# Patient Record
Sex: FEMALE | Race: WHITE | NOT HISPANIC OR LATINO | Employment: OTHER | URBAN - METROPOLITAN AREA
[De-identification: names, ages, dates, MRNs, and addresses within clinical notes are randomized per-mention and may not be internally consistent; named-entity substitution may affect disease eponyms.]

---

## 2017-01-09 ENCOUNTER — OFFICE VISIT (OUTPATIENT)
Dept: FAMILY MEDICINE | Facility: CLINIC | Age: 62
End: 2017-01-09
Payer: COMMERCIAL

## 2017-01-09 VITALS
OXYGEN SATURATION: 98 % | SYSTOLIC BLOOD PRESSURE: 112 MMHG | BODY MASS INDEX: 27.85 KG/M2 | HEIGHT: 64 IN | DIASTOLIC BLOOD PRESSURE: 74 MMHG | HEART RATE: 94 BPM | WEIGHT: 163.13 LBS

## 2017-01-09 DIAGNOSIS — I65.23 CAROTID ARTERY PLAQUE, BILATERAL: ICD-10-CM

## 2017-01-09 DIAGNOSIS — Z23 NEED FOR INFLUENZA VACCINATION: ICD-10-CM

## 2017-01-09 DIAGNOSIS — Z23 NEED FOR SHINGLES VACCINE: ICD-10-CM

## 2017-01-09 DIAGNOSIS — N28.9 RENAL LESION: ICD-10-CM

## 2017-01-09 DIAGNOSIS — Z00.00 ANNUAL PHYSICAL EXAM: Primary | ICD-10-CM

## 2017-01-09 DIAGNOSIS — R07.89 ATYPICAL CHEST PAIN: ICD-10-CM

## 2017-01-09 DIAGNOSIS — E78.5 DYSLIPIDEMIA: ICD-10-CM

## 2017-01-09 DIAGNOSIS — D64.9 ANEMIA, UNSPECIFIED TYPE: ICD-10-CM

## 2017-01-09 DIAGNOSIS — I51.7 LEFT ATRIAL ENLARGEMENT: ICD-10-CM

## 2017-01-09 DIAGNOSIS — F33.0 MILD EPISODE OF RECURRENT MAJOR DEPRESSIVE DISORDER: ICD-10-CM

## 2017-01-09 PROCEDURE — 99214 OFFICE O/P EST MOD 30 MIN: CPT | Mod: 25,S$GLB,, | Performed by: FAMILY MEDICINE

## 2017-01-09 PROCEDURE — 1159F MED LIST DOCD IN RCRD: CPT | Mod: S$GLB,,, | Performed by: FAMILY MEDICINE

## 2017-01-09 PROCEDURE — 99999 PR PBB SHADOW E&M-EST. PATIENT-LVL III: CPT | Mod: PBBFAC,,, | Performed by: FAMILY MEDICINE

## 2017-01-09 PROCEDURE — 90688 IIV4 VACCINE SPLT 0.5 ML IM: CPT | Mod: S$GLB,,, | Performed by: FAMILY MEDICINE

## 2017-01-09 PROCEDURE — 90471 IMMUNIZATION ADMIN: CPT | Mod: S$GLB,,, | Performed by: FAMILY MEDICINE

## 2017-01-09 RX ORDER — CYCLOBENZAPRINE HCL 10 MG
TABLET ORAL
Refills: 0 | COMMUNITY
Start: 2016-12-08 | End: 2018-01-23

## 2017-01-09 RX ORDER — HYDROCODONE BITARTRATE AND ACETAMINOPHEN 10; 325 MG/1; MG/1
TABLET ORAL
Refills: 0 | COMMUNITY
Start: 2016-12-10 | End: 2018-01-23

## 2017-01-09 RX ORDER — PRAVASTATIN SODIUM 10 MG/1
10 TABLET ORAL DAILY
Qty: 90 TABLET | Refills: 3 | Status: SHIPPED | OUTPATIENT
Start: 2017-01-09 | End: 2017-03-15 | Stop reason: SDUPTHER

## 2017-01-09 RX ORDER — FERROUS SULFATE 325(65) MG
325 TABLET ORAL
Qty: 30 TABLET | Refills: 2 | COMMUNITY
Start: 2017-01-09 | End: 2018-01-23

## 2017-01-09 NOTE — MR AVS SNAPSHOT
[facility]   [facility] [place] [address]  Phone: [phone]  Fax: [phone]                  Leticia Brown   2017 2:00 PM   Office Visit    Description:  Female : 1955   Provider:  Timo Pike MD   Department:  [facility]           Reason for Visit     Annual Exam     Sinus Problem           Diagnoses this Visit        Comments    Annual physical exam    -  Primary     Mild episode of recurrent major depressive disorder         Atypical chest pain         Left atrial enlargement         Need for shingles vaccine         Need for influenza vaccination         Anemia, unspecified type         Dyslipidemia         Carotid artery plaque, bilateral         Renal lesion                To Do List           Future Appointments        Provider Department Dept Phone    2017 12:45 PM CARDIOLOGY, STRESS/TILT TABLE/[facility] [phone]    2017 2:45 PM [facility] US1 [facility] [phone]      Goals (5 Years of Data)     None      Follow-Up and Disposition     Return in about 4 months (around 2017), or if symptoms worsen or fail to improve.       These Medications        Disp Refills Start End    zoster vaccine live, PF, (ZOSTAVAX, PF,) 19,400 unit/0.65 mL injection 1 vial 0 2017    Inject 19,400 Units into the skin once. - Subcutaneous    [facility] [address] [place] [address] [address] AT [facility] Ph #: [phone]       pravastatin (PRAVACHOL) 10 MG tablet 90 tablet 3 2017    Take 1 tablet (10 mg total) by mouth once daily. - Oral    Pharmacy: [facility] [address] [place] [address] [address] AT [facility] Ph #: [phone]         PURCHASE these Medications (No prescription required)        Start End    ferrous sulfate 325 mg (65 mg iron) Tab tablet 2017     Sig - Route: Take 1  tablet (325 mg total) by mouth daily with breakfast. - Oral    Class: OTC      [facility] On Call     [facility] On Call Nurse Care Line - 24/7 Assistance  Registered nurses in the [facility] provide clinical advisement, health education, appointment booking, and other advisory services.  Call for this free service at [phone].             Medications           Message regarding Medications     Verify the changes and/or additions to your medication regime listed below are the same as discussed with your clinician today.  If any of these changes or additions are incorrect, please notify your healthcare provider.        START taking these NEW medications        Refills    zoster vaccine live, PF, (ZOSTAVAX, PF,) 19,400 unit/0.65 mL injection 0    Sig: Inject 19,400 Units into the skin once.    Class: Print    Route: Subcutaneous    ferrous sulfate 325 mg (65 mg iron) Tab tablet 2    Sig: Take 1 tablet (325 mg total) by mouth daily with breakfast.    Class: OTC    Route: Oral    pravastatin (PRAVACHOL) 10 MG tablet 3    Sig: Take 1 tablet (10 mg total) by mouth once daily.    Class: Normal    Route: Oral           Verify that the below list of medications is an accurate representation of the medications you are currently taking.  If none reported, the list may be blank. If incorrect, please contact your healthcare provider. Carry this list with you in case of emergency.           Current Medications     acyclovir (ZOVIRAX) 200 MG capsule Take 1 capsule (200 mg total) by mouth once daily.    atorvastatin (LIPITOR) 20 MG tablet Take 1 tablet (20 mg total) by mouth every evening.    carisoprodol (SOMA) 350 MG tablet Take 350 mg by mouth 4 (four) times daily as needed for Muscle spasms.    cyclobenzaprine (FLEXERIL) 10 MG tablet TK 1 T PO TID PRN    estrogen, conjugated,-medroxyprogesterone 0.3-1.5 mg (PREMPRO) 0.3-1.5 mg per tablet Take 1 tablet by mouth once daily.    hydrocodone-acetaminophen 10-325mg  "(NORCO)  mg Tab TK 1 T PO  Q 4-6 H PRN P    venlafaxine (EFFEXOR-XR) 150 MG Cp24 Take 1 capsule (150 mg total) by mouth once daily.    ferrous sulfate 325 mg (65 mg iron) Tab tablet Take 1 tablet (325 mg total) by mouth daily with breakfast.    pravastatin (PRAVACHOL) 10 MG tablet Take 1 tablet (10 mg total) by mouth once daily.    zoster vaccine live, PF, (ZOSTAVAX, PF,) 19,400 unit/0.65 mL injection Inject 19,400 Units into the skin once.           Clinical Reference Information           Vital Signs - Last Recorded  Most recent update: 1/9/2017  2:04 PM by Neema Meléndez MA    BP Pulse Ht Wt SpO2 BMI    112/74 (BP Location: Left arm, Patient Position: Sitting, BP Method: Manual) 94 5' 4" (1.626 m) 74 kg (163 lb 2.3 oz) 98% 28 kg/m2      Blood Pressure          Most Recent Value    BP  112/74      Allergies as of 1/9/2017     Sulfa (Sulfonamide Antibiotics)      Immunizations Administered on Date of Encounter - 1/9/2017     Name Date Dose VIS Date Route    influenza - Quadrivalent  Incomplete 0.5 mL 8/7/2015 Intramuscular      Orders Placed During Today's Visit      Normal Orders This Visit    Influenza - Quadrivalent (3 years & older)     Future Labs/Procedures Expected by Expires    CBC auto differential  1/9/2017 1/9/2018    Comprehensive metabolic panel  1/9/2017 4/9/2017    Lipid panel  1/9/2017 4/9/2017    US Kidney Only  1/9/2017 4/9/2017    Exercise stress echo with color flow  As directed 1/9/2018      "

## 2017-01-09 NOTE — PROGRESS NOTES
Subjective:       Patient ID: Leticia Brown is a 61 y.o. female.    Chief Complaint: Annual Exam and Sinus Problem    HPI Comments: 61 years old female who came to the clinic for her physical examination.  Patient recently evaluated at the emergency room secondary to chest pain.  Cardiovascular workup was normal.  EKG with evidence of left atrial enlargement.  No palpitations orthopnea or PND.  Patient with mild anemia.  Patient also with bilateral carotid plaque associated with elevated cholesterol.  Patient stable with current medicine for depression.    Sinus Problem       Review of Systems   Constitutional: Negative.    HENT: Negative.    Eyes: Negative.    Respiratory: Negative.    Cardiovascular: Positive for chest pain. Negative for palpitations and leg swelling.   Gastrointestinal: Negative.    Genitourinary: Negative.    Musculoskeletal: Negative.    Skin: Negative.    Neurological: Negative.    Psychiatric/Behavioral: Negative.        Objective:      Physical Exam   Constitutional: She is oriented to person, place, and time. She appears well-developed and well-nourished. No distress.   HENT:   Head: Normocephalic and atraumatic.   Right Ear: External ear normal.   Left Ear: External ear normal.   Nose: Nose normal.   Mouth/Throat: Oropharynx is clear and moist. No oropharyngeal exudate.   Eyes: Conjunctivae and EOM are normal. Pupils are equal, round, and reactive to light. Right eye exhibits no discharge. Left eye exhibits no discharge. No scleral icterus.   Neck: Normal range of motion. Neck supple. No JVD present. No tracheal deviation present. No thyromegaly present.   Cardiovascular: Normal rate, regular rhythm, normal heart sounds and intact distal pulses.  Exam reveals no gallop and no friction rub.    No murmur heard.  Pulmonary/Chest: Effort normal and breath sounds normal. No stridor. No respiratory distress. She has no wheezes. She has no rales. She exhibits no tenderness.   Abdominal: Soft.  Bowel sounds are normal. She exhibits no distension and no mass. There is no tenderness. There is no rebound and no guarding.   Musculoskeletal: Normal range of motion. She exhibits no edema or tenderness.   Lymphadenopathy:     She has no cervical adenopathy.   Neurological: She is alert and oriented to person, place, and time. She has normal reflexes. No cranial nerve deficit. She exhibits normal muscle tone. Coordination normal.   Skin: Skin is warm and dry. No rash noted. She is not diaphoretic. No erythema. No pallor.   Psychiatric: She has a normal mood and affect. Her behavior is normal. Judgment and thought content normal.       Assessment:       1. Annual physical exam    2. Mild episode of recurrent major depressive disorder    3. Atypical chest pain    4. Left atrial enlargement    5. Need for shingles vaccine    6. Need for influenza vaccination    7. Anemia, unspecified type    8. Dyslipidemia    9. Carotid artery plaque, bilateral    10. Renal lesion        Plan:         Leticia was seen today for annual exam and sinus problem.    Diagnoses and all orders for this visit:    Annual physical exam  -     Comprehensive metabolic panel; Future  -     Lipid panel; Future  -     CBC auto differential; Future    Mild episode of recurrent major depressive disorder    Atypical chest pain  -     Exercise stress echo with color flow; Future    Left atrial enlargement  -     Exercise stress echo with color flow; Future    Need for shingles vaccine  -     zoster vaccine live, PF, (ZOSTAVAX, PF,) 19,400 unit/0.65 mL injection; Inject 19,400 Units into the skin once.    Need for influenza vaccination  -     Influenza - Quadrivalent (3 years & older)    Anemia, unspecified type  -     ferrous sulfate 325 mg (65 mg iron) Tab tablet; Take 1 tablet (325 mg total) by mouth daily with breakfast.    Dyslipidemia  -     pravastatin (PRAVACHOL) 10 MG tablet; Take 1 tablet (10 mg total) by mouth once daily.    Carotid artery  plaque, bilateral  -     pravastatin (PRAVACHOL) 10 MG tablet; Take 1 tablet (10 mg total) by mouth once daily.    Renal lesion  -     US Kidney Only; Future

## 2017-01-12 ENCOUNTER — HOSPITAL ENCOUNTER (OUTPATIENT)
Dept: CARDIOLOGY | Facility: HOSPITAL | Age: 62
Discharge: HOME | End: 2017-01-12
Attending: FAMILY MEDICINE
Payer: COMMERCIAL

## 2017-01-12 DIAGNOSIS — R07.89 ATYPICAL CHEST PAIN: ICD-10-CM

## 2017-01-12 DIAGNOSIS — I51.7 LEFT ATRIAL ENLARGEMENT: ICD-10-CM

## 2017-01-12 LAB
DIASTOLIC DYSFUNCTION: NO
ESTIMATED PA SYSTOLIC PRESSURE: 13.76
MITRAL VALVE MOBILITY: NORMAL
RETIRED EF AND QEF - SEE NOTES: 60 (ref 55–65)
TRICUSPID VALVE REGURGITATION: NORMAL

## 2017-01-12 PROCEDURE — 93325 DOPPLER ECHO COLOR FLOW MAPG: CPT

## 2017-01-12 PROCEDURE — 93320 DOPPLER ECHO COMPLETE: CPT | Mod: 26,,, | Performed by: INTERNAL MEDICINE

## 2017-01-12 PROCEDURE — 93351 STRESS TTE COMPLETE: CPT | Mod: 26,,, | Performed by: INTERNAL MEDICINE

## 2017-01-12 PROCEDURE — 93325 DOPPLER ECHO COLOR FLOW MAPG: CPT | Mod: 26,,, | Performed by: INTERNAL MEDICINE

## 2017-01-31 ENCOUNTER — HOSPITAL ENCOUNTER (OUTPATIENT)
Dept: RADIOLOGY | Facility: HOSPITAL | Age: 62
Discharge: HOME | End: 2017-01-31
Attending: FAMILY MEDICINE
Payer: COMMERCIAL

## 2017-01-31 DIAGNOSIS — N28.9 RENAL LESION: ICD-10-CM

## 2017-01-31 PROCEDURE — 76770 US EXAM ABDO BACK WALL COMP: CPT | Mod: 26,,, | Performed by: RADIOLOGY

## 2017-01-31 PROCEDURE — 76770 US EXAM ABDO BACK WALL COMP: CPT | Mod: TC

## 2017-03-15 DIAGNOSIS — I65.23 CAROTID ARTERY PLAQUE, BILATERAL: ICD-10-CM

## 2017-03-15 DIAGNOSIS — F33.1 MODERATE EPISODE OF RECURRENT MAJOR DEPRESSIVE DISORDER: ICD-10-CM

## 2017-03-15 DIAGNOSIS — F41.9 ANXIETY: ICD-10-CM

## 2017-03-15 DIAGNOSIS — E78.5 DYSLIPIDEMIA: ICD-10-CM

## 2017-03-16 RX ORDER — VENLAFAXINE HYDROCHLORIDE 150 MG/1
150 CAPSULE, EXTENDED RELEASE ORAL DAILY
Qty: 90 CAPSULE | Refills: 3 | Status: SHIPPED | OUTPATIENT
Start: 2017-03-16 | End: 2018-02-28 | Stop reason: SDUPTHER

## 2017-03-16 RX ORDER — PRAVASTATIN SODIUM 10 MG/1
10 TABLET ORAL DAILY
Qty: 90 TABLET | Refills: 3 | Status: SHIPPED | OUTPATIENT
Start: 2017-03-16 | End: 2019-05-29

## 2017-12-06 ENCOUNTER — PATIENT MESSAGE (OUTPATIENT)
Dept: FAMILY MEDICINE | Facility: CLINIC | Age: 62
End: 2017-12-06

## 2018-01-23 ENCOUNTER — OFFICE VISIT (OUTPATIENT)
Dept: FAMILY MEDICINE | Facility: CLINIC | Age: 63
End: 2018-01-23
Payer: COMMERCIAL

## 2018-01-23 VITALS
BODY MASS INDEX: 27.48 KG/M2 | HEIGHT: 64 IN | SYSTOLIC BLOOD PRESSURE: 110 MMHG | DIASTOLIC BLOOD PRESSURE: 60 MMHG | HEART RATE: 86 BPM | WEIGHT: 160.94 LBS

## 2018-01-23 DIAGNOSIS — E78.5 DYSLIPIDEMIA: ICD-10-CM

## 2018-01-23 DIAGNOSIS — F33.0 MILD EPISODE OF RECURRENT MAJOR DEPRESSIVE DISORDER: ICD-10-CM

## 2018-01-23 DIAGNOSIS — G95.0 SYRINGOMYELIA: ICD-10-CM

## 2018-01-23 DIAGNOSIS — Z12.4 PAP SMEAR FOR CERVICAL CANCER SCREENING: ICD-10-CM

## 2018-01-23 DIAGNOSIS — Z98.890 HISTORY OF BACK SURGERY: ICD-10-CM

## 2018-01-23 DIAGNOSIS — Z00.00 ANNUAL PHYSICAL EXAM: Primary | ICD-10-CM

## 2018-01-23 DIAGNOSIS — Z12.31 ENCOUNTER FOR SCREENING MAMMOGRAM FOR MALIGNANT NEOPLASM OF BREAST: ICD-10-CM

## 2018-01-23 DIAGNOSIS — Z98.890 HISTORY OF CERVICAL SPINAL SURGERY: ICD-10-CM

## 2018-01-23 PROCEDURE — 99999 PR PBB SHADOW E&M-EST. PATIENT-LVL V: CPT | Mod: PBBFAC,,, | Performed by: FAMILY MEDICINE

## 2018-01-23 PROCEDURE — 88175 CYTOPATH C/V AUTO FLUID REDO: CPT

## 2018-01-23 PROCEDURE — 99396 PREV VISIT EST AGE 40-64: CPT | Mod: S$GLB,,, | Performed by: FAMILY MEDICINE

## 2018-01-23 NOTE — PATIENT INSTRUCTIONS
Aerobic Exercise for a Healthy Heart  Exercise is a lot more than an energy booster and a stress reliever. It also strengthens your heart muscle, lowers your blood pressure and cholesterol, and burns calories. It can also improve your resting muscle tone, and your mood.     Remember, some activity is better than none.    Choose an aerobic activity  Choose an activity that makes your heart and lungs work harder than they do when you rest or walk normally. This aerobic exercise can improve the way your heart and other muscles use oxygen. Make it fun by exercising with a friend and choosing an activity you enjoy. Here are some ideas:  · Walking  · Swimming  · Bicycling  · Stair climbing  · Dancing  · Jogging  · Gardening  Exercise regularly  If you havent been exercising regularly,  get your doctors OK first. Then start slowly.  Here are some tips:  · Begin exercising 3 times a week for 5 to 10 minutes at a time.  · When you feel comfortable, add a few minutes each session.  · Slowly build up to exercising 3 to 4 times each week. Each session should last for 40 minutes, on average, and involve moderate- to vigorous-intensity physical activity.  · If you have been given nitroglycerin, be sure to carry it when you exercise.  · If you get chest pain (angina) when youre exercising, stop what youre doing, take your nitroglycerin, and call your doctor.  Date Last Reviewed: 6/2/2016  © 8881-0007 [employer]. [address] [place] [address], [place] [address]. All rights reserved. This information is not intended as a substitute for professional medical care. Always follow your healthcare professional's instructions.

## 2018-01-23 NOTE — PROGRESS NOTES
Subjective:       Patient ID: Leticia Brown is a 62 y.o. female.    Chief Complaint: Annual Exam    62 years old female came to the clinic for her physical examination.  Patient depression currently stable on Effexor.  No recent flareups.  Patient with history of back and neck surgery associated with syringomyelia  requesting follow-up with neurosurgery.  Patient due for her Pap smear.      Review of Systems   Constitutional: Negative.  Negative for activity change and unexpected weight change.   HENT: Negative.  Negative for hearing loss, rhinorrhea and trouble swallowing.    Eyes: Negative.  Negative for discharge and visual disturbance.   Respiratory: Negative.  Negative for chest tightness and wheezing.    Cardiovascular: Negative.  Negative for chest pain and palpitations.   Gastrointestinal: Negative.  Negative for blood in stool, constipation, diarrhea and vomiting.   Endocrine: Negative for polydipsia and polyuria.   Genitourinary: Negative.  Negative for difficulty urinating, dysuria, hematuria and menstrual problem.   Musculoskeletal: Positive for back pain. Negative for arthralgias, joint swelling and neck pain.   Skin: Negative.    Neurological: Negative.  Negative for weakness and headaches.   Psychiatric/Behavioral: Negative.  Negative for confusion and dysphoric mood.       Objective:      Physical Exam   Constitutional: She is oriented to person, place, and time. She appears well-developed and well-nourished. No distress.   HENT:   Head: Normocephalic and atraumatic.   Right Ear: External ear normal.   Left Ear: External ear normal.   Nose: Nose normal.   Mouth/Throat: Oropharynx is clear and moist. No oropharyngeal exudate.   Eyes: Conjunctivae and EOM are normal. Pupils are equal, round, and reactive to light. Right eye exhibits no discharge. Left eye exhibits no discharge. No scleral icterus.   Neck: Normal range of motion. Neck supple. No JVD present. No tracheal deviation present. No  thyromegaly present.   Cardiovascular: Normal rate, regular rhythm, normal heart sounds and intact distal pulses.  Exam reveals no gallop and no friction rub.    No murmur heard.  Pulmonary/Chest: Effort normal and breath sounds normal. No stridor. No respiratory distress. She has no wheezes. She has no rales. She exhibits no tenderness.   Abdominal: Soft. Bowel sounds are normal. She exhibits no distension and no mass. There is no tenderness. There is no rebound and no guarding.   Genitourinary: Cervix exhibits no motion tenderness, no discharge and no friability. No erythema, tenderness or bleeding in the vagina. No foreign body in the vagina. No signs of injury around the vagina. No vaginal discharge found.   Musculoskeletal: Normal range of motion. She exhibits no edema or tenderness.   Lymphadenopathy:     She has no cervical adenopathy.   Neurological: She is alert and oriented to person, place, and time. She has normal reflexes. No cranial nerve deficit. She exhibits normal muscle tone. Coordination normal.   Skin: Skin is warm and dry. No rash noted. She is not diaphoretic. No erythema. No pallor.   Psychiatric: She has a normal mood and affect. Her behavior is normal. Judgment and thought content normal.       Assessment:       1. Annual physical exam    2. Dyslipidemia    3. Mild episode of recurrent major depressive disorder    4. Pap smear for cervical cancer screening    5. Encounter for screening mammogram for malignant neoplasm of breast    6. Syringomyelia    7. History of back surgery    8. History of cervical spinal surgery        Plan:         Leticia was seen today for annual exam.    Diagnoses and all orders for this visit:    Annual physical exam  -     Comprehensive metabolic panel; Future  -     Lipid panel; Future  -     Urinalysis; Future  -     CBC auto differential; Future    Dyslipidemia  -     Comprehensive metabolic panel; Future  -     Lipid panel; Future  -     TSH; Future    Mild  episode of recurrent major depressive disorder  -     Comprehensive metabolic panel; Future  -     TSH; Future  -     CBC auto differential; Future    Pap smear for cervical cancer screening  -     Liquid-based pap smear, screening    Encounter for screening mammogram for malignant neoplasm of breast  -     Mammo Digital Screening Bilat with CAD; Future    Syringomyelia  -     MRI Thoracic Spine Without Contrast; Future  -     Ambulatory referral to Neurosurgery    History of back surgery  -     MRI Lumbar Spine Without Contrast; Future  -     Ambulatory referral to Neurosurgery    History of cervical spinal surgery  -     MRI Cervical Spine Without Contrast; Future  -     Ambulatory referral to Neurosurgery

## 2018-01-24 ENCOUNTER — HOSPITAL ENCOUNTER (OUTPATIENT)
Dept: RADIOLOGY | Facility: HOSPITAL | Age: 63
Discharge: HOME | End: 2018-01-24
Attending: FAMILY MEDICINE
Payer: COMMERCIAL

## 2018-01-24 ENCOUNTER — LAB VISIT (OUTPATIENT)
Dept: LAB | Facility: HOSPITAL | Age: 63
End: 2018-01-24
Attending: FAMILY MEDICINE
Payer: COMMERCIAL

## 2018-01-24 DIAGNOSIS — Z12.31 ENCOUNTER FOR SCREENING MAMMOGRAM FOR MALIGNANT NEOPLASM OF BREAST: ICD-10-CM

## 2018-01-24 DIAGNOSIS — E78.5 DYSLIPIDEMIA: ICD-10-CM

## 2018-01-24 DIAGNOSIS — F33.0 MILD EPISODE OF RECURRENT MAJOR DEPRESSIVE DISORDER: ICD-10-CM

## 2018-01-24 DIAGNOSIS — Z00.00 ANNUAL PHYSICAL EXAM: ICD-10-CM

## 2018-01-24 LAB
ALBUMIN SERPL BCP-MCNC: 3.6 G/DL
ALP SERPL-CCNC: 107 U/L
ALT SERPL W/O P-5'-P-CCNC: 21 U/L
ANION GAP SERPL CALC-SCNC: 6 MMOL/L
AST SERPL-CCNC: 23 U/L
BASOPHILS # BLD AUTO: 0.04 K/UL
BASOPHILS NFR BLD: 0.9 %
BILIRUB SERPL-MCNC: 0.5 MG/DL
BUN SERPL-MCNC: 15 MG/DL
CALCIUM SERPL-MCNC: 9.4 MG/DL
CHLORIDE SERPL-SCNC: 103 MMOL/L
CHOLEST SERPL-MCNC: 240 MG/DL
CHOLEST/HDLC SERPL: 2.6 {RATIO}
CO2 SERPL-SCNC: 33 MMOL/L
CREAT SERPL-MCNC: 0.8 MG/DL
DIFFERENTIAL METHOD: ABNORMAL
EOSINOPHIL # BLD AUTO: 0.1 K/UL
EOSINOPHIL NFR BLD: 1.9 %
ERYTHROCYTE [DISTWIDTH] IN BLOOD BY AUTOMATED COUNT: 13.4 %
EST. GFR  (AFRICAN AMERICAN): >60 ML/MIN/1.73 M^2
EST. GFR  (NON AFRICAN AMERICAN): >60 ML/MIN/1.73 M^2
GLUCOSE SERPL-MCNC: 91 MG/DL
HCT VFR BLD AUTO: 39.2 %
HDLC SERPL-MCNC: 92 MG/DL
HDLC SERPL: 38.3 %
HGB BLD-MCNC: 12.5 G/DL
IMM GRANULOCYTES # BLD AUTO: 0 K/UL
IMM GRANULOCYTES NFR BLD AUTO: 0 %
LDLC SERPL CALC-MCNC: 135.8 MG/DL
LYMPHOCYTES # BLD AUTO: 1.6 K/UL
LYMPHOCYTES NFR BLD: 34.6 %
MCH RBC QN AUTO: 28.5 PG
MCHC RBC AUTO-ENTMCNC: 31.9 G/DL
MCV RBC AUTO: 89 FL
MONOCYTES # BLD AUTO: 0.4 K/UL
MONOCYTES NFR BLD: 9.4 %
NEUTROPHILS # BLD AUTO: 2.5 K/UL
NEUTROPHILS NFR BLD: 53.2 %
NONHDLC SERPL-MCNC: 148 MG/DL
NRBC BLD-RTO: 0 /100 WBC
PLATELET # BLD AUTO: 297 K/UL
PMV BLD AUTO: 11.4 FL
POTASSIUM SERPL-SCNC: 3.8 MMOL/L
PROT SERPL-MCNC: 7.1 G/DL
RBC # BLD AUTO: 4.39 M/UL
SODIUM SERPL-SCNC: 142 MMOL/L
TRIGL SERPL-MCNC: 61 MG/DL
TSH SERPL DL<=0.005 MIU/L-ACNC: 2.81 UIU/ML
WBC # BLD AUTO: 4.68 K/UL

## 2018-01-24 PROCEDURE — 80061 LIPID PANEL: CPT

## 2018-01-24 PROCEDURE — 84443 ASSAY THYROID STIM HORMONE: CPT

## 2018-01-24 PROCEDURE — 85025 COMPLETE CBC W/AUTO DIFF WBC: CPT

## 2018-01-24 PROCEDURE — 80053 COMPREHEN METABOLIC PANEL: CPT

## 2018-01-24 PROCEDURE — 77067 SCR MAMMO BI INCL CAD: CPT | Mod: 26,,, | Performed by: RADIOLOGY

## 2018-01-24 PROCEDURE — 36415 COLL VENOUS BLD VENIPUNCTURE: CPT | Mod: PO

## 2018-01-24 PROCEDURE — 77067 SCR MAMMO BI INCL CAD: CPT | Mod: TC

## 2018-01-24 PROCEDURE — 77063 BREAST TOMOSYNTHESIS BI: CPT | Mod: 26,,, | Performed by: RADIOLOGY

## 2018-02-15 ENCOUNTER — TELEPHONE (OUTPATIENT)
Dept: FAMILY MEDICINE | Facility: CLINIC | Age: 63
End: 2018-02-15

## 2018-02-15 DIAGNOSIS — G95.0 SYRINGOMYELIA: Primary | ICD-10-CM

## 2018-02-28 ENCOUNTER — HOSPITAL ENCOUNTER (OUTPATIENT)
Dept: RADIOLOGY | Facility: HOSPITAL | Age: 63
Discharge: HOME | End: 2018-02-28
Attending: FAMILY MEDICINE
Payer: COMMERCIAL

## 2018-02-28 ENCOUNTER — PATIENT MESSAGE (OUTPATIENT)
Dept: FAMILY MEDICINE | Facility: CLINIC | Age: 63
End: 2018-02-28

## 2018-02-28 DIAGNOSIS — B00.9 HERPES: ICD-10-CM

## 2018-02-28 DIAGNOSIS — F33.1 MODERATE EPISODE OF RECURRENT MAJOR DEPRESSIVE DISORDER: ICD-10-CM

## 2018-02-28 DIAGNOSIS — F41.9 ANXIETY: ICD-10-CM

## 2018-02-28 DIAGNOSIS — G95.0 SYRINGOMYELIA: ICD-10-CM

## 2018-02-28 PROCEDURE — 72131 CT LUMBAR SPINE W/O DYE: CPT | Mod: 26,,, | Performed by: RADIOLOGY

## 2018-02-28 PROCEDURE — 72131 CT LUMBAR SPINE W/O DYE: CPT | Mod: TC

## 2018-02-28 RX ORDER — ACYCLOVIR 200 MG/1
200 CAPSULE ORAL DAILY
Qty: 90 CAPSULE | Refills: 1 | Status: SHIPPED | OUTPATIENT
Start: 2018-02-28 | End: 2019-04-01 | Stop reason: SDUPTHER

## 2018-02-28 RX ORDER — VENLAFAXINE HYDROCHLORIDE 150 MG/1
150 CAPSULE, EXTENDED RELEASE ORAL DAILY
Qty: 90 CAPSULE | Refills: 3 | Status: SHIPPED | OUTPATIENT
Start: 2018-02-28 | End: 2019-04-01 | Stop reason: SDUPTHER

## 2018-03-05 ENCOUNTER — TELEPHONE (OUTPATIENT)
Dept: FAMILY MEDICINE | Facility: CLINIC | Age: 63
End: 2018-03-05

## 2018-03-05 DIAGNOSIS — G95.0 SYRINGOMYELIA: Primary | ICD-10-CM

## 2018-03-06 ENCOUNTER — TELEPHONE (OUTPATIENT)
Dept: FAMILY MEDICINE | Facility: CLINIC | Age: 63
End: 2018-03-06

## 2018-03-06 NOTE — TELEPHONE ENCOUNTER
Dr burgess spoke to physician at [facility], ct of t-spine not covered, they want pt to see neurosurgeon first

## 2018-03-06 NOTE — TELEPHONE ENCOUNTER
The Pt case is pending with [employer].  The insurance ([facility]) states that additional clinical information is needed in order to complete the pt case.The physician or someone in the physicians staff are needed to call [phone] to discuss this case with a clinical nurse reviewer. The case is due to close soon if no one from the physicians staff calls to give the additional clinical information. I sent an in basket to the physician and to the physicians staff.     -Luzma Ramírez

## 2018-03-09 ENCOUNTER — TELEPHONE (OUTPATIENT)
Dept: NEUROSURGERY | Facility: CLINIC | Age: 63
End: 2018-03-09

## 2019-04-01 DIAGNOSIS — F33.1 MODERATE EPISODE OF RECURRENT MAJOR DEPRESSIVE DISORDER: ICD-10-CM

## 2019-04-01 DIAGNOSIS — F41.9 ANXIETY: ICD-10-CM

## 2019-04-01 DIAGNOSIS — B00.9 HERPES: ICD-10-CM

## 2019-04-01 RX ORDER — ACYCLOVIR 200 MG/1
200 CAPSULE ORAL DAILY
Qty: 90 CAPSULE | Refills: 3 | Status: SHIPPED | OUTPATIENT
Start: 2019-04-01 | End: 2021-09-08 | Stop reason: SDUPTHER

## 2019-04-01 RX ORDER — VENLAFAXINE HYDROCHLORIDE 150 MG/1
150 CAPSULE, EXTENDED RELEASE ORAL DAILY
Qty: 90 CAPSULE | Refills: 3 | Status: SHIPPED | OUTPATIENT
Start: 2019-04-01 | End: 2020-03-17 | Stop reason: SDUPTHER

## 2019-04-01 NOTE — TELEPHONE ENCOUNTER
----- Message from Gricelda Mcguire sent at 4/1/2019  9:31 AM CDT -----  Contact: [phone]/self  Type:  RX Refill Request    Who Called: Leticia Brown  Refill or New Rx:refill  RX Name and Strength: venlafaxine (EFFEXOR-XR) 150 MG Cp24  How is the patient currently taking it? (ex. 1XDay):Take 1 capsule (150 mg total) by mouth once daily  Is this a 30 day or 90 day RX:90 day  Preferred Pharmacy with phone number: [facility] [id number] - [place], [place] [address] AT [facility] [address]  Local or Mail Order:Local  Ordering Provider:Dr. Melgar   Would the patient rather a call back or a response via [facility]? callback  Best Call Back Number:[phone]  Additional Information: [place] out [place] pt would like medication by tomorrow.

## 2019-04-01 NOTE — TELEPHONE ENCOUNTER
----- Message from Danny Cortés sent at 4/1/2019 10:52 AM CDT -----  Contact: [phone] or annia [phone]   Patient is requesting orders for lab work. Please call.    Type:  RX Refill Request    Who Called: Pt  Refill or New Rx:Refill  RX Name and Strength:acyclovir (ZOVIRAX) 200 MG capsule  How is the patient currently taking it? (ex. 1XDay):Take 1 capsule (200 mg total) by mouth once daily  Is this a 30 day or 90 day RX:N/A  Preferred Pharmacy with phone number:[facility] [id number] [place], [place] - [address] AT [facility] [address]  Local or Mail Order:Local  Ordering Provider:Dr. Melgar  Would the patient rather a call back or a response via [facility]? Call back  Best Call Back Number: [phone] or annia [phone]   Additional Information: N/A

## 2019-04-01 NOTE — TELEPHONE ENCOUNTER
----- Message from Gricelda Mcguire sent at 4/1/2019  9:31 AM CDT -----  Contact: [phone]/self  Type:  RX Refill Request    Who Called: Leticia Brown  Refill or New Rx:refill  RX Name and Strength: venlafaxine (EFFEXOR-XR) 150 MG Cp24  How is the patient currently taking it? (ex. 1XDay):Take 1 capsule (150 mg total) by mouth once daily  Is this a 30 day or 90 day RX:90 day  Preferred Pharmacy with phone number: [facility] [id number] - [place], [place] [address] [facility] [address]  Local or Mail Order:Local  Ordering Provider:Dr. Melgar   Would the patient rather a call back or a response via [facility]? callback  Best Call Back Number:[phone]  Additional Information: Patient is going out of the country pt would like medication by tomorrow.

## 2019-05-23 ENCOUNTER — TELEPHONE (OUTPATIENT)
Dept: FAMILY MEDICINE | Facility: CLINIC | Age: 64
End: 2019-05-23

## 2019-05-23 DIAGNOSIS — E78.5 DYSLIPIDEMIA: Primary | ICD-10-CM

## 2019-05-23 NOTE — TELEPHONE ENCOUNTER
----- Message from Ella Carrillo sent at 5/23/2019  2:04 PM CDT -----  Contact: [phone]/self   Patient requesting lab orders be put in the system.  Please advise

## 2019-05-28 ENCOUNTER — LAB VISIT (OUTPATIENT)
Dept: LAB | Facility: HOSPITAL | Age: 64
End: 2019-05-28
Attending: FAMILY MEDICINE
Payer: COMMERCIAL

## 2019-05-28 DIAGNOSIS — E78.5 DYSLIPIDEMIA: ICD-10-CM

## 2019-05-28 LAB
ALBUMIN SERPL BCP-MCNC: 3.7 G/DL (ref 3.5–5.2)
ALP SERPL-CCNC: 102 U/L (ref 55–135)
ALT SERPL W/O P-5'-P-CCNC: 18 U/L (ref 10–44)
ANION GAP SERPL CALC-SCNC: 7 MMOL/L (ref 8–16)
AST SERPL-CCNC: 21 U/L (ref 10–40)
BASOPHILS # BLD AUTO: 0.03 K/UL (ref 0–0.2)
BASOPHILS NFR BLD: 0.6 % (ref 0–1.9)
BILIRUB SERPL-MCNC: 0.4 MG/DL (ref 0.1–1)
BUN SERPL-MCNC: 18 MG/DL (ref 8–23)
CALCIUM SERPL-MCNC: 9 MG/DL (ref 8.7–10.5)
CHLORIDE SERPL-SCNC: 106 MMOL/L (ref 95–110)
CHOLEST SERPL-MCNC: 216 MG/DL (ref 120–199)
CHOLEST/HDLC SERPL: 3 {RATIO} (ref 2–5)
CO2 SERPL-SCNC: 30 MMOL/L (ref 23–29)
CREAT SERPL-MCNC: 0.8 MG/DL (ref 0.5–1.4)
DIFFERENTIAL METHOD: NORMAL
EOSINOPHIL # BLD AUTO: 0.1 K/UL (ref 0–0.5)
EOSINOPHIL NFR BLD: 1.9 % (ref 0–8)
ERYTHROCYTE [DISTWIDTH] IN BLOOD BY AUTOMATED COUNT: 13.5 % (ref 11.5–14.5)
EST. GFR  (AFRICAN AMERICAN): >60 ML/MIN/1.73 M^2
EST. GFR  (NON AFRICAN AMERICAN): >60 ML/MIN/1.73 M^2
GLUCOSE SERPL-MCNC: 93 MG/DL (ref 70–110)
HCT VFR BLD AUTO: 40.1 % (ref 37–48.5)
HDLC SERPL-MCNC: 73 MG/DL (ref 40–75)
HDLC SERPL: 33.8 % (ref 20–50)
HGB BLD-MCNC: 12.9 G/DL (ref 12–16)
IMM GRANULOCYTES # BLD AUTO: 0.01 K/UL (ref 0–0.04)
IMM GRANULOCYTES NFR BLD AUTO: 0.2 % (ref 0–0.5)
LDLC SERPL CALC-MCNC: 124.8 MG/DL (ref 63–159)
LYMPHOCYTES # BLD AUTO: 1.4 K/UL (ref 1–4.8)
LYMPHOCYTES NFR BLD: 26 % (ref 18–48)
MCH RBC QN AUTO: 29 PG (ref 27–31)
MCHC RBC AUTO-ENTMCNC: 32.2 G/DL (ref 32–36)
MCV RBC AUTO: 90 FL (ref 82–98)
MONOCYTES # BLD AUTO: 0.6 K/UL (ref 0.3–1)
MONOCYTES NFR BLD: 11.3 % (ref 4–15)
NEUTROPHILS # BLD AUTO: 3.2 K/UL (ref 1.8–7.7)
NEUTROPHILS NFR BLD: 60 % (ref 38–73)
NONHDLC SERPL-MCNC: 143 MG/DL
NRBC BLD-RTO: 0 /100 WBC
PLATELET # BLD AUTO: 290 K/UL (ref 150–350)
PMV BLD AUTO: 11.6 FL (ref 9.2–12.9)
POTASSIUM SERPL-SCNC: 3.7 MMOL/L (ref 3.5–5.1)
PROT SERPL-MCNC: 7.1 G/DL (ref 6–8.4)
RBC # BLD AUTO: 4.45 M/UL (ref 4–5.4)
SODIUM SERPL-SCNC: 143 MMOL/L (ref 136–145)
TRIGL SERPL-MCNC: 91 MG/DL (ref 30–150)
TSH SERPL DL<=0.005 MIU/L-ACNC: 2.12 UIU/ML (ref 0.4–4)
WBC # BLD AUTO: 5.31 K/UL (ref 3.9–12.7)

## 2019-05-28 PROCEDURE — 80053 COMPREHEN METABOLIC PANEL: CPT

## 2019-05-28 PROCEDURE — 85025 COMPLETE CBC W/AUTO DIFF WBC: CPT

## 2019-05-28 PROCEDURE — 36415 COLL VENOUS BLD VENIPUNCTURE: CPT | Mod: PO

## 2019-05-28 PROCEDURE — 84443 ASSAY THYROID STIM HORMONE: CPT

## 2019-05-28 PROCEDURE — 80061 LIPID PANEL: CPT

## 2019-05-29 ENCOUNTER — OFFICE VISIT (OUTPATIENT)
Dept: FAMILY MEDICINE | Facility: CLINIC | Age: 64
End: 2019-05-29
Payer: COMMERCIAL

## 2019-05-29 ENCOUNTER — HOSPITAL ENCOUNTER (OUTPATIENT)
Dept: RADIOLOGY | Facility: HOSPITAL | Age: 64
Discharge: HOME | End: 2019-05-29
Attending: FAMILY MEDICINE
Payer: COMMERCIAL

## 2019-05-29 VITALS
HEIGHT: 64 IN | HEART RATE: 82 BPM | OXYGEN SATURATION: 98 % | DIASTOLIC BLOOD PRESSURE: 60 MMHG | SYSTOLIC BLOOD PRESSURE: 120 MMHG | WEIGHT: 165.38 LBS | BODY MASS INDEX: 28.24 KG/M2

## 2019-05-29 DIAGNOSIS — M25.472 ANKLE SWELLING, LEFT: ICD-10-CM

## 2019-05-29 DIAGNOSIS — M95.0 NASAL DEFORMITY: ICD-10-CM

## 2019-05-29 DIAGNOSIS — Z00.00 ANNUAL PHYSICAL EXAM: Primary | ICD-10-CM

## 2019-05-29 DIAGNOSIS — L98.9 SKIN LESION: ICD-10-CM

## 2019-05-29 DIAGNOSIS — E78.5 DYSLIPIDEMIA: ICD-10-CM

## 2019-05-29 DIAGNOSIS — R13.12 OROPHARYNGEAL DYSPHAGIA: ICD-10-CM

## 2019-05-29 DIAGNOSIS — R06.83 SNORING: ICD-10-CM

## 2019-05-29 DIAGNOSIS — Z12.11 SCREEN FOR COLON CANCER: ICD-10-CM

## 2019-05-29 DIAGNOSIS — N95.1 MENOPAUSE SYNDROME: ICD-10-CM

## 2019-05-29 PROCEDURE — 73610 X-RAY EXAM OF ANKLE: CPT | Mod: 26,LT,, | Performed by: INTERNAL MEDICINE

## 2019-05-29 PROCEDURE — 99214 OFFICE O/P EST MOD 30 MIN: CPT | Mod: S$GLB,,, | Performed by: FAMILY MEDICINE

## 2019-05-29 PROCEDURE — 99999 PR PBB SHADOW E&M-EST. PATIENT-LVL V: CPT | Mod: PBBFAC,,, | Performed by: FAMILY MEDICINE

## 2019-05-29 PROCEDURE — 99999 PR PBB SHADOW E&M-EST. PATIENT-LVL V: ICD-10-PCS | Mod: PBBFAC,,, | Performed by: FAMILY MEDICINE

## 2019-05-29 PROCEDURE — 73610 XR ANKLE COMPLETE 3 VIEW LEFT: ICD-10-PCS | Mod: 26,LT,, | Performed by: INTERNAL MEDICINE

## 2019-05-29 PROCEDURE — 99214 PR OFFICE/OUTPT VISIT, EST, LEVL IV, 30-39 MIN: ICD-10-PCS | Mod: S$GLB,,, | Performed by: FAMILY MEDICINE

## 2019-05-29 PROCEDURE — 73610 X-RAY EXAM OF ANKLE: CPT | Mod: TC,FY,PO,LT

## 2019-05-29 RX ORDER — ETODOLAC 400 MG/1
400 TABLET, EXTENDED RELEASE ORAL DAILY
Qty: 30 TABLET | Refills: 0 | Status: SHIPPED | OUTPATIENT
Start: 2019-05-29 | End: 2019-06-28

## 2019-05-29 NOTE — PROGRESS NOTES
Subjective:       Patient ID: Leticia Brown is a 63 y.o. female.    Chief Complaint: Annual Exam    63 years old female came to the clinic for her physical examination.  Patient with slightly elevated cholesterol but better than before.  Patient did not want statin therapy.  Patient with right skin lesion requesting dermatology evaluation.  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  Patient request ENT follow-up.  Patient also reports episodic problems with swallowing.  Patient reports episodic hot flashes.  She is currently taking SSRI.  Patient did not want hormone therapy right now.  Patient with left ankle swelling for the last 5 months.  Patient's with pain sometimes.  The pain is 3/10 in intensity on and off aggravated with activity and better with rest.  It patient was not able to do too much physical activity because of the ankle swelling and pain.    Review of Systems   Constitutional: Negative.  Negative for activity change and unexpected weight change.   HENT: Positive for trouble swallowing. Negative for hearing loss and rhinorrhea.    Eyes: Negative.  Negative for discharge and visual disturbance.   Respiratory: Negative.  Negative for chest tightness and wheezing.    Cardiovascular: Negative.  Negative for chest pain and palpitations.   Gastrointestinal: Negative.  Negative for blood in stool, constipation, diarrhea and vomiting.   Endocrine: Negative for polydipsia and polyuria.   Genitourinary: Negative.  Negative for difficulty urinating, dysuria, hematuria and menstrual problem.   Musculoskeletal: Positive for arthralgias and joint swelling. Negative for neck pain.   Skin: Negative.    Neurological: Negative.  Negative for weakness and headaches.   Psychiatric/Behavioral: Negative.  Negative for confusion and dysphoric mood.       Objective:      Physical Exam   Constitutional: She is oriented to person, place, and time. She appears well-developed and  well-nourished. No distress.   HENT:   Head: Normocephalic and atraumatic.   Right Ear: External ear normal.   Left Ear: External ear normal.   Nose: Nose normal.   Mouth/Throat: Oropharynx is clear and moist. No oropharyngeal exudate.   Eyes: Pupils are equal, round, and reactive to light. Conjunctivae and EOM are normal. Right eye exhibits no discharge. Left eye exhibits no discharge. No scleral icterus.   Neck: Normal range of motion. Neck supple. No JVD present. No tracheal deviation present. No thyromegaly present.   Cardiovascular: Normal rate, regular rhythm, normal heart sounds and intact distal pulses. Exam reveals no gallop and no friction rub.   No murmur heard.  Pulmonary/Chest: Effort normal and breath sounds normal. No stridor. No respiratory distress. She has no wheezes. She has no rales. She exhibits no tenderness.   Abdominal: Soft. Bowel sounds are normal. She exhibits no distension and no mass. There is no tenderness. There is no rebound and no guarding.   Musculoskeletal: Normal range of motion. She exhibits no edema.        Left ankle: She exhibits swelling. Tenderness. Lateral malleolus tenderness found. No medial malleolus tenderness found.        Feet:    Lymphadenopathy:     She has no cervical adenopathy.   Neurological: She is alert and oriented to person, place, and time. She has normal reflexes. No cranial nerve deficit. She exhibits normal muscle tone. Coordination normal.   Skin: Skin is warm and dry. No rash noted. She is not diaphoretic. No erythema. No pallor.   Psychiatric: She has a normal mood and affect. Her behavior is normal. Judgment and thought content normal.       Assessment:       1. Annual physical exam    2. Ankle swelling, left    3. Snoring    4. Nasal deformity    5. Menopause syndrome    6. Skin lesion    7. Dyslipidemia    8. Oropharyngeal dysphagia    9. Screen for colon cancer        Plan:         Leticia was seen today for annual exam.    Diagnoses and all orders  for this visit:    Annual physical exam    Ankle swelling, left  -     X-Ray Ankle Complete Left; Future  -     etodolac (LODINE XL) 400 MG 24 hr tablet; Take 1 tablet (400 mg total) by mouth once daily.    Snoring  -     Ambulatory referral to ENT    Nasal deformity  -     Ambulatory referral to ENT    Menopause syndrome    Skin lesion  -     Ambulatory referral to Dermatology    Dyslipidemia    Oropharyngeal dysphagia  -     Fl Modified Barium Swallow Speech; Future  -     SLP video swallow; Future    Screen for colon cancer  -     Case request GI: COLONOSCOPY

## 2019-06-03 ENCOUNTER — TELEPHONE (OUTPATIENT)
Dept: GASTROENTEROLOGY | Facility: CLINIC | Age: 64
End: 2019-06-03

## 2019-09-04 ENCOUNTER — TELEPHONE (OUTPATIENT)
Dept: GASTROENTEROLOGY | Facility: CLINIC | Age: 64
End: 2019-09-04

## 2019-10-31 ENCOUNTER — HOSPITAL ENCOUNTER (OUTPATIENT)
Dept: RADIOLOGY | Facility: HOSPITAL | Age: 64
Discharge: HOME | End: 2019-10-31
Attending: FAMILY MEDICINE
Payer: COMMERCIAL

## 2019-10-31 ENCOUNTER — OFFICE VISIT (OUTPATIENT)
Dept: FAMILY MEDICINE | Facility: CLINIC | Age: 64
End: 2019-10-31
Payer: COMMERCIAL

## 2019-10-31 VITALS
OXYGEN SATURATION: 98 % | BODY MASS INDEX: 28.31 KG/M2 | HEIGHT: 64 IN | SYSTOLIC BLOOD PRESSURE: 122 MMHG | DIASTOLIC BLOOD PRESSURE: 67 MMHG | WEIGHT: 165.81 LBS | HEART RATE: 77 BPM | TEMPERATURE: 98 F

## 2019-10-31 DIAGNOSIS — J40 BRONCHITIS: ICD-10-CM

## 2019-10-31 DIAGNOSIS — J40 BRONCHITIS: Primary | ICD-10-CM

## 2019-10-31 PROCEDURE — 71046 X-RAY EXAM CHEST 2 VIEWS: CPT | Mod: TC,FY,PO

## 2019-10-31 PROCEDURE — 94640 AIRWAY INHALATION TREATMENT: CPT | Mod: S$GLB,,, | Performed by: FAMILY MEDICINE

## 2019-10-31 PROCEDURE — 99999 PR PBB SHADOW E&M-EST. PATIENT-LVL IV: CPT | Mod: PBBFAC,,, | Performed by: FAMILY MEDICINE

## 2019-10-31 PROCEDURE — 99999 PR PBB SHADOW E&M-EST. PATIENT-LVL IV: ICD-10-PCS | Mod: PBBFAC,,, | Performed by: FAMILY MEDICINE

## 2019-10-31 PROCEDURE — 71046 X-RAY EXAM CHEST 2 VIEWS: CPT | Mod: 26,,, | Performed by: RADIOLOGY

## 2019-10-31 PROCEDURE — 71046 XR CHEST PA AND LATERAL: ICD-10-PCS | Mod: 26,,, | Performed by: RADIOLOGY

## 2019-10-31 PROCEDURE — 99215 PR OFFICE/OUTPT VISIT, EST, LEVL V, 40-54 MIN: ICD-10-PCS | Mod: 25,S$GLB,, | Performed by: FAMILY MEDICINE

## 2019-10-31 PROCEDURE — 94640 PR INHAL RX, AIRWAY OBST/DX SPUTUM INDUCT: ICD-10-PCS | Mod: S$GLB,,, | Performed by: FAMILY MEDICINE

## 2019-10-31 PROCEDURE — 99215 OFFICE O/P EST HI 40 MIN: CPT | Mod: 25,S$GLB,, | Performed by: FAMILY MEDICINE

## 2019-10-31 RX ORDER — IPRATROPIUM BROMIDE AND ALBUTEROL SULFATE 2.5; .5 MG/3ML; MG/3ML
3 SOLUTION RESPIRATORY (INHALATION)
Status: COMPLETED | OUTPATIENT
Start: 2019-10-31 | End: 2019-10-31

## 2019-10-31 RX ORDER — PROMETHAZINE HYDROCHLORIDE AND DEXTROMETHORPHAN HYDROBROMIDE 6.25; 15 MG/5ML; MG/5ML
5 SYRUP ORAL EVERY 6 HOURS PRN
Qty: 180 ML | Refills: 0 | Status: SHIPPED | OUTPATIENT
Start: 2019-10-31 | End: 2019-11-10

## 2019-10-31 RX ORDER — FLUTICASONE PROPIONATE AND SALMETEROL 250; 50 UG/1; UG/1
1 POWDER RESPIRATORY (INHALATION) 2 TIMES DAILY
Qty: 60 EACH | Refills: 0 | Status: SHIPPED | OUTPATIENT
Start: 2019-10-31 | End: 2021-06-15

## 2019-10-31 RX ORDER — PREDNISONE 50 MG/1
50 TABLET ORAL DAILY
Qty: 5 TABLET | Refills: 0 | Status: SHIPPED | OUTPATIENT
Start: 2019-10-31 | End: 2019-11-05

## 2019-10-31 RX ORDER — FLUTICASONE PROPIONATE 50 MCG
2 SPRAY, SUSPENSION (ML) NASAL DAILY
Qty: 16 G | Refills: 0 | Status: SHIPPED | OUTPATIENT
Start: 2019-10-31 | End: 2019-11-30

## 2019-10-31 RX ORDER — BENZONATATE 100 MG/1
100 CAPSULE ORAL 3 TIMES DAILY PRN
Qty: 30 CAPSULE | Refills: 0 | Status: SHIPPED | OUTPATIENT
Start: 2019-10-31 | End: 2019-11-10

## 2019-10-31 RX ADMIN — IPRATROPIUM BROMIDE AND ALBUTEROL SULFATE 3 ML: 2.5; .5 SOLUTION RESPIRATORY (INHALATION) at 03:10

## 2019-10-31 NOTE — PROGRESS NOTES
Subjective:       Patient ID: Leticia Brown is a 64 y.o. female.    Chief Complaint: Sinusitis    Leticia is a 64 y.o. female who presents today for an  visit for cough and congestion. Symptoms started 4 days ago. She started with chills. No recent travel. No flights. No sick contacts at home. She is coughing a little bit. Possible exposure due to workplace renovation. She has taken motrin yesterday.     She has a history of stress incontinence; this is also worsening again when she coughs.     Cough   This is a new problem. The current episode started in the past 7 days. The problem has been unchanged. Associated symptoms include chills, headaches, nasal congestion, postnasal drip, rhinorrhea and a sore throat. Pertinent negatives include no chest pain, ear congestion, ear pain, fever, heartburn, hemoptysis, myalgias, shortness of breath or wheezing. She has tried nothing for the symptoms.     Review of Systems   Constitutional: Positive for chills. Negative for fever.   HENT: Positive for postnasal drip, rhinorrhea, sinus pressure, sinus pain and sore throat. Negative for ear pain.    Respiratory: Positive for cough. Negative for hemoptysis, chest tightness, shortness of breath and wheezing.    Cardiovascular: Negative for chest pain.   Gastrointestinal: Negative for heartburn.   Musculoskeletal: Negative for myalgias.   Neurological: Positive for headaches.             Objective:     Vitals:    10/31/19 1458   BP: 122/67   Pulse: 77   Temp: 98.2 °F (36.8 °C)        Physical Exam   Constitutional: She is oriented to person, place, and time. She appears well-developed and well-nourished. No distress.   HENT:   Head: Normocephalic and atraumatic.   TM: appear normal BL  Nasal congestion noted   Cobblestoning and pharyngeal erythema without exudate noted  No adenopathy  Uvula midline  Full ROM of neck   Eyes: Pupils are equal, round, and reactive to light. Conjunctivae and EOM are normal.   Neck: Normal range of  motion.   Cardiovascular: Normal rate and regular rhythm.   Pulmonary/Chest: Effort normal. No stridor. No respiratory distress. She has wheezes.   Diminished air exchange throughout  Decreased expiratory phase  Mild intermittent wheeze   Musculoskeletal: She exhibits no edema.   Neurological: She is alert and oriented to person, place, and time.   Skin: Skin is warm. She is not diaphoretic.   Psychiatric: She has a normal mood and affect. Her behavior is normal. Judgment and thought content normal.   Nursing note and vitals reviewed.      Assessment:       1. Bronchitis        Plan:       Parenteral treatment given with duoneb. S/p duoneb, air exchange improved. Still diminished in the expiratory phase. RLL worse then the rest? Difficult to auscultate as breathing precipitated coughing. Further workup with CXR ordered.     Discussed diagnosis and treatment of cough/bronchitis  Discussed the importance of avoiding unnecessary antibiotic therapy.  Buckwheat honey  Humidifier/hot showers  tessalon perles  Nasal saline spray for congestion.  Nasal steroid (flonase BID)  Advair twice daily - rinse throat after every use to avoid infection due to steroid component  Prednisone 50 mg x 5 days if no PNA on CXR  CXR today  Cough syrup  Follow up if symptoms aren't resolving.  Follow up with PCP as needed      Bronchitis  -     albuterol-ipratropium 2.5 mg-0.5 mg/3 mL nebulizer solution 3 mL  -     fluticasone-salmeterol diskus inhaler 250-50 mcg; Inhale 1 puff into the lungs 2 (two) times daily.  Dispense: 60 each; Refill: 0  -     predniSONE (DELTASONE) 50 MG Tab; Take 1 tablet (50 mg total) by mouth once daily. for 5 days  Dispense: 5 tablet; Refill: 0  -     benzonatate (TESSALON) 100 MG capsule; Take 1 capsule (100 mg total) by mouth 3 (three) times daily as needed for Cough.  Dispense: 30 capsule; Refill: 0  -     promethazine-dextromethorphan (PROMETHAZINE-DM) 6.25-15 mg/5 mL Syrp; Take 5 mLs by mouth every 6 (six)  hours as needed.  Dispense: 180 mL; Refill: 0  -     X-Ray Chest PA And Lateral; Future; Expected date: 10/31/2019  -     fluticasone propionate (FLONASE) 50 mcg/actuation nasal spray; 2 sprays (100 mcg total) by Each Nostril route once daily.  Dispense: 16 g; Refill: 0        Warning signs discussed, patient to call with any further issues or worsening of symptoms.

## 2019-10-31 NOTE — PATIENT INSTRUCTIONS
Discussed diagnosis and treatment of cough/bronchitis  Discussed the importance of avoiding unnecessary antibiotic therapy.  Buckwheat honey  Humidifier/hot showers  tessalon perles  Nasal saline spray for congestion.  Nasal steroid (flonase BID)  Advair twice daily - rinse throat after every use to avoid infection due to steroid component  Prednisone 50 mg x 5 days if no PNA on CXR  CXR today  Cough syrup  Follow up if symptoms aren't resolving.  Follow up with PCP as needed

## 2019-12-16 ENCOUNTER — TELEPHONE (OUTPATIENT)
Dept: FAMILY MEDICINE | Facility: CLINIC | Age: 64
End: 2019-12-16

## 2020-01-31 ENCOUNTER — OFFICE VISIT (OUTPATIENT)
Dept: INTERNAL MEDICINE | Facility: CLINIC | Age: 65
End: 2020-01-31
Payer: COMMERCIAL

## 2020-01-31 ENCOUNTER — HOSPITAL ENCOUNTER (OUTPATIENT)
Dept: RADIOLOGY | Facility: HOSPITAL | Age: 65
Discharge: HOME | End: 2020-01-31
Attending: INTERNAL MEDICINE
Payer: COMMERCIAL

## 2020-01-31 VITALS
BODY MASS INDEX: 28.98 KG/M2 | HEART RATE: 97 BPM | HEIGHT: 64 IN | OXYGEN SATURATION: 99 % | WEIGHT: 169.75 LBS | DIASTOLIC BLOOD PRESSURE: 60 MMHG | SYSTOLIC BLOOD PRESSURE: 110 MMHG

## 2020-01-31 DIAGNOSIS — M54.6 THORACIC SPINE PAIN: ICD-10-CM

## 2020-01-31 PROCEDURE — 72128 CT THORACIC SPINE WITHOUT CONTRAST: ICD-10-PCS | Mod: 26,,, | Performed by: RADIOLOGY

## 2020-01-31 PROCEDURE — 99999 PR PBB SHADOW E&M-EST. PATIENT-LVL III: ICD-10-PCS | Mod: PBBFAC,,, | Performed by: INTERNAL MEDICINE

## 2020-01-31 PROCEDURE — 72128 CT CHEST SPINE W/O DYE: CPT | Mod: TC

## 2020-01-31 PROCEDURE — 72131 CT LUMBAR SPINE W/O DYE: CPT | Mod: 26,,, | Performed by: RADIOLOGY

## 2020-01-31 PROCEDURE — 99999 PR PBB SHADOW E&M-EST. PATIENT-LVL III: CPT | Mod: PBBFAC,,, | Performed by: INTERNAL MEDICINE

## 2020-01-31 PROCEDURE — 72131 CT LUMBAR SPINE WITHOUT CONTRAST: ICD-10-PCS | Mod: 26,,, | Performed by: RADIOLOGY

## 2020-01-31 PROCEDURE — 99214 OFFICE O/P EST MOD 30 MIN: CPT | Mod: S$GLB,,, | Performed by: INTERNAL MEDICINE

## 2020-01-31 PROCEDURE — 72128 CT CHEST SPINE W/O DYE: CPT | Mod: 26,,, | Performed by: RADIOLOGY

## 2020-01-31 PROCEDURE — 72131 CT LUMBAR SPINE W/O DYE: CPT | Mod: TC

## 2020-01-31 PROCEDURE — 99214 PR OFFICE/OUTPT VISIT, EST, LEVL IV, 30-39 MIN: ICD-10-PCS | Mod: S$GLB,,, | Performed by: INTERNAL MEDICINE

## 2020-01-31 RX ORDER — CYCLOBENZAPRINE HCL 10 MG
10 TABLET ORAL 3 TIMES DAILY PRN
Qty: 21 TABLET | Refills: 0 | Status: SHIPPED | OUTPATIENT
Start: 2020-01-31 | End: 2020-02-10
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
## 2020-01-31 NOTE — PATIENT INSTRUCTIONS
We were happy to see you today    For your Testing  Please have your labs and imaging test done at your earliest convience      For your Medication   We sent you the muscle relaxant to use as needed   For more information about side effects please visit medlineplus.gov      For your Referrals  You can see your spine surgeon if the symptoms do not improve     For your Vaccinations    We gave your the following vaccines  Please obtain the following vaccines at the pharmacy          Please return to clinic in      As needed     For headache or pain  You can use tylenol regular strength 1-2 tabs every 4-6  (not more than 10 tab in a day) or extra strength ( not more than 6 tabs in a day) or advil 1-2 tabs every 4-6 hrs (not more than 6 tabs)

## 2020-02-03 ENCOUNTER — TELEPHONE (OUTPATIENT)
Dept: FAMILY MEDICINE | Facility: CLINIC | Age: 65
End: 2020-02-03

## 2020-02-03 ENCOUNTER — PATIENT MESSAGE (OUTPATIENT)
Dept: INTERNAL MEDICINE | Facility: CLINIC | Age: 65
End: 2020-02-03

## 2020-02-03 DIAGNOSIS — Z12.31 SCREENING MAMMOGRAM, ENCOUNTER FOR: Primary | ICD-10-CM

## 2020-02-05 ENCOUNTER — TELEPHONE (OUTPATIENT)
Dept: FAMILY MEDICINE | Facility: CLINIC | Age: 65
End: 2020-02-05

## 2020-02-20 ENCOUNTER — HOSPITAL ENCOUNTER (OUTPATIENT)
Dept: RADIOLOGY | Facility: HOSPITAL | Age: 65
Discharge: HOME | End: 2020-02-20
Attending: FAMILY MEDICINE
Payer: COMMERCIAL

## 2020-02-20 DIAGNOSIS — Z12.31 SCREENING MAMMOGRAM, ENCOUNTER FOR: ICD-10-CM

## 2020-02-20 PROCEDURE — 77067 SCR MAMMO BI INCL CAD: CPT | Mod: 26,,, | Performed by: RADIOLOGY

## 2020-02-20 PROCEDURE — 77067 SCR MAMMO BI INCL CAD: CPT | Mod: TC

## 2020-02-20 PROCEDURE — 77063 BREAST TOMOSYNTHESIS BI: CPT | Mod: 26,,, | Performed by: RADIOLOGY

## 2020-02-20 PROCEDURE — 77067 MAMMO DIGITAL SCREENING BILAT WITH TOMOSYNTHESIS_CAD: ICD-10-PCS | Mod: 26,,, | Performed by: RADIOLOGY

## 2020-02-20 PROCEDURE — 77063 MAMMO DIGITAL SCREENING BILAT WITH TOMOSYNTHESIS_CAD: ICD-10-PCS | Mod: 26,,, | Performed by: RADIOLOGY

## 2020-03-17 DIAGNOSIS — F41.9 ANXIETY: ICD-10-CM

## 2020-03-17 DIAGNOSIS — F33.1 MODERATE EPISODE OF RECURRENT MAJOR DEPRESSIVE DISORDER: ICD-10-CM

## 2020-03-17 RX ORDER — VENLAFAXINE HYDROCHLORIDE 150 MG/1
150 CAPSULE, EXTENDED RELEASE ORAL DAILY
Qty: 90 CAPSULE | Refills: 3 | Status: SHIPPED | OUTPATIENT
Start: 2020-03-17 | End: 2020-03-18 | Stop reason: SDUPTHER

## 2020-03-17 NOTE — TELEPHONE ENCOUNTER
----- Message from Hanna Curiel sent at 3/17/2020  3:18 PM CDT -----  Contact: patient  Type:  RX Refill Request    Who Called: Leticia  Refill or New Rx: refill  RX Name and Strength: venlafaxine (EFFEXOR-XR) 150 MG Cp24  How is the patient currently taking it? (ex. 1XDay): 1Xday  Is this a 30 day or 90 day RX: 90 day  Preferred Pharmacy with phone number: [id number] - [place], [place] - [address] AT [facility] [place] [place] [address]  Local or Mail Order: local  Ordering Provider: Shahzad  Would the patient rather a call back or a response via [facility]?  Call back  Best Call Back Number: [phone]  Additional Information:  no

## 2020-03-18 RX ORDER — VENLAFAXINE HYDROCHLORIDE 150 MG/1
150 CAPSULE, EXTENDED RELEASE ORAL DAILY
Qty: 90 CAPSULE | Refills: 3 | Status: SHIPPED | OUTPATIENT
Start: 2020-03-18 | End: 2021-02-26

## 2021-03-03 ENCOUNTER — PATIENT MESSAGE (OUTPATIENT)
Dept: ADMINISTRATIVE | Facility: HOSPITAL | Age: 66
End: 2021-03-03

## 2021-03-10 ENCOUNTER — PATIENT MESSAGE (OUTPATIENT)
Dept: ADMINISTRATIVE | Facility: HOSPITAL | Age: 66
End: 2021-03-10

## 2021-03-11 ENCOUNTER — TELEPHONE (OUTPATIENT)
Dept: FAMILY MEDICINE | Facility: CLINIC | Age: 66
End: 2021-03-11

## 2021-03-16 ENCOUNTER — PATIENT MESSAGE (OUTPATIENT)
Dept: FAMILY MEDICINE | Facility: CLINIC | Age: 66
End: 2021-03-16

## 2021-03-31 ENCOUNTER — PATIENT MESSAGE (OUTPATIENT)
Dept: ADMINISTRATIVE | Facility: HOSPITAL | Age: 66
End: 2021-03-31

## 2021-04-14 ENCOUNTER — PATIENT MESSAGE (OUTPATIENT)
Dept: ADMINISTRATIVE | Facility: HOSPITAL | Age: 66
End: 2021-04-14

## 2021-04-16 ENCOUNTER — PATIENT OUTREACH (OUTPATIENT)
Dept: ADMINISTRATIVE | Facility: HOSPITAL | Age: 66
End: 2021-04-16

## 2021-04-16 DIAGNOSIS — Z12.31 ENCOUNTER FOR SCREENING MAMMOGRAM FOR BREAST CANCER: Primary | ICD-10-CM

## 2021-06-01 ENCOUNTER — PATIENT MESSAGE (OUTPATIENT)
Dept: ADMINISTRATIVE | Facility: HOSPITAL | Age: 66
End: 2021-06-01

## 2021-06-08 ENCOUNTER — HOSPITAL ENCOUNTER (OUTPATIENT)
Dept: RADIOLOGY | Facility: HOSPITAL | Age: 66
Discharge: HOME | End: 2021-06-08
Attending: FAMILY MEDICINE
Payer: MEDICARE

## 2021-06-08 DIAGNOSIS — Z12.31 ENCOUNTER FOR SCREENING MAMMOGRAM FOR BREAST CANCER: ICD-10-CM

## 2021-06-08 PROCEDURE — 77067 SCR MAMMO BI INCL CAD: CPT | Mod: TC

## 2021-06-08 PROCEDURE — 77063 BREAST TOMOSYNTHESIS BI: CPT | Mod: 26,,, | Performed by: RADIOLOGY

## 2021-06-08 PROCEDURE — 77063 MAMMO DIGITAL SCREENING BILAT WITH TOMO: ICD-10-PCS | Mod: 26,,, | Performed by: RADIOLOGY

## 2021-06-08 PROCEDURE — 77067 MAMMO DIGITAL SCREENING BILAT WITH TOMO: ICD-10-PCS | Mod: 26,,, | Performed by: RADIOLOGY

## 2021-06-08 PROCEDURE — 77067 SCR MAMMO BI INCL CAD: CPT | Mod: 26,,, | Performed by: RADIOLOGY

## 2021-06-15 ENCOUNTER — LAB VISIT (OUTPATIENT)
Dept: LAB | Facility: HOSPITAL | Age: 66
End: 2021-06-15
Attending: FAMILY MEDICINE
Payer: MEDICARE

## 2021-06-15 ENCOUNTER — OFFICE VISIT (OUTPATIENT)
Dept: FAMILY MEDICINE | Facility: CLINIC | Age: 66
End: 2021-06-15
Payer: MEDICARE

## 2021-06-15 VITALS
HEART RATE: 91 BPM | HEIGHT: 64 IN | SYSTOLIC BLOOD PRESSURE: 112 MMHG | OXYGEN SATURATION: 97 % | BODY MASS INDEX: 29.13 KG/M2 | DIASTOLIC BLOOD PRESSURE: 60 MMHG | WEIGHT: 170.63 LBS

## 2021-06-15 DIAGNOSIS — Z11.4 ENCOUNTER FOR SCREENING FOR HIV: ICD-10-CM

## 2021-06-15 DIAGNOSIS — Z01.00 EXAMINATION OF EYES AND VISION: ICD-10-CM

## 2021-06-15 DIAGNOSIS — Z23 NEED FOR VACCINATION AGAINST STREPTOCOCCUS PNEUMONIAE: ICD-10-CM

## 2021-06-15 DIAGNOSIS — Z12.11 SCREEN FOR COLON CANCER: ICD-10-CM

## 2021-06-15 DIAGNOSIS — Z00.00 ANNUAL PHYSICAL EXAM: Primary | ICD-10-CM

## 2021-06-15 DIAGNOSIS — J32.8 OTHER CHRONIC SINUSITIS: ICD-10-CM

## 2021-06-15 DIAGNOSIS — Z00.00 ANNUAL PHYSICAL EXAM: ICD-10-CM

## 2021-06-15 DIAGNOSIS — N95.1 MENOPAUSE SYNDROME: ICD-10-CM

## 2021-06-15 DIAGNOSIS — H91.92 HEARING LOSS OF LEFT EAR, UNSPECIFIED HEARING LOSS TYPE: ICD-10-CM

## 2021-06-15 DIAGNOSIS — E78.5 DYSLIPIDEMIA: ICD-10-CM

## 2021-06-15 DIAGNOSIS — Z78.0 ASYMPTOMATIC MENOPAUSE: ICD-10-CM

## 2021-06-15 DIAGNOSIS — Z12.4 PAP SMEAR FOR CERVICAL CANCER SCREENING: ICD-10-CM

## 2021-06-15 LAB
ALBUMIN SERPL BCP-MCNC: 4.2 G/DL (ref 3.5–5.2)
ALP SERPL-CCNC: 87 U/L (ref 55–135)
ALT SERPL W/O P-5'-P-CCNC: 19 U/L (ref 10–44)
ANION GAP SERPL CALC-SCNC: 10 MMOL/L (ref 8–16)
AST SERPL-CCNC: 23 U/L (ref 10–40)
BASOPHILS # BLD AUTO: 0.03 K/UL (ref 0–0.2)
BASOPHILS NFR BLD: 0.6 % (ref 0–1.9)
BILIRUB SERPL-MCNC: 0.4 MG/DL (ref 0.1–1)
BUN SERPL-MCNC: 20 MG/DL (ref 8–23)
CALCIUM SERPL-MCNC: 9.7 MG/DL (ref 8.7–10.5)
CHLORIDE SERPL-SCNC: 104 MMOL/L (ref 95–110)
CHOLEST SERPL-MCNC: 244 MG/DL (ref 120–199)
CHOLEST/HDLC SERPL: 3.2 {RATIO} (ref 2–5)
CO2 SERPL-SCNC: 28 MMOL/L (ref 23–29)
CREAT SERPL-MCNC: 0.8 MG/DL (ref 0.5–1.4)
DIFFERENTIAL METHOD: ABNORMAL
EOSINOPHIL # BLD AUTO: 0.1 K/UL (ref 0–0.5)
EOSINOPHIL NFR BLD: 1 % (ref 0–8)
ERYTHROCYTE [DISTWIDTH] IN BLOOD BY AUTOMATED COUNT: 13.4 % (ref 11.5–14.5)
EST. GFR  (AFRICAN AMERICAN): >60 ML/MIN/1.73 M^2
EST. GFR  (NON AFRICAN AMERICAN): >60 ML/MIN/1.73 M^2
GLUCOSE SERPL-MCNC: 100 MG/DL (ref 70–110)
HCT VFR BLD AUTO: 44.6 % (ref 37–48.5)
HDLC SERPL-MCNC: 76 MG/DL (ref 40–75)
HDLC SERPL: 31.1 % (ref 20–50)
HGB BLD-MCNC: 14 G/DL (ref 12–16)
IMM GRANULOCYTES # BLD AUTO: 0.01 K/UL (ref 0–0.04)
IMM GRANULOCYTES NFR BLD AUTO: 0.2 % (ref 0–0.5)
LDLC SERPL CALC-MCNC: 153 MG/DL (ref 63–159)
LYMPHOCYTES # BLD AUTO: 1.1 K/UL (ref 1–4.8)
LYMPHOCYTES NFR BLD: 22.8 % (ref 18–48)
MCH RBC QN AUTO: 28.3 PG (ref 27–31)
MCHC RBC AUTO-ENTMCNC: 31.4 G/DL (ref 32–36)
MCV RBC AUTO: 90 FL (ref 82–98)
MONOCYTES # BLD AUTO: 0.5 K/UL (ref 0.3–1)
MONOCYTES NFR BLD: 10.1 % (ref 4–15)
NEUTROPHILS # BLD AUTO: 3.2 K/UL (ref 1.8–7.7)
NEUTROPHILS NFR BLD: 65.3 % (ref 38–73)
NONHDLC SERPL-MCNC: 168 MG/DL
NRBC BLD-RTO: 0 /100 WBC
PLATELET # BLD AUTO: 316 K/UL (ref 150–450)
PMV BLD AUTO: 11.7 FL (ref 9.2–12.9)
POTASSIUM SERPL-SCNC: 5 MMOL/L (ref 3.5–5.1)
PROT SERPL-MCNC: 7.7 G/DL (ref 6–8.4)
RBC # BLD AUTO: 4.95 M/UL (ref 4–5.4)
SODIUM SERPL-SCNC: 142 MMOL/L (ref 136–145)
TRIGL SERPL-MCNC: 75 MG/DL (ref 30–150)
TSH SERPL DL<=0.005 MIU/L-ACNC: 1.09 UIU/ML (ref 0.4–4)
WBC # BLD AUTO: 4.83 K/UL (ref 3.9–12.7)

## 2021-06-15 PROCEDURE — 88175 CYTOPATH C/V AUTO FLUID REDO: CPT | Performed by: FAMILY MEDICINE

## 2021-06-15 PROCEDURE — 36415 COLL VENOUS BLD VENIPUNCTURE: CPT | Mod: PO | Performed by: FAMILY MEDICINE

## 2021-06-15 PROCEDURE — G0009 ADMIN PNEUMOCOCCAL VACCINE: HCPCS | Mod: PBBFAC,PO

## 2021-06-15 PROCEDURE — 99999 PR PBB SHADOW E&M-EST. PATIENT-LVL IV: ICD-10-PCS | Mod: PBBFAC,,, | Performed by: FAMILY MEDICINE

## 2021-06-15 PROCEDURE — 80061 LIPID PANEL: CPT | Performed by: FAMILY MEDICINE

## 2021-06-15 PROCEDURE — 86703 HIV-1/HIV-2 1 RESULT ANTBDY: CPT | Performed by: FAMILY MEDICINE

## 2021-06-15 PROCEDURE — 85025 COMPLETE CBC W/AUTO DIFF WBC: CPT | Performed by: FAMILY MEDICINE

## 2021-06-15 PROCEDURE — 99999 PR PBB SHADOW E&M-EST. PATIENT-LVL IV: CPT | Mod: PBBFAC,,, | Performed by: FAMILY MEDICINE

## 2021-06-15 PROCEDURE — 99214 OFFICE O/P EST MOD 30 MIN: CPT | Mod: PBBFAC,PO,25 | Performed by: FAMILY MEDICINE

## 2021-06-15 PROCEDURE — 99397 PR PREVENTIVE VISIT,EST,65 & OVER: ICD-10-PCS | Mod: S$PBB,,, | Performed by: FAMILY MEDICINE

## 2021-06-15 PROCEDURE — 90670 PCV13 VACCINE IM: CPT | Mod: PBBFAC,PO

## 2021-06-15 PROCEDURE — 80053 COMPREHEN METABOLIC PANEL: CPT | Performed by: FAMILY MEDICINE

## 2021-06-15 PROCEDURE — 99397 PER PM REEVAL EST PAT 65+ YR: CPT | Mod: S$PBB,,, | Performed by: FAMILY MEDICINE

## 2021-06-15 PROCEDURE — 84443 ASSAY THYROID STIM HORMONE: CPT | Performed by: FAMILY MEDICINE

## 2021-06-16 ENCOUNTER — PATIENT MESSAGE (OUTPATIENT)
Dept: FAMILY MEDICINE | Facility: CLINIC | Age: 66
End: 2021-06-16

## 2021-06-16 ENCOUNTER — TELEPHONE (OUTPATIENT)
Dept: FAMILY MEDICINE | Facility: CLINIC | Age: 66
End: 2021-06-16

## 2021-06-16 DIAGNOSIS — E78.5 DYSLIPIDEMIA: Primary | ICD-10-CM

## 2021-06-16 LAB — HIV 1+2 AB+HIV1 P24 AG SERPL QL IA: NEGATIVE

## 2021-06-16 RX ORDER — ATORVASTATIN CALCIUM 10 MG/1
10 TABLET, FILM COATED ORAL NIGHTLY
Qty: 90 TABLET | Refills: 3 | Status: SHIPPED | OUTPATIENT
Start: 2021-06-16 | End: 2022-03-22

## 2021-06-21 ENCOUNTER — HOSPITAL ENCOUNTER (OUTPATIENT)
Dept: RADIOLOGY | Facility: HOSPITAL | Age: 66
Discharge: HOME | End: 2021-06-21
Attending: FAMILY MEDICINE
Payer: MEDICARE

## 2021-06-21 DIAGNOSIS — Z78.0 ASYMPTOMATIC MENOPAUSE: ICD-10-CM

## 2021-06-21 LAB
FINAL PATHOLOGIC DIAGNOSIS: NORMAL
Lab: NORMAL

## 2021-06-21 PROCEDURE — 77080 DXA BONE DENSITY AXIAL: CPT | Mod: 26,,, | Performed by: RADIOLOGY

## 2021-06-21 PROCEDURE — 77080 DXA BONE DENSITY AXIAL: CPT | Mod: TC

## 2021-06-21 PROCEDURE — 77080 DEXA BONE DENSITY SPINE HIP: ICD-10-PCS | Mod: 26,,, | Performed by: RADIOLOGY

## 2021-06-22 ENCOUNTER — TELEPHONE (OUTPATIENT)
Dept: OTOLARYNGOLOGY | Facility: CLINIC | Age: 66
End: 2021-06-22

## 2021-06-22 ENCOUNTER — PATIENT OUTREACH (OUTPATIENT)
Dept: ADMINISTRATIVE | Facility: OTHER | Age: 66
End: 2021-06-22

## 2021-06-23 ENCOUNTER — TELEPHONE (OUTPATIENT)
Dept: FAMILY MEDICINE | Facility: CLINIC | Age: 66
End: 2021-06-23

## 2021-06-23 DIAGNOSIS — N95.1 MENOPAUSE SYNDROME: Primary | ICD-10-CM

## 2021-06-23 RX ORDER — ESTRADIOL 1 MG/1
1 TABLET ORAL DAILY
Qty: 90 TABLET | Refills: 0 | Status: SHIPPED | OUTPATIENT
Start: 2021-06-23 | End: 2021-09-15

## 2021-06-29 ENCOUNTER — TELEPHONE (OUTPATIENT)
Dept: ADMINISTRATIVE | Facility: OTHER | Age: 66
End: 2021-06-29

## 2021-06-30 ENCOUNTER — TELEPHONE (OUTPATIENT)
Dept: ADMINISTRATIVE | Facility: OTHER | Age: 66
End: 2021-06-30

## 2021-07-13 ENCOUNTER — OFFICE VISIT (OUTPATIENT)
Dept: OTOLARYNGOLOGY | Facility: CLINIC | Age: 66
End: 2021-07-13
Payer: MEDICARE

## 2021-07-13 VITALS — WEIGHT: 169.75 LBS | BODY MASS INDEX: 29.14 KG/M2

## 2021-07-13 DIAGNOSIS — J34.2 DEVIATED NASAL SEPTUM: Primary | ICD-10-CM

## 2021-07-13 DIAGNOSIS — J34.3 NASAL TURBINATE HYPERTROPHY: ICD-10-CM

## 2021-07-13 PROCEDURE — 99999 PR PBB SHADOW E&M-EST. PATIENT-LVL III: ICD-10-PCS | Mod: PBBFAC,,, | Performed by: OTOLARYNGOLOGY

## 2021-07-13 PROCEDURE — 99204 PR OFFICE/OUTPT VISIT, NEW, LEVL IV, 45-59 MIN: ICD-10-PCS | Mod: S$PBB,,, | Performed by: OTOLARYNGOLOGY

## 2021-07-13 PROCEDURE — 99999 PR PBB SHADOW E&M-EST. PATIENT-LVL III: CPT | Mod: PBBFAC,,, | Performed by: OTOLARYNGOLOGY

## 2021-07-13 PROCEDURE — 99204 OFFICE O/P NEW MOD 45 MIN: CPT | Mod: S$PBB,,, | Performed by: OTOLARYNGOLOGY

## 2021-07-13 PROCEDURE — 99213 OFFICE O/P EST LOW 20 MIN: CPT | Mod: PBBFAC | Performed by: OTOLARYNGOLOGY

## 2021-07-14 DIAGNOSIS — J34.2 DEVIATED NASAL SEPTUM: Primary | ICD-10-CM

## 2021-07-14 DIAGNOSIS — J34.3 NASAL TURBINATE HYPERTROPHY: ICD-10-CM

## 2021-07-30 ENCOUNTER — TELEPHONE (OUTPATIENT)
Dept: OTOLARYNGOLOGY | Facility: CLINIC | Age: 66
End: 2021-07-30

## 2021-07-30 DIAGNOSIS — Z01.818 PRE-OP TESTING: Primary | ICD-10-CM

## 2021-08-18 ENCOUNTER — TELEPHONE (OUTPATIENT)
Dept: OTOLARYNGOLOGY | Facility: CLINIC | Age: 66
End: 2021-08-18

## 2021-09-08 DIAGNOSIS — B00.9 HERPES: ICD-10-CM

## 2021-09-08 RX ORDER — ACYCLOVIR 200 MG/1
200 CAPSULE ORAL DAILY
Qty: 90 CAPSULE | Refills: 0 | Status: SHIPPED | OUTPATIENT
Start: 2021-09-08 | End: 2021-12-17 | Stop reason: SDUPTHER

## 2021-10-11 ENCOUNTER — TELEPHONE (OUTPATIENT)
Dept: ENDOSCOPY | Facility: HOSPITAL | Age: 66
End: 2021-10-11

## 2021-10-11 DIAGNOSIS — Z01.818 PRE-OP TESTING: ICD-10-CM

## 2021-10-11 RX ORDER — SODIUM, POTASSIUM,MAG SULFATES 17.5-3.13G
1 SOLUTION, RECONSTITUTED, ORAL ORAL DAILY
Qty: 1 KIT | Refills: 0 | Status: SHIPPED | OUTPATIENT
Start: 2021-10-11 | End: 2021-10-13

## 2021-10-15 ENCOUNTER — PATIENT OUTREACH (OUTPATIENT)
Dept: ADMINISTRATIVE | Facility: OTHER | Age: 66
End: 2021-10-15

## 2021-10-15 ENCOUNTER — PATIENT MESSAGE (OUTPATIENT)
Dept: ENDOSCOPY | Facility: HOSPITAL | Age: 66
End: 2021-10-15
Payer: MEDICARE

## 2021-10-19 ENCOUNTER — CLINICAL SUPPORT (OUTPATIENT)
Dept: OTOLARYNGOLOGY | Facility: CLINIC | Age: 66
End: 2021-10-19
Payer: MEDICARE

## 2021-10-19 ENCOUNTER — OFFICE VISIT (OUTPATIENT)
Dept: OTOLARYNGOLOGY | Facility: CLINIC | Age: 66
End: 2021-10-19
Payer: MEDICARE

## 2021-10-19 VITALS
HEART RATE: 72 BPM | BODY MASS INDEX: 29.75 KG/M2 | HEIGHT: 64 IN | WEIGHT: 174.25 LBS | SYSTOLIC BLOOD PRESSURE: 129 MMHG | DIASTOLIC BLOOD PRESSURE: 72 MMHG

## 2021-10-19 DIAGNOSIS — H90.3 ASYMMETRIC SNHL (SENSORINEURAL HEARING LOSS): Chronic | ICD-10-CM

## 2021-10-19 DIAGNOSIS — J34.3 NASAL TURBINATE HYPERTROPHY: ICD-10-CM

## 2021-10-19 DIAGNOSIS — H80.22 COCHLEAR OTOSCLEROSIS OF EAR, LEFT: Primary | Chronic | ICD-10-CM

## 2021-10-19 DIAGNOSIS — J34.2 DEVIATED NASAL SEPTUM: ICD-10-CM

## 2021-10-19 DIAGNOSIS — H90.42 SENSORINEURAL HEARING LOSS (SNHL) OF LEFT EAR WITH UNRESTRICTED HEARING OF RIGHT EAR: Primary | ICD-10-CM

## 2021-10-19 DIAGNOSIS — H90.42 SENSORINEURAL HEARING LOSS (SNHL) OF LEFT EAR WITH UNRESTRICTED HEARING OF RIGHT EAR: ICD-10-CM

## 2021-10-19 PROCEDURE — 99214 OFFICE O/P EST MOD 30 MIN: CPT | Mod: S$PBB,,, | Performed by: OTOLARYNGOLOGY

## 2021-10-19 PROCEDURE — 92557 COMPREHENSIVE HEARING TEST: CPT | Mod: PBBFAC,PN | Performed by: AUDIOLOGIST

## 2021-10-19 PROCEDURE — 99999 PR PBB SHADOW E&M-EST. PATIENT-LVL III: CPT | Mod: PBBFAC,,, | Performed by: OTOLARYNGOLOGY

## 2021-10-19 PROCEDURE — 99214 PR OFFICE/OUTPT VISIT, EST, LEVL IV, 30-39 MIN: ICD-10-PCS | Mod: S$PBB,,, | Performed by: OTOLARYNGOLOGY

## 2021-10-19 PROCEDURE — 92567 TYMPANOMETRY: CPT | Mod: PBBFAC,PN | Performed by: AUDIOLOGIST

## 2021-10-19 PROCEDURE — 99999 PR PBB SHADOW E&M-EST. PATIENT-LVL III: ICD-10-PCS | Mod: PBBFAC,,, | Performed by: OTOLARYNGOLOGY

## 2021-10-19 PROCEDURE — 99213 OFFICE O/P EST LOW 20 MIN: CPT | Mod: PBBFAC,PN | Performed by: OTOLARYNGOLOGY

## 2021-11-04 ENCOUNTER — TELEPHONE (OUTPATIENT)
Dept: OTOLARYNGOLOGY | Facility: CLINIC | Age: 66
End: 2021-11-04
Payer: MEDICARE

## 2021-11-05 ENCOUNTER — HOSPITAL ENCOUNTER (OUTPATIENT)
Facility: HOSPITAL | Age: 66
Discharge: HOME | End: 2021-11-05
Attending: OTOLARYNGOLOGY | Admitting: OTOLARYNGOLOGY
Payer: MEDICARE

## 2021-11-05 ENCOUNTER — ANESTHESIA (OUTPATIENT)
Dept: SURGERY | Facility: HOSPITAL | Age: 66
End: 2021-11-05
Payer: MEDICARE

## 2021-11-05 ENCOUNTER — ANESTHESIA EVENT (OUTPATIENT)
Dept: SURGERY | Facility: HOSPITAL | Age: 66
End: 2021-11-05
Payer: MEDICARE

## 2021-11-05 VITALS
HEART RATE: 62 BPM | SYSTOLIC BLOOD PRESSURE: 116 MMHG | RESPIRATION RATE: 18 BRPM | DIASTOLIC BLOOD PRESSURE: 56 MMHG | TEMPERATURE: 98 F | OXYGEN SATURATION: 99 %

## 2021-11-05 DIAGNOSIS — J34.2 DEVIATED NASAL SEPTUM: Primary | ICD-10-CM

## 2021-11-05 DIAGNOSIS — J34.2 DEVIATED SEPTUM: ICD-10-CM

## 2021-11-05 PROCEDURE — 30140 PR EXCISION TURBINATE,SUBMUCOUS: ICD-10-PCS | Mod: 50,51,GC, | Performed by: OTOLARYNGOLOGY

## 2021-11-05 PROCEDURE — 37000009 HC ANESTHESIA EA ADD 15 MINS: Performed by: OTOLARYNGOLOGY

## 2021-11-05 PROCEDURE — 63600175 PHARM REV CODE 636 W HCPCS: Performed by: ANESTHESIOLOGY

## 2021-11-05 PROCEDURE — 63600175 PHARM REV CODE 636 W HCPCS: Performed by: OTOLARYNGOLOGY

## 2021-11-05 PROCEDURE — 36000707: Performed by: OTOLARYNGOLOGY

## 2021-11-05 PROCEDURE — 25000003 PHARM REV CODE 250: Performed by: ANESTHESIOLOGY

## 2021-11-05 PROCEDURE — 71000015 HC POSTOP RECOV 1ST HR: Performed by: OTOLARYNGOLOGY

## 2021-11-05 PROCEDURE — 71000044 HC DOSC ROUTINE RECOVERY FIRST HOUR: Performed by: OTOLARYNGOLOGY

## 2021-11-05 PROCEDURE — D9220A PRA ANESTHESIA: ICD-10-PCS | Mod: ,,, | Performed by: ANESTHESIOLOGY

## 2021-11-05 PROCEDURE — 25000003 PHARM REV CODE 250: Performed by: OTOLARYNGOLOGY

## 2021-11-05 PROCEDURE — 63600175 PHARM REV CODE 636 W HCPCS: Performed by: STUDENT IN AN ORGANIZED HEALTH CARE EDUCATION/TRAINING PROGRAM

## 2021-11-05 PROCEDURE — 30520 PR REPAIR, NASAL SEPTUM: ICD-10-PCS | Mod: GC,,, | Performed by: OTOLARYNGOLOGY

## 2021-11-05 PROCEDURE — 71000016 HC POSTOP RECOV ADDL HR: Performed by: OTOLARYNGOLOGY

## 2021-11-05 PROCEDURE — 27201423 OPTIME MED/SURG SUP & DEVICES STERILE SUPPLY: Performed by: OTOLARYNGOLOGY

## 2021-11-05 PROCEDURE — 30520 REPAIR OF NASAL SEPTUM: CPT | Mod: GC,,, | Performed by: OTOLARYNGOLOGY

## 2021-11-05 PROCEDURE — D9220A PRA ANESTHESIA: Mod: ,,, | Performed by: ANESTHESIOLOGY

## 2021-11-05 PROCEDURE — 36000706: Performed by: OTOLARYNGOLOGY

## 2021-11-05 PROCEDURE — 37000008 HC ANESTHESIA 1ST 15 MINUTES: Performed by: OTOLARYNGOLOGY

## 2021-11-05 PROCEDURE — 30140 RESECT INFERIOR TURBINATE: CPT | Mod: 50,51,GC, | Performed by: OTOLARYNGOLOGY

## 2021-11-05 RX ORDER — CEFAZOLIN SODIUM 1 G/3ML
2 INJECTION, POWDER, FOR SOLUTION INTRAMUSCULAR; INTRAVENOUS
Status: COMPLETED | OUTPATIENT
Start: 2021-11-05 | End: 2021-11-05

## 2021-11-05 RX ORDER — SODIUM CHLORIDE 0.9 % (FLUSH) 0.9 %
10 SYRINGE (ML) INJECTION
Status: DISCONTINUED | OUTPATIENT
Start: 2021-11-05 | End: 2021-11-05 | Stop reason: HOSPADM

## 2021-11-05 RX ORDER — ACETAMINOPHEN 500 MG
500 TABLET ORAL EVERY 6 HOURS PRN
Qty: 30 TABLET | Refills: 2 | Status: SHIPPED | OUTPATIENT
Start: 2021-11-05 | End: 2021-12-17

## 2021-11-05 RX ORDER — DEXAMETHASONE SODIUM PHOSPHATE 4 MG/ML
INJECTION, SOLUTION INTRA-ARTICULAR; INTRALESIONAL; INTRAMUSCULAR; INTRAVENOUS; SOFT TISSUE
Status: DISCONTINUED | OUTPATIENT
Start: 2021-11-05 | End: 2021-11-05

## 2021-11-05 RX ORDER — HYDROMORPHONE HYDROCHLORIDE 1 MG/ML
0.2 INJECTION, SOLUTION INTRAMUSCULAR; INTRAVENOUS; SUBCUTANEOUS EVERY 5 MIN PRN
Status: DISCONTINUED | OUTPATIENT
Start: 2021-11-05 | End: 2021-11-05 | Stop reason: HOSPADM

## 2021-11-05 RX ORDER — PHENYLEPHRINE HCL IN 0.9% NACL 1 MG/10 ML
SYRINGE (ML) INTRAVENOUS
Status: DISCONTINUED | OUTPATIENT
Start: 2021-11-05 | End: 2021-11-05

## 2021-11-05 RX ORDER — ONDANSETRON 4 MG/1
4 TABLET, ORALLY DISINTEGRATING ORAL EVERY 6 HOURS PRN
Qty: 10 TABLET | Refills: 0 | Status: SHIPPED | OUTPATIENT
Start: 2021-11-05 | End: 2021-12-17

## 2021-11-05 RX ORDER — EPINEPHRINE CONVENIENCE KIT 1 MG/ML(1)
KIT INTRAMUSCULAR; SUBCUTANEOUS
Status: DISCONTINUED | OUTPATIENT
Start: 2021-11-05 | End: 2021-11-05 | Stop reason: HOSPADM

## 2021-11-05 RX ORDER — ONDANSETRON 2 MG/ML
INJECTION INTRAMUSCULAR; INTRAVENOUS
Status: DISCONTINUED | OUTPATIENT
Start: 2021-11-05 | End: 2021-11-05

## 2021-11-05 RX ORDER — PROPOFOL 10 MG/ML
VIAL (ML) INTRAVENOUS
Status: DISCONTINUED | OUTPATIENT
Start: 2021-11-05 | End: 2021-11-05

## 2021-11-05 RX ORDER — ROCURONIUM BROMIDE 10 MG/ML
INJECTION, SOLUTION INTRAVENOUS
Status: DISCONTINUED | OUTPATIENT
Start: 2021-11-05 | End: 2021-11-05

## 2021-11-05 RX ORDER — SODIUM CHLORIDE 0.9 % (FLUSH) 0.9 %
3 SYRINGE (ML) INJECTION
Status: DISCONTINUED | OUTPATIENT
Start: 2021-11-05 | End: 2021-11-05 | Stop reason: HOSPADM

## 2021-11-05 RX ORDER — NEOSTIGMINE METHYLSULFATE 0.5 MG/ML
INJECTION, SOLUTION INTRAVENOUS
Status: DISCONTINUED | OUTPATIENT
Start: 2021-11-05 | End: 2021-11-05

## 2021-11-05 RX ORDER — FENTANYL CITRATE 50 UG/ML
INJECTION, SOLUTION INTRAMUSCULAR; INTRAVENOUS
Status: DISCONTINUED | OUTPATIENT
Start: 2021-11-05 | End: 2021-11-05

## 2021-11-05 RX ORDER — LIDOCAINE HYDROCHLORIDE AND EPINEPHRINE 10; 10 MG/ML; UG/ML
INJECTION, SOLUTION INFILTRATION; PERINEURAL
Status: DISCONTINUED | OUTPATIENT
Start: 2021-11-05 | End: 2021-11-05 | Stop reason: HOSPADM

## 2021-11-05 RX ORDER — IBUPROFEN 800 MG/1
800 TABLET ORAL EVERY 6 HOURS PRN
Qty: 30 TABLET | Refills: 2 | Status: SHIPPED | OUTPATIENT
Start: 2021-11-05 | End: 2021-12-17

## 2021-11-05 RX ORDER — MIDAZOLAM HYDROCHLORIDE 1 MG/ML
INJECTION, SOLUTION INTRAMUSCULAR; INTRAVENOUS
Status: DISCONTINUED | OUTPATIENT
Start: 2021-11-05 | End: 2021-11-05

## 2021-11-05 RX ORDER — ACETAMINOPHEN 325 MG/1
650 TABLET ORAL EVERY 4 HOURS PRN
Status: DISCONTINUED | OUTPATIENT
Start: 2021-11-05 | End: 2021-11-05 | Stop reason: HOSPADM

## 2021-11-05 RX ORDER — DEXAMETHASONE SODIUM PHOSPHATE 4 MG/ML
8 INJECTION, SOLUTION INTRA-ARTICULAR; INTRALESIONAL; INTRAMUSCULAR; INTRAVENOUS; SOFT TISSUE
Status: CANCELLED | OUTPATIENT
Start: 2021-11-05

## 2021-11-05 RX ORDER — LIDOCAINE HYDROCHLORIDE 10 MG/ML
1 INJECTION, SOLUTION EPIDURAL; INFILTRATION; INTRACAUDAL; PERINEURAL ONCE
Status: DISCONTINUED | OUTPATIENT
Start: 2021-11-05 | End: 2021-11-05 | Stop reason: HOSPADM

## 2021-11-05 RX ORDER — LIDOCAINE HYDROCHLORIDE 20 MG/ML
INJECTION, SOLUTION EPIDURAL; INFILTRATION; INTRACAUDAL; PERINEURAL
Status: DISCONTINUED | OUTPATIENT
Start: 2021-11-05 | End: 2021-11-05

## 2021-11-05 RX ADMIN — Medication 150 MCG: at 07:11

## 2021-11-05 RX ADMIN — Medication 200 MCG: at 08:11

## 2021-11-05 RX ADMIN — NEOSTIGMINE METHYLSULFATE 5 MG: 0.5 INJECTION INTRAVENOUS at 08:11

## 2021-11-05 RX ADMIN — PROPOFOL 150 MG: 10 INJECTION, EMULSION INTRAVENOUS at 07:11

## 2021-11-05 RX ADMIN — MIDAZOLAM 2 MG: 1 INJECTION INTRAMUSCULAR; INTRAVENOUS at 07:11

## 2021-11-05 RX ADMIN — DEXAMETHASONE SODIUM PHOSPHATE 8 MG: 4 INJECTION INTRA-ARTICULAR; INTRALESIONAL; INTRAMUSCULAR; INTRAVENOUS; SOFT TISSUE at 07:11

## 2021-11-05 RX ADMIN — CEFAZOLIN 2 G: 330 INJECTION, POWDER, FOR SOLUTION INTRAMUSCULAR; INTRAVENOUS at 07:11

## 2021-11-05 RX ADMIN — Medication 100 MCG: at 08:11

## 2021-11-05 RX ADMIN — LIDOCAINE HYDROCHLORIDE 100 MG: 20 INJECTION, SOLUTION EPIDURAL; INFILTRATION; INTRACAUDAL at 07:11

## 2021-11-05 RX ADMIN — ROCURONIUM BROMIDE 40 MG: 10 INJECTION, SOLUTION INTRAVENOUS at 07:11

## 2021-11-05 RX ADMIN — ONDANSETRON 4 MG: 2 INJECTION INTRAMUSCULAR; INTRAVENOUS at 08:11

## 2021-11-05 RX ADMIN — SODIUM CHLORIDE: 0.9 INJECTION, SOLUTION INTRAVENOUS at 07:11

## 2021-11-05 RX ADMIN — FENTANYL CITRATE 50 MCG: 50 INJECTION INTRAMUSCULAR; INTRAVENOUS at 07:11

## 2021-11-05 RX ADMIN — ROCURONIUM BROMIDE 5 MG: 10 INJECTION, SOLUTION INTRAVENOUS at 08:11

## 2021-11-05 RX ADMIN — GLYCOPYRROLATE 0.6 MG: 0.2 INJECTION, SOLUTION INTRAMUSCULAR; INTRAVITREAL at 08:11

## 2021-11-08 ENCOUNTER — TELEPHONE (OUTPATIENT)
Dept: GASTROENTEROLOGY | Facility: CLINIC | Age: 66
End: 2021-11-08
Payer: MEDICARE

## 2021-11-10 ENCOUNTER — OFFICE VISIT (OUTPATIENT)
Dept: OTOLARYNGOLOGY | Facility: CLINIC | Age: 66
End: 2021-11-10
Payer: MEDICARE

## 2021-11-10 VITALS — BODY MASS INDEX: 29.02 KG/M2 | HEIGHT: 64 IN | WEIGHT: 170 LBS

## 2021-11-10 DIAGNOSIS — J34.3 NASAL TURBINATE HYPERTROPHY: ICD-10-CM

## 2021-11-10 DIAGNOSIS — J34.2 DEVIATED NASAL SEPTUM: Primary | ICD-10-CM

## 2021-11-10 PROCEDURE — 99024 PR POST-OP FOLLOW-UP VISIT: ICD-10-PCS | Mod: POP,,, | Performed by: OTOLARYNGOLOGY

## 2021-11-10 PROCEDURE — 99999 PR PBB SHADOW E&M-EST. PATIENT-LVL III: ICD-10-PCS | Mod: PBBFAC,,, | Performed by: OTOLARYNGOLOGY

## 2021-11-10 PROCEDURE — 99213 OFFICE O/P EST LOW 20 MIN: CPT | Mod: PBBFAC | Performed by: OTOLARYNGOLOGY

## 2021-11-10 PROCEDURE — 99024 POSTOP FOLLOW-UP VISIT: CPT | Mod: POP,,, | Performed by: OTOLARYNGOLOGY

## 2021-11-10 PROCEDURE — 99999 PR PBB SHADOW E&M-EST. PATIENT-LVL III: CPT | Mod: PBBFAC,,, | Performed by: OTOLARYNGOLOGY

## 2021-12-08 ENCOUNTER — OFFICE VISIT (OUTPATIENT)
Dept: OTOLARYNGOLOGY | Facility: CLINIC | Age: 66
End: 2021-12-08
Payer: MEDICARE

## 2021-12-08 VITALS — HEIGHT: 64 IN | BODY MASS INDEX: 29.89 KG/M2 | WEIGHT: 175.06 LBS

## 2021-12-08 DIAGNOSIS — J01.90 ACUTE SINUSITIS, RECURRENCE NOT SPECIFIED, UNSPECIFIED LOCATION: Primary | ICD-10-CM

## 2021-12-08 DIAGNOSIS — J34.3 NASAL TURBINATE HYPERTROPHY: ICD-10-CM

## 2021-12-08 DIAGNOSIS — J34.2 DEVIATED NASAL SEPTUM: ICD-10-CM

## 2021-12-08 PROCEDURE — 99024 PR POST-OP FOLLOW-UP VISIT: ICD-10-PCS | Mod: POP,,, | Performed by: OTOLARYNGOLOGY

## 2021-12-08 PROCEDURE — 99999 PR PBB SHADOW E&M-EST. PATIENT-LVL III: ICD-10-PCS | Mod: PBBFAC,,, | Performed by: OTOLARYNGOLOGY

## 2021-12-08 PROCEDURE — 99213 OFFICE O/P EST LOW 20 MIN: CPT | Mod: PBBFAC | Performed by: OTOLARYNGOLOGY

## 2021-12-08 PROCEDURE — 99024 POSTOP FOLLOW-UP VISIT: CPT | Mod: POP,,, | Performed by: OTOLARYNGOLOGY

## 2021-12-08 PROCEDURE — 99999 PR PBB SHADOW E&M-EST. PATIENT-LVL III: CPT | Mod: PBBFAC,,, | Performed by: OTOLARYNGOLOGY

## 2021-12-17 ENCOUNTER — OFFICE VISIT (OUTPATIENT)
Dept: FAMILY MEDICINE | Facility: CLINIC | Age: 66
End: 2021-12-17
Payer: MEDICARE

## 2021-12-17 ENCOUNTER — LAB VISIT (OUTPATIENT)
Dept: LAB | Facility: HOSPITAL | Age: 66
End: 2021-12-17
Attending: FAMILY MEDICINE
Payer: MEDICARE

## 2021-12-17 VITALS
SYSTOLIC BLOOD PRESSURE: 120 MMHG | BODY MASS INDEX: 29.35 KG/M2 | OXYGEN SATURATION: 98 % | HEIGHT: 64 IN | HEART RATE: 83 BPM | DIASTOLIC BLOOD PRESSURE: 68 MMHG | WEIGHT: 171.94 LBS

## 2021-12-17 DIAGNOSIS — T70.20XS EFFECTS OF HIGH ALTITUDE, SEQUELA: ICD-10-CM

## 2021-12-17 DIAGNOSIS — B00.9 HERPES: ICD-10-CM

## 2021-12-17 DIAGNOSIS — E78.2 MIXED HYPERLIPIDEMIA: Primary | ICD-10-CM

## 2021-12-17 DIAGNOSIS — E78.2 MIXED HYPERLIPIDEMIA: ICD-10-CM

## 2021-12-17 DIAGNOSIS — N95.1 MENOPAUSE SYNDROME: ICD-10-CM

## 2021-12-17 DIAGNOSIS — Z12.11 SCREEN FOR COLON CANCER: ICD-10-CM

## 2021-12-17 DIAGNOSIS — J32.9 CHRONIC SINUSITIS, UNSPECIFIED LOCATION: ICD-10-CM

## 2021-12-17 LAB
ALBUMIN SERPL BCP-MCNC: 3.9 G/DL (ref 3.5–5.2)
ALP SERPL-CCNC: 70 U/L (ref 55–135)
ALT SERPL W/O P-5'-P-CCNC: 18 U/L (ref 10–44)
ANION GAP SERPL CALC-SCNC: 12 MMOL/L (ref 8–16)
AST SERPL-CCNC: 19 U/L (ref 10–40)
BASOPHILS # BLD AUTO: 0.03 K/UL (ref 0–0.2)
BASOPHILS NFR BLD: 0.6 % (ref 0–1.9)
BILIRUB SERPL-MCNC: 0.3 MG/DL (ref 0.1–1)
BUN SERPL-MCNC: 13 MG/DL (ref 8–23)
CALCIUM SERPL-MCNC: 9 MG/DL (ref 8.7–10.5)
CHLORIDE SERPL-SCNC: 105 MMOL/L (ref 95–110)
CHOLEST SERPL-MCNC: 168 MG/DL (ref 120–199)
CHOLEST/HDLC SERPL: 2.4 {RATIO} (ref 2–5)
CO2 SERPL-SCNC: 23 MMOL/L (ref 23–29)
CREAT SERPL-MCNC: 0.7 MG/DL (ref 0.5–1.4)
DIFFERENTIAL METHOD: ABNORMAL
EOSINOPHIL # BLD AUTO: 0.1 K/UL (ref 0–0.5)
EOSINOPHIL NFR BLD: 2.7 % (ref 0–8)
ERYTHROCYTE [DISTWIDTH] IN BLOOD BY AUTOMATED COUNT: 13.3 % (ref 11.5–14.5)
EST. GFR  (AFRICAN AMERICAN): >60 ML/MIN/1.73 M^2
EST. GFR  (NON AFRICAN AMERICAN): >60 ML/MIN/1.73 M^2
GLUCOSE SERPL-MCNC: 100 MG/DL (ref 70–110)
HCT VFR BLD AUTO: 43.5 % (ref 37–48.5)
HDLC SERPL-MCNC: 70 MG/DL (ref 40–75)
HDLC SERPL: 41.7 % (ref 20–50)
HGB BLD-MCNC: 13.5 G/DL (ref 12–16)
IMM GRANULOCYTES # BLD AUTO: 0.01 K/UL (ref 0–0.04)
IMM GRANULOCYTES NFR BLD AUTO: 0.2 % (ref 0–0.5)
LDLC SERPL CALC-MCNC: 78.4 MG/DL (ref 63–159)
LYMPHOCYTES # BLD AUTO: 1.2 K/UL (ref 1–4.8)
LYMPHOCYTES NFR BLD: 23.2 % (ref 18–48)
MCH RBC QN AUTO: 29.3 PG (ref 27–31)
MCHC RBC AUTO-ENTMCNC: 31 G/DL (ref 32–36)
MCV RBC AUTO: 95 FL (ref 82–98)
MONOCYTES # BLD AUTO: 0.5 K/UL (ref 0.3–1)
MONOCYTES NFR BLD: 9.3 % (ref 4–15)
NEUTROPHILS # BLD AUTO: 3.4 K/UL (ref 1.8–7.7)
NEUTROPHILS NFR BLD: 64 % (ref 38–73)
NONHDLC SERPL-MCNC: 98 MG/DL
NRBC BLD-RTO: 0 /100 WBC
PLATELET # BLD AUTO: 326 K/UL (ref 150–450)
PMV BLD AUTO: 11.2 FL (ref 9.2–12.9)
POTASSIUM SERPL-SCNC: 4.1 MMOL/L (ref 3.5–5.1)
PROT SERPL-MCNC: 7.1 G/DL (ref 6–8.4)
RBC # BLD AUTO: 4.6 M/UL (ref 4–5.4)
SODIUM SERPL-SCNC: 140 MMOL/L (ref 136–145)
TRIGL SERPL-MCNC: 98 MG/DL (ref 30–150)
WBC # BLD AUTO: 5.26 K/UL (ref 3.9–12.7)

## 2021-12-17 PROCEDURE — 85025 COMPLETE CBC W/AUTO DIFF WBC: CPT | Performed by: FAMILY MEDICINE

## 2021-12-17 PROCEDURE — 99214 PR OFFICE/OUTPT VISIT, EST, LEVL IV, 30-39 MIN: ICD-10-PCS | Mod: S$PBB,,, | Performed by: FAMILY MEDICINE

## 2021-12-17 PROCEDURE — 99213 OFFICE O/P EST LOW 20 MIN: CPT | Mod: PBBFAC,PO | Performed by: FAMILY MEDICINE

## 2021-12-17 PROCEDURE — 80061 LIPID PANEL: CPT | Performed by: FAMILY MEDICINE

## 2021-12-17 PROCEDURE — 36415 COLL VENOUS BLD VENIPUNCTURE: CPT | Mod: PO | Performed by: FAMILY MEDICINE

## 2021-12-17 PROCEDURE — 99999 PR PBB SHADOW E&M-EST. PATIENT-LVL III: CPT | Mod: PBBFAC,,, | Performed by: FAMILY MEDICINE

## 2021-12-17 PROCEDURE — 99999 PR PBB SHADOW E&M-EST. PATIENT-LVL III: ICD-10-PCS | Mod: PBBFAC,,, | Performed by: FAMILY MEDICINE

## 2021-12-17 PROCEDURE — 99214 OFFICE O/P EST MOD 30 MIN: CPT | Mod: S$PBB,,, | Performed by: FAMILY MEDICINE

## 2021-12-17 PROCEDURE — 80053 COMPREHEN METABOLIC PANEL: CPT | Performed by: FAMILY MEDICINE

## 2021-12-17 RX ORDER — ACETAZOLAMIDE 125 MG/1
125 TABLET ORAL 2 TIMES DAILY
Qty: 18 TABLET | Refills: 6 | Status: SHIPPED | OUTPATIENT
Start: 2021-12-17 | End: 2022-11-22

## 2021-12-17 RX ORDER — RANITIDINE HCL 150 MG
1 TABLET ORAL 2 TIMES DAILY
Qty: 710 ML | Refills: 3 | Status: SHIPPED | OUTPATIENT
Start: 2021-12-17

## 2021-12-17 RX ORDER — ACYCLOVIR 200 MG/1
200 CAPSULE ORAL 3 TIMES DAILY
Qty: 15 CAPSULE | Refills: 3 | Status: SHIPPED | OUTPATIENT
Start: 2021-12-17 | End: 2023-02-02

## 2021-12-27 ENCOUNTER — TELEPHONE (OUTPATIENT)
Dept: ENDOSCOPY | Facility: HOSPITAL | Age: 66
End: 2021-12-27
Payer: MEDICARE

## 2021-12-27 RX ORDER — SODIUM, POTASSIUM,MAG SULFATES 17.5-3.13G
1 SOLUTION, RECONSTITUTED, ORAL ORAL DAILY
Qty: 1 KIT | Refills: 0 | Status: SHIPPED | OUTPATIENT
Start: 2021-12-27 | End: 2021-12-29

## 2022-02-24 DIAGNOSIS — Z01.818 PRE-OP TESTING: ICD-10-CM

## 2022-03-13 DIAGNOSIS — F41.9 ANXIETY: ICD-10-CM

## 2022-03-13 DIAGNOSIS — F33.1 MODERATE EPISODE OF RECURRENT MAJOR DEPRESSIVE DISORDER: ICD-10-CM

## 2022-03-13 NOTE — TELEPHONE ENCOUNTER
No new care gaps identified.  Powered by [employer]. Reference number: [id number].   3/13/2022 1:55:14 PM CDT

## 2022-03-14 DIAGNOSIS — E78.5 DYSLIPIDEMIA: ICD-10-CM

## 2022-03-14 NOTE — TELEPHONE ENCOUNTER
No new care gaps identified.  Powered by [employer]. Reference number: [id number].   3/14/2022 8:07:40 AM CDT

## 2022-03-21 RX ORDER — VENLAFAXINE HYDROCHLORIDE 150 MG/1
CAPSULE, EXTENDED RELEASE ORAL
Qty: 90 CAPSULE | Refills: 2 | Status: SHIPPED | OUTPATIENT
Start: 2022-03-21 | End: 2022-09-30

## 2022-03-22 RX ORDER — ATORVASTATIN CALCIUM 10 MG/1
TABLET, FILM COATED ORAL
Qty: 90 TABLET | Refills: 2 | Status: SHIPPED | OUTPATIENT
Start: 2022-03-22 | End: 2023-04-04

## 2022-03-22 NOTE — TELEPHONE ENCOUNTER
Refill Authorization Note   Leticia Brown  is requesting a refill authorization.  Brief Assessment and Rationale for Refill:  Approve     Medication Therapy Plan:       Medication Reconciliation Completed: No   Comments:   --->Care Gap information included below if applicable.   Orders Placed This Encounter    atorvastatin (LIPITOR) 10 MG tablet      Requested Prescriptions   Signed Prescriptions Disp Refills    atorvastatin (LIPITOR) 10 MG tablet 90 tablet 2     Sig: TAKE 1 TABLET(10 MG) BY MOUTH EVERY EVENING       Cardiovascular:  Antilipid - Statins Passed - 3/14/2022  8:07 AM        Passed - Patient is at least 18 years old        Passed - Valid encounter within last 15 months     Recent Visits  Date Type Provider Dept   12/17/21 Office Visit Timo Pike MD [facility]   06/15/21 Office Visit Timo Pike MD [facility]   Showing recent visits within past 720 days and meeting all other requirements  Future Appointments  No visits were found meeting these conditions.  Showing future appointments within next 150 days and meeting all other requirements      Future Appointments              In 3 months Timo Pike MD [facility]                Passed - ALT is 131 or below and within 360 days     ALT   Date Value Ref Range Status   12/17/2021 18 10 - 44 U/L Final   06/15/2021 19 10 - 44 U/L Final   05/28/2019 18 10 - 44 U/L Final              Passed - AST is 119 or below and within 360 days     AST   Date Value Ref Range Status   12/17/2021 19 10 - 40 U/L Final   06/15/2021 23 10 - 40 U/L Final   05/28/2019 21 10 - 40 U/L Final              Passed - Total Cholesterol within 360 days     Lab Results   Component Value Date    CHOL 168 12/17/2021    CHOL 244 (H) 06/15/2021    CHOL 216 (H) 05/28/2019              Passed - LDL within 360 days     LDL Cholesterol   Date Value Ref Range Status   12/17/2021 78.4 63.0 - 159.0 mg/dL Final      Comment:     The National Cholesterol Education Program (NCEP) has set the  following guidelines (reference values) for LDL Cholesterol:  Optimal.......................<130 mg/dL  Borderline High...............130-159 mg/dL  High..........................160-189 mg/dL  Very High.....................>190 mg/dL              Passed - HDL within 360 days     HDL   Date Value Ref Range Status   12/17/2021 70 40 - 75 mg/dL Final     Comment:     The National Cholesterol Education Program (NCEP) has set the  following guidelines (reference values) for HDL Cholesterol:  Low...............<40 mg/dL  Optimal...........>60 mg/dL              Passed - Triglycerides within 360 days     Lab Results   Component Value Date    TRIG 98 12/17/2021    TRIG 75 06/15/2021    TRIG 91 05/28/2019                  Appointments  past 12m or future 3m with PCP    Date Provider   Last Visit   12/17/2021 Timo Pike MD   Next Visit   6/20/2022 Timo Pike MD   ED visits in past 90 days: 0     Note composed:7:20 AM 03/22/2022

## 2022-03-22 NOTE — TELEPHONE ENCOUNTER
Refill Authorization Note   Leticia Brown  is requesting a refill authorization.  Brief Assessment and Rationale for Refill:  Approve     Medication Therapy Plan:       Medication Reconciliation Completed: No   Comments:   --->Care Gap information included below if applicable.   Orders Placed This Encounter    venlafaxine (EFFEXOR-XR) 150 MG Cp24      Requested Prescriptions   Signed Prescriptions Disp Refills    venlafaxine (EFFEXOR-XR) 150 MG Cp24 90 capsule 2     Sig: TAKE 1 CAPSULE(150 MG) BY MOUTH EVERY DAY       Psychiatry: Antidepressants - SNRI - desvenlafaxine & venlafaxine Passed - 3/21/2022  9:55 PM        Passed - Patient is at least 18 years old        Passed - Last BP in normal range within 360 days     BP Readings from Last 3 Encounters:   12/17/21 120/68   11/05/21 (!) 116/56   10/19/21 129/72               Passed - Valid encounter within last 15 months     Recent Visits  Date Type Provider Dept   12/17/21 Office Visit Timo Pike MD [facility]   06/15/21 Office Visit Timo Pike MD [facility]   Showing recent visits within past 720 days and meeting all other requirements  Future Appointments  No visits were found meeting these conditions.  Showing future appointments within next 150 days and meeting all other requirements      Future Appointments              In 3 months Timo Pike MD [facility]                Passed - Cr is 1.39 or below and within 360 days     Lab Results   Component Value Date    CREATININE 0.7 12/17/2021    CREATININE 0.8 06/15/2021    CREATININE 0.8 05/28/2019              Passed - eGFR within 360 days     Lab Results   Component Value Date    EGFRNONAA >60.0 12/17/2021    EGFRNONAA >60.0 06/15/2021    EGFRNONAA >60.0 05/28/2019                    Appointments  past 12m or future 3m with PCP    Date Provider   Last Visit   12/17/2021 Timo Pike MD   Next Visit   3/14/2022 Timo GREEN  MD [facility]   ED visits in past 90 days: 0     Note composed:9:55 PM 03/21/2022

## 2022-03-23 ENCOUNTER — TELEPHONE (OUTPATIENT)
Dept: ENDOSCOPY | Facility: HOSPITAL | Age: 67
End: 2022-03-23
Payer: MEDICARE

## 2022-03-23 NOTE — TELEPHONE ENCOUNTER
Spoke with patient about arrival time @ 0800 .   Covid test = Vaccinated    Prep instructions reviewed: the day before the procedure, follow a clear liquid diet all day, then start the first 1/2 of prep at 5pm and take 2nd 1/2 of prep @ 0300  Pt must be completely NPO when prep completed @ 0500          Medications: Do not take Insulin or oral diabetic medications the day of the procedure.  Take as prescribed: heart, seizure and blood pressure medication in the morning with a sip of water (less than an ounce).  Take any breathing medications and bring inhalers to hospital with you Leave all valuables and jewelry at home.     Wear comfortable clothes to procedure to change into hospital gown You cannot drive for 24 hours after your procedure because you will receive sedation for your procedure to make you comfortable.  A ride must be provided at discharge.

## 2022-03-25 ENCOUNTER — ANESTHESIA EVENT (OUTPATIENT)
Dept: ENDOSCOPY | Facility: HOSPITAL | Age: 67
End: 2022-03-25
Payer: MEDICARE

## 2022-03-25 ENCOUNTER — HOSPITAL ENCOUNTER (OUTPATIENT)
Facility: HOSPITAL | Age: 67
Discharge: HOME | End: 2022-03-25
Attending: INTERNAL MEDICINE | Admitting: INTERNAL MEDICINE
Payer: MEDICARE

## 2022-03-25 ENCOUNTER — ANESTHESIA (OUTPATIENT)
Dept: ENDOSCOPY | Facility: HOSPITAL | Age: 67
End: 2022-03-25
Payer: MEDICARE

## 2022-03-25 VITALS
TEMPERATURE: 98 F | BODY MASS INDEX: 29.88 KG/M2 | HEIGHT: 64 IN | DIASTOLIC BLOOD PRESSURE: 48 MMHG | WEIGHT: 175 LBS | HEART RATE: 66 BPM | SYSTOLIC BLOOD PRESSURE: 102 MMHG | RESPIRATION RATE: 14 BRPM | OXYGEN SATURATION: 100 %

## 2022-03-25 DIAGNOSIS — Z12.11 COLON CANCER SCREENING: ICD-10-CM

## 2022-03-25 PROCEDURE — 37000009 HC ANESTHESIA EA ADD 15 MINS: Performed by: INTERNAL MEDICINE

## 2022-03-25 PROCEDURE — 37000008 HC ANESTHESIA 1ST 15 MINUTES: Performed by: INTERNAL MEDICINE

## 2022-03-25 PROCEDURE — G0121 COLON CA SCRN NOT HI RSK IND: HCPCS | Performed by: INTERNAL MEDICINE

## 2022-03-25 PROCEDURE — 63600175 PHARM REV CODE 636 W HCPCS: Performed by: NURSE ANESTHETIST, CERTIFIED REGISTERED

## 2022-03-25 PROCEDURE — G0121 COLON CA SCRN NOT HI RSK IND: HCPCS | Mod: NSCH,,, | Performed by: INTERNAL MEDICINE

## 2022-03-25 PROCEDURE — 25000003 PHARM REV CODE 250: Performed by: INTERNAL MEDICINE

## 2022-03-25 PROCEDURE — G0121 COLON CA SCRN NOT HI RSK IND: ICD-10-PCS | Mod: NSCH,,, | Performed by: INTERNAL MEDICINE

## 2022-03-25 PROCEDURE — 25000003 PHARM REV CODE 250: Performed by: NURSE ANESTHETIST, CERTIFIED REGISTERED

## 2022-03-25 RX ORDER — LIDOCAINE HYDROCHLORIDE 20 MG/ML
INJECTION, SOLUTION EPIDURAL; INFILTRATION; INTRACAUDAL; PERINEURAL
Status: DISCONTINUED | OUTPATIENT
Start: 2022-03-25 | End: 2022-03-25

## 2022-03-25 RX ORDER — SODIUM CHLORIDE 0.9 % (FLUSH) 0.9 %
3 SYRINGE (ML) INJECTION
Status: DISCONTINUED | OUTPATIENT
Start: 2022-03-25 | End: 2022-03-25 | Stop reason: HOSPADM

## 2022-03-25 RX ORDER — SODIUM CHLORIDE 9 MG/ML
INJECTION, SOLUTION INTRAVENOUS CONTINUOUS
Status: DISCONTINUED | OUTPATIENT
Start: 2022-03-25 | End: 2022-03-25 | Stop reason: HOSPADM

## 2022-03-25 RX ORDER — PROPOFOL 10 MG/ML
VIAL (ML) INTRAVENOUS CONTINUOUS PRN
Status: DISCONTINUED | OUTPATIENT
Start: 2022-03-25 | End: 2022-03-25

## 2022-03-25 RX ORDER — PROPOFOL 10 MG/ML
VIAL (ML) INTRAVENOUS
Status: DISCONTINUED | OUTPATIENT
Start: 2022-03-25 | End: 2022-03-25

## 2022-03-25 RX ADMIN — PROPOFOL 150 MCG/KG/MIN: 10 INJECTION, EMULSION INTRAVENOUS at 08:03

## 2022-03-25 RX ADMIN — PROPOFOL 60 MG: 10 INJECTION, EMULSION INTRAVENOUS at 08:03

## 2022-03-25 RX ADMIN — SODIUM CHLORIDE: 0.9 INJECTION, SOLUTION INTRAVENOUS at 08:03

## 2022-03-25 RX ADMIN — LIDOCAINE HYDROCHLORIDE 50 MG: 20 INJECTION, SOLUTION EPIDURAL; INFILTRATION; INTRACAUDAL; PERINEURAL at 08:03

## 2022-03-25 NOTE — TRANSFER OF CARE
"Anesthesia Transfer of Care Note    Patient: Leticia Brown    Procedure(s) Performed: Procedure(s) (LRB):  COLONOSCOPY  Covid Test Rapid (N/A)    Patient location: GI    Anesthesia Type: MAC    Transport from OR: Transported from OR on room air with adequate spontaneous ventilation    Post pain: adequate analgesia    Post assessment: no apparent anesthetic complications and tolerated procedure well    Post vital signs: stable    Level of consciousness: awake and alert    Nausea/Vomiting: no nausea/vomiting    Complications: none    Transfer of care protocol was followed      Last vitals:   Visit Vitals  BP (!) 122/58 (BP Location: Left arm, Patient Position: Lying)   Pulse 72   Temp 36.8 °C (98.2 °F) (Temporal)   Resp 18   Ht 5' 4" (1.626 m)   Wt 79.4 kg (175 lb)   LMP  (LMP Unknown)   SpO2 95%   Breastfeeding No   BMI 30.04 kg/m²     "

## 2022-03-25 NOTE — H&P
Short Stay Endoscopy History and Physical    PCP - Timo Pike MD    Procedure - Colonoscopy  ASA - III  Mallampati - per anesthesia  History of Anesthesia problems - no  Family history Anesthesia problems - no     HPI:  This is a 66 y.o. female here for evaluation of : Colon cancer screening    Family history of colon cancer - no  History of polyps - na  Change in bowel habits - no  Rectal bleeding - no      ROS:  Constitutional: No fevers, chills, No weight loss  ENT: No allergies  CV: No chest pain  Pulm: No shortness of breath  GI: see HPI  Derm: No rash    Medical History:  has a past medical history of Cancer.    Surgical History:  has a past surgical history that includes Tonsillectomy; Adenoidectomy; Esophagus surgery; Colonoscopy (2005); Tympanoplasty; Bunionectomy; back sx; Nasal septoplasty (N/A, 11/5/2021); and Nasal turbinate reduction (Bilateral, 11/5/2021).    Family History: family history includes Breast cancer in her maternal cousin; Stroke in her mother.. Otherwise no colon cancer, inflammatory bowel disease, or GI malignancies.    Social History:  reports that she has never smoked. She has never used smokeless tobacco. She reports that she does not drink alcohol and does not use drugs.    Review of patient's allergies indicates:   Allergen Reactions    Sulfa (sulfonamide antibiotics) Hives       Medications:   Medications Prior to Admission   Medication Sig Dispense Refill Last Dose    acetaZOLAMIDE (DIAMOX) 125 MG Tab Take 1 tablet (125 mg total) by mouth 2 (two) times daily. for 9 days 18 tablet 6     acyclovir (ZOVIRAX) 200 MG capsule Take 1 capsule (200 mg total) by mouth 3 (three) times daily. for 5 days 15 capsule 3     atorvastatin (LIPITOR) 10 MG tablet TAKE 1 TABLET(10 MG) BY MOUTH EVERY EVENING 90 tablet 2     estradioL (ESTRACE) 1 MG tablet TAKE 1 TABLET(1 MG) BY MOUTH EVERY DAY 90 tablet 3     soft lens rinse,store solution (SALINE SOLUTION) Soln 1 application by  Misc.(Non-Drug; Combo Route) route 2 (two) times a day. 710 mL 3     venlafaxine (EFFEXOR-XR) 150 MG Cp24 TAKE 1 CAPSULE(150 MG) BY MOUTH EVERY DAY 90 capsule 2          Objective Findings:    Vital Signs: see nursing notes  Physical Exam:  General Appearance: Well appearing in no acute distress  Eyes:    No scleral icterus  ENT: Neck supple  Lungs: CTA anteriorly  Heart:  S1, S2 normal, no murmurs heard  Abdomen: Soft, non tender, non distended with positive bowel sounds. No hepatosplenomegaly, ascites, or mass  Extremities: no edema  Skin: No rash      Labs:  Lab Results   Component Value Date    WBC 5.26 12/17/2021    HGB 13.5 12/17/2021    HCT 43.5 12/17/2021     12/17/2021    CHOL 168 12/17/2021    TRIG 98 12/17/2021    HDL 70 12/17/2021    ALT 18 12/17/2021    AST 19 12/17/2021     12/17/2021    K 4.1 12/17/2021     12/17/2021    CREATININE 0.7 12/17/2021    BUN 13 12/17/2021    CO2 23 12/17/2021    TSH 1.090 06/15/2021    INR 0.9 02/22/2012    HGBA1C 5.0 08/01/2014       I have explained the risks and benefits of endoscopy procedures to the patient including but not limited to bleeding, perforation, infection, and death.    Marshall King MD

## 2022-03-25 NOTE — ANESTHESIA POSTPROCEDURE EVALUATION
Anesthesia Post Evaluation    Patient: Leticia Brown    Procedure(s) Performed: Procedure(s) (LRB):  COLONOSCOPY  Covid Test Rapid (N/A)    Final Anesthesia Type: MAC      Patient location during evaluation: GI PACU  Patient participation: Yes- Able to Participate  Level of consciousness: awake and alert  Post-procedure vital signs: reviewed and stable  Pain management: adequate  Airway patency: patent    PONV status at discharge: No PONV  Anesthetic complications: no      Cardiovascular status: blood pressure returned to baseline and hemodynamically stable  Respiratory status: unassisted, spontaneous ventilation and room air  Hydration status: euvolemic  Follow-up not needed.          Vitals Value Taken Time   /73 03/25/22 0918   Temp 97 03/25/22 0918   Pulse 73 03/25/22 0918   Resp 14 03/25/22 0918   SpO2 100 03/25/22 0918         No case tracking events are documented in the log.      Pain/Jaycob Score: Jaycob Score: 9 (3/25/2022  9:17 AM)

## 2022-03-25 NOTE — PROVATION PATIENT INSTRUCTIONS
Discharge Summary/Instructions after an Endoscopic Procedure  Patient Name: Leticia Brown  Patient MRN: [id number]  Patient YOB: 1955 Friday, March 25, 2022  Marshall King MD  Dear patient,  As a result of recent federal legislation (The Federal Cures Act), you may   receive lab or pathology results from your procedure in your [facility]   account before your physician is able to contact you. Your physician or   their representative will relay the results to you with their   recommendations at their soonest availability.  Thank you,  Your health is very important to us during the Covid Crisis. Following your   procedure today, you will receive a daily text for 2 weeks asking about   signs or symptoms of Covid 19.  Please respond to this text when you   receive it so we can follow up and keep you as safe as possible.   RESTRICTIONS:  During your procedure today, you received medications for sedation.  These   medications may affect your judgment, balance and coordination.  Therefore,   for 24 hours, you have the following restrictions:   - DO NOT drive a car, operate machinery, make legal/financial decisions,   sign important papers or drink alcohol.    ACTIVITY:  Today: no heavy lifting, straining or running due to procedural   sedation/anesthesia.  The following day: return to full activity including work.  DIET:  Eat and drink normally unless instructed otherwise.     TREATMENT FOR COMMON SIDE EFFECTS:  - Mild abdominal pain, nausea, belching, bloating or excessive gas:  rest,   eat lightly and use a heating pad.  - Sore Throat: treat with throat lozenges and/or gargle with warm salt   water.  - Because air was used during the procedure, expelling large amounts of air   from your rectum or belching is normal.  - If a bowel prep was taken, you may not have a bowel movement for 1-3 days.    This is normal.  SYMPTOMS TO WATCH FOR AND REPORT TO YOUR PHYSICIAN:  1. Abdominal pain or bloating, other  than gas cramps.  2. Chest pain.  3. Back pain.  4. Signs of infection such as: chills or fever occurring within 24 hours   after the procedure.  5. Rectal bleeding, which would show as bright red, maroon, or black stools.   (A tablespoon of blood from the rectum is not serious, especially if   hemorrhoids are present.)  6. Vomiting.  7. Weakness or dizziness.  GO DIRECTLY TO THE NEAREST EMERGENCY ROOM IF YOU HAVE ANY OF THE FOLLOWING:      Difficulty breathing              Chills and/or fever over 101 F   Persistent vomiting and/or vomiting blood   Severe abdominal pain   Severe chest pain   Black, tarry stools   Bleeding- more than one tablespoon   Any other symptom or condition that you feel may need urgent attention  Your doctor recommends these additional instructions:  If any biopsies were taken, your doctors clinic will contact you in 1 to 2   weeks with any results.  - Discharge patient to home.   - Resume previous diet.   - Continue present medications.   - Repeat colonoscopy in 10 years for screening purposes.   - Patient has a contact number available for emergencies.  The signs and   symptoms of potential delayed complications were discussed with the   patient.  Return to normal activities tomorrow.  Written discharge   instructions were provided to the patient.  For questions, problems or results please call your physician - Marshall King MD.  EMERGENCY PHONE NUMBER: [phone],  LAB RESULTS: [phone]  IF A COMPLICATION OR EMERGENCY SITUATION ARISES AND YOU ARE UNABLE TO REACH   YOUR PHYSICIAN - GO DIRECTLY TO THE EMERGENCY ROOM.  Marshall King MD  3/25/2022 9:19:32 AM  This report has been verified and signed electronically.  Dear patient,  As a result of recent federal legislation (The Federal Cures Act), you may   receive lab or pathology results from your procedure in your [facility]   account before your physician is able to contact you. Your physician or   their  representative will relay the results to you with their   recommendations at their soonest availability.  Thank you,  [employer]ATION

## 2022-03-25 NOTE — ANESTHESIA PREPROCEDURE EVALUATION
03/25/2022  Leticia Brown is a 66 y.o., female for colonoscopy under MAC    Past Medical History:   Diagnosis Date    Cancer      Past Surgical History:   Procedure Laterality Date    ADENOIDECTOMY      back sx      BUNIONECTOMY      COLONOSCOPY  2005    diverticulosis    ESOPHAGUS SURGERY      NASAL SEPTOPLASTY N/A 11/5/2021    Procedure: SEPTOPLASTY, NOSE;  Surgeon: Irving Singh MD;  Location: [facility] OR [address] [place] [address];  Service: ENT;  Laterality: N/A;    NASAL TURBINATE REDUCTION Bilateral 11/5/2021    Procedure: REDUCTION, NASAL TURBINATE;  Surgeon: Irving Singh MD;  Location: [facility] OR [address] [place] [address];  Service: ENT;  Laterality: Bilateral;    TONSILLECTOMY      TYMPANOPLASTY             Pre-op Assessment    I have reviewed the Patient Summary Reports.    I have reviewed the NPO Status.   I have reviewed the Medications.     Review of Systems  Social:  Non-Smoker    Endocrine:  Obesity / BMI > 30      Physical Exam  General: Well nourished    Airway:  Mallampati: II           Anesthesia Plan  Type of Anesthesia, risks & benefits discussed:    Anesthesia Type: MAC  Intra-op Monitoring Plan: Standard ASA Monitors  Informed Consent: Informed consent signed with the Patient and all parties understand the risks and agree with anesthesia plan.  All questions answered.   ASA Score: 2    Ready For Surgery From Anesthesia Perspective.     .

## 2022-03-28 ENCOUNTER — TELEPHONE (OUTPATIENT)
Dept: ENDOSCOPY | Facility: HOSPITAL | Age: 67
End: 2022-03-28
Payer: MEDICARE

## 2022-03-28 NOTE — TELEPHONE ENCOUNTER
Post procedure f/u call, no answer, message left to call [phone] for any post procedure concerns

## 2022-08-03 DIAGNOSIS — Z12.31 OTHER SCREENING MAMMOGRAM: ICD-10-CM

## 2022-08-25 ENCOUNTER — HOSPITAL ENCOUNTER (OUTPATIENT)
Dept: RADIOLOGY | Facility: HOSPITAL | Age: 67
Discharge: HOME | End: 2022-08-25
Attending: FAMILY MEDICINE
Payer: MEDICARE

## 2022-08-25 DIAGNOSIS — Z12.31 OTHER SCREENING MAMMOGRAM: ICD-10-CM

## 2022-08-25 PROCEDURE — 77063 BREAST TOMOSYNTHESIS BI: CPT | Mod: 26,,, | Performed by: RADIOLOGY

## 2022-08-25 PROCEDURE — 77063 BREAST TOMOSYNTHESIS BI: CPT | Mod: TC

## 2022-08-25 PROCEDURE — 77063 MAMMO DIGITAL SCREENING BILAT WITH TOMO: ICD-10-PCS | Mod: 26,,, | Performed by: RADIOLOGY

## 2022-08-25 PROCEDURE — 77067 SCR MAMMO BI INCL CAD: CPT | Mod: 26,,, | Performed by: RADIOLOGY

## 2022-08-25 PROCEDURE — 77067 MAMMO DIGITAL SCREENING BILAT WITH TOMO: ICD-10-PCS | Mod: 26,,, | Performed by: RADIOLOGY

## 2022-09-21 ENCOUNTER — OFFICE VISIT (OUTPATIENT)
Dept: FAMILY MEDICINE | Facility: CLINIC | Age: 67
End: 2022-09-21
Payer: MEDICARE

## 2022-09-21 ENCOUNTER — LAB VISIT (OUTPATIENT)
Dept: LAB | Facility: HOSPITAL | Age: 67
End: 2022-09-21
Attending: FAMILY MEDICINE
Payer: MEDICARE

## 2022-09-21 VITALS
HEIGHT: 64 IN | WEIGHT: 170.19 LBS | HEART RATE: 77 BPM | BODY MASS INDEX: 29.06 KG/M2 | OXYGEN SATURATION: 94 % | DIASTOLIC BLOOD PRESSURE: 70 MMHG | SYSTOLIC BLOOD PRESSURE: 110 MMHG

## 2022-09-21 DIAGNOSIS — E78.5 DYSLIPIDEMIA: ICD-10-CM

## 2022-09-21 DIAGNOSIS — R32 URINARY INCONTINENCE IN FEMALE: Primary | ICD-10-CM

## 2022-09-21 DIAGNOSIS — Z23 NEED FOR STREPTOCOCCUS PNEUMONIAE VACCINATION: ICD-10-CM

## 2022-09-21 LAB
ALBUMIN SERPL BCP-MCNC: 3.9 G/DL (ref 3.5–5.2)
ALP SERPL-CCNC: 78 U/L (ref 55–135)
ALT SERPL W/O P-5'-P-CCNC: 20 U/L (ref 10–44)
ANION GAP SERPL CALC-SCNC: 8 MMOL/L (ref 8–16)
AST SERPL-CCNC: 21 U/L (ref 10–40)
BASOPHILS # BLD AUTO: 0.03 K/UL (ref 0–0.2)
BASOPHILS NFR BLD: 0.6 % (ref 0–1.9)
BILIRUB SERPL-MCNC: 0.3 MG/DL (ref 0.1–1)
BUN SERPL-MCNC: 16 MG/DL (ref 8–23)
CALCIUM SERPL-MCNC: 8.9 MG/DL (ref 8.7–10.5)
CHLORIDE SERPL-SCNC: 105 MMOL/L (ref 95–110)
CHOLEST SERPL-MCNC: 192 MG/DL (ref 120–199)
CHOLEST/HDLC SERPL: 2.4 {RATIO} (ref 2–5)
CO2 SERPL-SCNC: 29 MMOL/L (ref 23–29)
CREAT SERPL-MCNC: 0.8 MG/DL (ref 0.5–1.4)
DIFFERENTIAL METHOD: ABNORMAL
EOSINOPHIL # BLD AUTO: 0.1 K/UL (ref 0–0.5)
EOSINOPHIL NFR BLD: 2 % (ref 0–8)
ERYTHROCYTE [DISTWIDTH] IN BLOOD BY AUTOMATED COUNT: 13.1 % (ref 11.5–14.5)
EST. GFR  (NO RACE VARIABLE): >60 ML/MIN/1.73 M^2
GLUCOSE SERPL-MCNC: 86 MG/DL (ref 70–110)
HCT VFR BLD AUTO: 42.2 % (ref 37–48.5)
HDLC SERPL-MCNC: 79 MG/DL (ref 40–75)
HDLC SERPL: 41.1 % (ref 20–50)
HGB BLD-MCNC: 13 G/DL (ref 12–16)
IMM GRANULOCYTES # BLD AUTO: 0.01 K/UL (ref 0–0.04)
IMM GRANULOCYTES NFR BLD AUTO: 0.2 % (ref 0–0.5)
LDLC SERPL CALC-MCNC: 99 MG/DL (ref 63–159)
LYMPHOCYTES # BLD AUTO: 1.3 K/UL (ref 1–4.8)
LYMPHOCYTES NFR BLD: 26.1 % (ref 18–48)
MCH RBC QN AUTO: 29.4 PG (ref 27–31)
MCHC RBC AUTO-ENTMCNC: 30.8 G/DL (ref 32–36)
MCV RBC AUTO: 96 FL (ref 82–98)
MONOCYTES # BLD AUTO: 0.5 K/UL (ref 0.3–1)
MONOCYTES NFR BLD: 9.4 % (ref 4–15)
NEUTROPHILS # BLD AUTO: 3 K/UL (ref 1.8–7.7)
NEUTROPHILS NFR BLD: 61.7 % (ref 38–73)
NONHDLC SERPL-MCNC: 113 MG/DL
NRBC BLD-RTO: 0 /100 WBC
PLATELET # BLD AUTO: 291 K/UL (ref 150–450)
PMV BLD AUTO: 12 FL (ref 9.2–12.9)
POTASSIUM SERPL-SCNC: 3.7 MMOL/L (ref 3.5–5.1)
PROT SERPL-MCNC: 6.6 G/DL (ref 6–8.4)
RBC # BLD AUTO: 4.42 M/UL (ref 4–5.4)
SODIUM SERPL-SCNC: 142 MMOL/L (ref 136–145)
TRIGL SERPL-MCNC: 70 MG/DL (ref 30–150)
TSH SERPL DL<=0.005 MIU/L-ACNC: 2.17 UIU/ML (ref 0.4–4)
WBC # BLD AUTO: 4.91 K/UL (ref 3.9–12.7)

## 2022-09-21 PROCEDURE — 99215 PR OFFICE/OUTPT VISIT, EST, LEVL V, 40-54 MIN: ICD-10-PCS | Mod: S$PBB,,, | Performed by: FAMILY MEDICINE

## 2022-09-21 PROCEDURE — 99999 PR PBB SHADOW E&M-EST. PATIENT-LVL IV: CPT | Mod: PBBFAC,,, | Performed by: FAMILY MEDICINE

## 2022-09-21 PROCEDURE — 99999 PR PBB SHADOW E&M-EST. PATIENT-LVL IV: ICD-10-PCS | Mod: PBBFAC,,, | Performed by: FAMILY MEDICINE

## 2022-09-21 PROCEDURE — 90677 PCV20 VACCINE IM: CPT | Mod: PBBFAC,PO

## 2022-09-21 PROCEDURE — 84443 ASSAY THYROID STIM HORMONE: CPT | Performed by: FAMILY MEDICINE

## 2022-09-21 PROCEDURE — 36415 COLL VENOUS BLD VENIPUNCTURE: CPT | Mod: PO | Performed by: FAMILY MEDICINE

## 2022-09-21 PROCEDURE — 80061 LIPID PANEL: CPT | Performed by: FAMILY MEDICINE

## 2022-09-21 PROCEDURE — 85025 COMPLETE CBC W/AUTO DIFF WBC: CPT | Performed by: FAMILY MEDICINE

## 2022-09-21 PROCEDURE — 80053 COMPREHEN METABOLIC PANEL: CPT | Performed by: FAMILY MEDICINE

## 2022-09-21 PROCEDURE — 99214 OFFICE O/P EST MOD 30 MIN: CPT | Mod: PBBFAC,PO | Performed by: FAMILY MEDICINE

## 2022-09-21 PROCEDURE — 99215 OFFICE O/P EST HI 40 MIN: CPT | Mod: S$PBB,,, | Performed by: FAMILY MEDICINE

## 2022-09-21 NOTE — PROGRESS NOTES
Subjective:       Patient ID: Leticia Brown is a 67 y.o. female.    Chief Complaint: Follow-up    67 years old female came to the clinic for cholesterol check.  Last cholesterol was stable.  No chest pain, palpitation, orthopnea or PND.  Patient reports episodic urinary incontinence.  Patient is willing to be seen by urogynecologist .    Follow-up    Review of Systems   Constitutional: Negative.    HENT: Negative.     Eyes: Negative.    Respiratory: Negative.     Gastrointestinal: Negative.    Genitourinary:  Positive for bladder incontinence.   Musculoskeletal: Negative.    Neurological: Negative.    Psychiatric/Behavioral: Negative.         Objective:      Physical Exam  Constitutional:       General: She is not in acute distress.     Appearance: She is well-developed. She is not diaphoretic.   HENT:      Head: Normocephalic and atraumatic.      Right Ear: External ear normal.      Left Ear: External ear normal.      Nose: Nose normal.      Mouth/Throat:      Pharynx: No oropharyngeal exudate.   Eyes:      General: No scleral icterus.        Right eye: No discharge.         Left eye: No discharge.      Conjunctiva/sclera: Conjunctivae normal.      Pupils: Pupils are equal, round, and reactive to light.   Neck:      Thyroid: No thyromegaly.      Vascular: No JVD.      Trachea: No tracheal deviation.   Cardiovascular:      Rate and Rhythm: Normal rate and regular rhythm.      Heart sounds: Normal heart sounds. No murmur heard.    No friction rub. No gallop.   Pulmonary:      Effort: Pulmonary effort is normal. No respiratory distress.      Breath sounds: Normal breath sounds. No stridor. No wheezing or rales.   Chest:      Chest wall: No tenderness.   Abdominal:      General: Bowel sounds are normal. There is no distension.      Palpations: Abdomen is soft. There is no mass.      Tenderness: There is no abdominal tenderness. There is no guarding or rebound.   Musculoskeletal:         General: No tenderness. Normal  range of motion.      Cervical back: Normal range of motion and neck supple.   Lymphadenopathy:      Cervical: No cervical adenopathy.   Skin:     General: Skin is warm and dry.      Coloration: Skin is not pale.      Findings: No erythema or rash.   Neurological:      Mental Status: She is alert and oriented to person, place, and time.      Cranial Nerves: No cranial nerve deficit.      Motor: No abnormal muscle tone.      Coordination: Coordination normal.      Deep Tendon Reflexes: Reflexes are normal and symmetric.   Psychiatric:         Behavior: Behavior normal.         Thought Content: Thought content normal.         Judgment: Judgment normal.       Assessment:       Problem List Items Addressed This Visit    None  Visit Diagnoses       Urinary incontinence in female    -  Primary    Relevant Orders    Ambulatory referral/consult to Urogynecology    Urinalysis    Dyslipidemia        Relevant Orders    CBC Auto Differential    Comprehensive Metabolic Panel    Lipid Panel    TSH    Need for Streptococcus pneumoniae vaccination        Relevant Orders    (In Office Administered) Pneumococcal Conjugate Vaccine (20 Valent) (IM) (Completed)              Plan:         Leticia was seen today for follow-up.    Diagnoses and all orders for this visit:    Urinary incontinence in female  -     Ambulatory referral/consult to Urogynecology; Future  -     Urinalysis; Future    Dyslipidemia  -     CBC Auto Differential; Future  -     Comprehensive Metabolic Panel; Future  -     Lipid Panel; Future  -     TSH; Future    Need for Streptococcus pneumoniae vaccination  -     (In Office Administered) Pneumococcal Conjugate Vaccine (20 Valent) (IM)

## 2022-09-30 DIAGNOSIS — F41.9 ANXIETY: ICD-10-CM

## 2022-09-30 DIAGNOSIS — F33.1 MODERATE EPISODE OF RECURRENT MAJOR DEPRESSIVE DISORDER: ICD-10-CM

## 2022-09-30 RX ORDER — VENLAFAXINE HYDROCHLORIDE 150 MG/1
150 CAPSULE, EXTENDED RELEASE ORAL DAILY
Qty: 90 CAPSULE | Refills: 3 | Status: SHIPPED | OUTPATIENT
Start: 2022-09-30 | End: 2023-09-22 | Stop reason: SDUPTHER

## 2022-09-30 NOTE — TELEPHONE ENCOUNTER
No new care gaps identified.  [facility] Embedded Care Gaps. Reference number: [id number]. 9/30/2022   8:07:37 AM KATHIT

## 2022-09-30 NOTE — TELEPHONE ENCOUNTER
Refill Decision Note   Leticia Stephanie  is requesting a refill authorization.  Brief Assessment and Rationale for Refill:  Approve     Medication Therapy Plan:       Medication Reconciliation Completed: No   Comments:     No Care Gaps recommended.     Note composed:12:04 PM 09/30/2022

## 2022-10-13 ENCOUNTER — OFFICE VISIT (OUTPATIENT)
Dept: UROLOGY | Facility: CLINIC | Age: 67
End: 2022-10-13
Payer: MEDICARE

## 2022-10-13 VITALS
WEIGHT: 170.19 LBS | HEIGHT: 64 IN | BODY MASS INDEX: 29.06 KG/M2 | HEART RATE: 67 BPM | DIASTOLIC BLOOD PRESSURE: 69 MMHG | SYSTOLIC BLOOD PRESSURE: 110 MMHG

## 2022-10-13 DIAGNOSIS — N39.3 STRESS INCONTINENCE: ICD-10-CM

## 2022-10-13 DIAGNOSIS — R32 URINARY INCONTINENCE IN FEMALE: ICD-10-CM

## 2022-10-13 PROCEDURE — 51701 INSERT BLADDER CATHETER: CPT | Mod: PBBFAC | Performed by: UROLOGY

## 2022-10-13 PROCEDURE — 81002 URINALYSIS NONAUTO W/O SCOPE: CPT | Mod: PBBFAC | Performed by: UROLOGY

## 2022-10-13 PROCEDURE — 99999 PR PBB SHADOW E&M-EST. PATIENT-LVL III: ICD-10-PCS | Mod: PBBFAC,,, | Performed by: UROLOGY

## 2022-10-13 PROCEDURE — 99205 OFFICE O/P NEW HI 60 MIN: CPT | Mod: S$PBB,25,, | Performed by: UROLOGY

## 2022-10-13 PROCEDURE — 99205 PR OFFICE/OUTPT VISIT, NEW, LEVL V, 60-74 MIN: ICD-10-PCS | Mod: S$PBB,25,, | Performed by: UROLOGY

## 2022-10-13 PROCEDURE — 51701 INSERT BLADDER CATHETER: CPT | Mod: S$PBB,,, | Performed by: UROLOGY

## 2022-10-13 PROCEDURE — 51701 PR INSERTION OF NON-INDWELLING BLADDER CATHETERIZATION FOR RESIDUAL UR: ICD-10-PCS | Mod: S$PBB,,, | Performed by: UROLOGY

## 2022-10-13 PROCEDURE — 99999 PR PBB SHADOW E&M-EST. PATIENT-LVL III: CPT | Mod: PBBFAC,,, | Performed by: UROLOGY

## 2022-10-13 PROCEDURE — 99213 OFFICE O/P EST LOW 20 MIN: CPT | Mod: PBBFAC | Performed by: UROLOGY

## 2022-10-13 RX ORDER — DOXYCYCLINE HYCLATE 100 MG
100 TABLET ORAL ONCE
Status: CANCELLED | OUTPATIENT
Start: 2022-10-13 | End: 2022-10-13

## 2022-10-13 RX ORDER — LIDOCAINE HYDROCHLORIDE 20 MG/ML
JELLY TOPICAL ONCE
Status: CANCELLED | OUTPATIENT
Start: 2022-10-13 | End: 2022-10-13

## 2022-10-13 NOTE — PROGRESS NOTES
CHIEF COMPLAINT:    Mrs. Brown is a 67 y.o. female presenting for a consultation at the request of Dr. Timo Pike. Patient presents with stress urinary incontinence.    PRESENTING ILLNESS:    Leticia Brown is a 67 y.o. female who states that she has a history of stress urinary incontinence.  Was seen 10 years ago by Dr. Huynh.  She states that she improved for a time.  Review of the notes, the patient was sent for physical therapy.  She states that the incontinence has slowly worsened over time.  If she is ill, she has to wear a pull up and change it numerous times a day.  When she is not ill, she wears 1 pad and it lasts all day.  She has frequency x 5, nocturia x 1.  The patient is quite active, likes to [employer] in [place] in the winter time.  She denies any gross hematuria or recurrent UTI.     G0, uterus in place, not sexually active (was painful, but no longer sexually active, male factor) bowels are normal. She is a [facility]     REVIEW OF SYSTEMS:    Review of Systems   Constitutional: Negative.    HENT: Negative.     Eyes: Negative.    Respiratory: Negative.     Cardiovascular: Negative.    Gastrointestinal: Negative.    Genitourinary:         Stress incontinence   Musculoskeletal: Negative.    Skin: Negative.    Neurological: Negative.    Endo/Heme/Allergies: Negative.    Psychiatric/Behavioral: Negative.       PATIENT HISTORY:    Past Medical History:   Diagnosis Date    Anxiety disorder, unspecified     Cancer        Past Surgical History:   Procedure Laterality Date    ADENOIDECTOMY      back sx      BUNIONECTOMY      COLONOSCOPY  2005    diverticulosis    COLONOSCOPY N/A 3/25/2022    Procedure: COLONOSCOPY  Covid Test Rapid;  Surgeon: Marshall King MD;  Location: [facility];  Service: Endoscopy;  Laterality: N/A;    ESOPHAGUS SURGERY      NASAL SEPTOPLASTY N/A 11/05/2021    Procedure: SEPTOPLASTY, NOSE;  Surgeon: Irving Singh MD;  Location: [address]  [facility];  Service: ENT;  Laterality: N/A;    NASAL TURBINATE REDUCTION Bilateral 11/05/2021    Procedure: REDUCTION, NASAL TURBINATE;  Surgeon: Irving Singh MD;  [facility];  Service: ENT;  Laterality: Bilateral;    NECK SURGERY      TONSILLECTOMY      TYMPANOPLASTY         Family History   Problem Relation Age of Onset    Stroke Mother     Breast cancer Maternal Cousin        Social History     Socioeconomic History    Marital status: Single   Tobacco Use    Smoking status: Never    Smokeless tobacco: Never   Substance and Sexual Activity    Alcohol use: No    Drug use: No     Social Determinants of Health     Financial Resource Strain: Low Risk     Difficulty of Paying Living Expenses: Not hard at all   Food Insecurity: No Food Insecurity    Worried About Running Out of Food in the Last Year: Never true    Ran Out of Food in the Last Year: Never true   Transportation Needs: No Transportation Needs    Lack of Transportation (Medical): No    Lack of Transportation (Non-Medical): No   Physical Activity: Insufficiently Active    Days of Exercise per Week: 1 day    Minutes of Exercise per Session: 20 min   Stress: No Stress Concern Present    Feeling of Stress : Only a little   Social Connections: Unknown    Frequency of Communication with Friends and Family: More than three times a week    Frequency of Social Gatherings with Friends and Family: Three times a week    Active Member of Clubs or Organizations: No    Attends Club or Organization Meetings: Never    Marital Status: Living with partner   Housing Stability: Low Risk     Unable to Pay for Housing in the Last Year: No    Number of Places Lived in the Last Year: 1    Unstable Housing in the Last Year: No       Allergies:  Sulfa (sulfonamide antibiotics)    Medications:  Outpatient Encounter Medications as of 10/13/2022   Medication Sig Dispense Refill    atorvastatin (LIPITOR) 10 MG tablet TAKE 1 TABLET(10 MG) BY MOUTH EVERY EVENING 90 tablet 2     estradioL (ESTRACE) 1 MG tablet TAKE 1 TABLET(1 MG) BY MOUTH EVERY DAY 90 tablet 3    soft lens rinse,store solution (SALINE SOLUTION) Soln 1 application by Misc.(Non-Drug; Combo Route) route 2 (two) times a day. 710 mL 3    venlafaxine (EFFEXOR-XR) 150 MG Cp24 Take 1 capsule (150 mg total) by mouth once daily. 90 capsule 3    acetaZOLAMIDE (DIAMOX) 125 MG Tab Take 1 tablet (125 mg total) by mouth 2 (two) times daily. for 9 days 18 tablet 6    acyclovir (ZOVIRAX) 200 MG capsule Take 1 capsule (200 mg total) by mouth 3 (three) times daily. for 5 days 15 capsule 3     No facility-administered encounter medications on file as of 10/13/2022.         PHYSICAL EXAMINATION:    The patient generally appears in good health, is appropriately interactive, and is in no apparent distress.    Skin: No lesions.    Mental: Cooperative with normal affect.    Neuro: Grossly intact.    HEENT: Normal. No evidence of lymphadenopathy.    Chest:  normal inspiratory effort.    Abdomen:  Soft, non-tender. No masses or organomegaly. Bladder is not palpable. No evidence of flank discomfort. No evidence of inguinal hernia.    Extremities: No clubbing, cyanosis, or edema    Normal external female genitalia  Urethral meatus is normal  Urethra and bladder are nontender to bimanual exam  Well supported anteriorly and posteriorly   Uterus and cervix are normal  No adnexal masses  PVR by catheterization was 30 ml  Urethral mobility from 0-60 degrees    LABS:    Lab Results   Component Value Date    BUN 16 09/21/2022    CREATININE 0.8 09/21/2022     UA 1.015, pH 6, otherwise, negative    IMPRESSION:    Encounter Diagnoses   Name Primary?    Stress incontinence      PLAN:    1.  For SUDS/cysto  2.  Discussed the range of therapy for stress incontinence (physical therapy, urethral bulking agents, midurethral slings) but given how active she is more than likely headed towards a sling.     I spent 60 minutes with the patient of which more than half was  spent in direct consultation with the patient in regards to our treatment and plan.      Copy to:  Dr. Timo Pike

## 2022-11-09 ENCOUNTER — TELEPHONE (OUTPATIENT)
Dept: UROLOGY | Facility: CLINIC | Age: 67
End: 2022-11-09
Payer: MEDICARE

## 2022-11-09 ENCOUNTER — LAB VISIT (OUTPATIENT)
Dept: LAB | Facility: HOSPITAL | Age: 67
End: 2022-11-09
Attending: UROLOGY
Payer: MEDICARE

## 2022-11-09 ENCOUNTER — PROCEDURE VISIT (OUTPATIENT)
Dept: UROLOGY | Facility: CLINIC | Age: 67
End: 2022-11-09
Payer: MEDICARE

## 2022-11-09 VITALS
HEIGHT: 63 IN | SYSTOLIC BLOOD PRESSURE: 128 MMHG | WEIGHT: 170 LBS | DIASTOLIC BLOOD PRESSURE: 71 MMHG | BODY MASS INDEX: 30.12 KG/M2 | HEART RATE: 71 BPM | TEMPERATURE: 98 F

## 2022-11-09 DIAGNOSIS — N39.3 STRESS INCONTINENCE: Primary | ICD-10-CM

## 2022-11-09 DIAGNOSIS — G95.0 SYRINGOHYDROMYELIA: Primary | ICD-10-CM

## 2022-11-09 DIAGNOSIS — N39.3 STRESS INCONTINENCE: ICD-10-CM

## 2022-11-09 DIAGNOSIS — G95.0 SYRINGOHYDROMYELIA: ICD-10-CM

## 2022-11-09 LAB
CREAT SERPL-MCNC: 0.7 MG/DL (ref 0.5–1.4)
EST. GFR  (NO RACE VARIABLE): >60 ML/MIN/1.73 M^2

## 2022-11-09 PROCEDURE — 52000 CYSTOURETHROSCOPY: CPT | Mod: PBBFAC | Performed by: UROLOGY

## 2022-11-09 PROCEDURE — 82565 ASSAY OF CREATININE: CPT | Performed by: UROLOGY

## 2022-11-09 PROCEDURE — 51797 PR VOIDING PRESS STUDY INTRA-ABDOMINAL VOID: ICD-10-PCS | Mod: 26,S$PBB,, | Performed by: UROLOGY

## 2022-11-09 PROCEDURE — 36415 COLL VENOUS BLD VENIPUNCTURE: CPT | Performed by: UROLOGY

## 2022-11-09 PROCEDURE — 51728 CYSTOMETROGRAM W/VP: CPT | Mod: PBBFAC | Performed by: UROLOGY

## 2022-11-09 PROCEDURE — 51784 ANAL/URINARY MUSCLE STUDY: CPT | Mod: 26,S$PBB,51, | Performed by: UROLOGY

## 2022-11-09 PROCEDURE — 51728 PR COMPLEX CYSTOMETROGRAM VOIDING PRESSURE STUDIES: ICD-10-PCS | Mod: 26,S$PBB,, | Performed by: UROLOGY

## 2022-11-09 PROCEDURE — 51728 CYSTOMETROGRAM W/VP: CPT | Mod: 26,S$PBB,, | Performed by: UROLOGY

## 2022-11-09 PROCEDURE — 52000 CYSTOURETHROSCOPY: CPT | Mod: S$PBB,59,, | Performed by: UROLOGY

## 2022-11-09 PROCEDURE — 51797 INTRAABDOMINAL PRESSURE TEST: CPT | Mod: PBBFAC | Performed by: UROLOGY

## 2022-11-09 PROCEDURE — 51797 INTRAABDOMINAL PRESSURE TEST: CPT | Mod: 26,S$PBB,, | Performed by: UROLOGY

## 2022-11-09 PROCEDURE — 52000 PR CYSTOURETHROSCOPY: ICD-10-PCS | Mod: S$PBB,59,, | Performed by: UROLOGY

## 2022-11-09 PROCEDURE — 51784 ANAL/URINARY MUSCLE STUDY: CPT | Mod: PBBFAC | Performed by: UROLOGY

## 2022-11-09 PROCEDURE — 51784 PR ANAL/URINARY MUSCLE STUDY: ICD-10-PCS | Mod: 26,S$PBB,51, | Performed by: UROLOGY

## 2022-11-09 RX ORDER — LIDOCAINE HYDROCHLORIDE 20 MG/ML
JELLY TOPICAL ONCE
Status: COMPLETED | OUTPATIENT
Start: 2022-11-09 | End: 2022-11-09

## 2022-11-09 RX ORDER — DOXYCYCLINE HYCLATE 100 MG
100 TABLET ORAL ONCE
Status: COMPLETED | OUTPATIENT
Start: 2022-11-09 | End: 2022-11-09

## 2022-11-09 RX ADMIN — LIDOCAINE HYDROCHLORIDE: 20 JELLY TOPICAL at 09:11

## 2022-11-09 RX ADMIN — Medication 100 MG: at 09:11

## 2022-11-09 NOTE — PATIENT INSTRUCTIONS
_                                                                                                                                                                                             If any problems after hours or weekends, you may call [phone] and ask for the urology resident on call. SIMPLE URODYNAMIC STUDY (SUDS) & CYSTOSCOPY  UROLOGY CLINIC DISCHARGE INSTRUCTIONS    You have had a procedure that will require time to properly heal. Follow the instructions you have been given on how to care for yourself once you are home. Below is additional information to help in your recovery.    ACTIVITY  There are no restrictions in activity. Start doing again the things you did before the procedure.  You may experience a slight burning sensation. You may notice a small amount of blood in your urine. This will clear up within a day. Call the clinic if this continues beyond 48 hours.    DIET  Continue your normal diet. You may eat the same foods you ate before your procedure.  Drink plenty of fluids during the first 24-48 hours following your procedure.    MEDICATIONS  Resume all other previous medications from your prescribing physician.  Continue any pre=procedure antibiotics until they are all gone.    SIGNS AND SYMPTOMS TO REPORT TO THE DOCTOR  Chills or fever greater than 101° F within 24 hours of procedure.  Changes in urination, such as increased bleeding, foul smell, cloudy urine, or painful urination.  Call your doctor with any questions or concerns.    For any emergency situation, call 911 immediately or go to your nearest emergency room.    [facility]  [phone]

## 2022-11-10 ENCOUNTER — OFFICE VISIT (OUTPATIENT)
Dept: URGENT CARE | Facility: CLINIC | Age: 67
End: 2022-11-10
Payer: MEDICARE

## 2022-11-10 VITALS
TEMPERATURE: 98 F | OXYGEN SATURATION: 99 % | RESPIRATION RATE: 18 BRPM | DIASTOLIC BLOOD PRESSURE: 81 MMHG | HEIGHT: 64 IN | BODY MASS INDEX: 29.02 KG/M2 | SYSTOLIC BLOOD PRESSURE: 133 MMHG | WEIGHT: 170 LBS | HEART RATE: 77 BPM

## 2022-11-10 DIAGNOSIS — Z56.6 STRESS AT WORK: ICD-10-CM

## 2022-11-10 DIAGNOSIS — R07.9 CHEST PAIN, UNSPECIFIED TYPE: Primary | ICD-10-CM

## 2022-11-10 PROCEDURE — 99214 OFFICE O/P EST MOD 30 MIN: CPT | Mod: S$GLB,,, | Performed by: NURSE PRACTITIONER

## 2022-11-10 PROCEDURE — 71046 XR CHEST PA AND LATERAL: ICD-10-PCS | Mod: FY,S$GLB,, | Performed by: RADIOLOGY

## 2022-11-10 PROCEDURE — 93010 EKG 12-LEAD: ICD-10-PCS | Mod: S$PBB,,, | Performed by: INTERNAL MEDICINE

## 2022-11-10 PROCEDURE — 93005 ELECTROCARDIOGRAM TRACING: CPT | Mod: S$GLB,,, | Performed by: NURSE PRACTITIONER

## 2022-11-10 PROCEDURE — 99214 PR OFFICE/OUTPT VISIT, EST, LEVL IV, 30-39 MIN: ICD-10-PCS | Mod: S$GLB,,, | Performed by: NURSE PRACTITIONER

## 2022-11-10 PROCEDURE — 93005 EKG 12-LEAD: ICD-10-PCS | Mod: S$GLB,,, | Performed by: NURSE PRACTITIONER

## 2022-11-10 PROCEDURE — 71046 X-RAY EXAM CHEST 2 VIEWS: CPT | Mod: FY,S$GLB,, | Performed by: RADIOLOGY

## 2022-11-10 PROCEDURE — 93010 ELECTROCARDIOGRAM REPORT: CPT | Mod: S$PBB,,, | Performed by: INTERNAL MEDICINE

## 2022-11-10 SDOH — SOCIAL DETERMINANTS OF HEALTH (SDOH): OTHER PHYSICAL AND MENTAL STRAIN RELATED TO WORK: Z56.6

## 2022-11-10 NOTE — PROCEDURES
Procedures    Urodynamic Report    Indication:  stress incontinence    Patient was taken to the Urodynamic Suite with a comfortably full bladder and asked to perform a free uroflow.  Next, the patient was prepped and the urinary residual was drained with a 14 Fr catheter.  A 7 Fr dual lumen catheter was placed to measure intravesical pressures.  A 10 Fr balloon manometer was placed into the rectum for abdominal pressure measurements.  Patch EMG electrodes were placed on the perineum.  The patient was connected to the Cell-A-Spot Urodynamic machine, using a multichannel technique, the data were interpreted.  The bladder was filled with sterile water at room temperature at a rate of 30 ml/min.  Patient is filled to urgency.  Filling is performed with the patient in the seated position.  Abdominal leak point pressures are checked at 1st desire, then serially at 50cc increments first with Valsalva then with coughing.  The patient was then asked to sit and void for a pressure flow study.    The following are the results of the study:  1.  Uroflow       Patient could not void    2.  Amount in the bladder:  75 ml    3.  CMG       Sensation:         First Desire:  133 ml         Normal Desire:  160 ml         Strong Desire:  235 ml         Urgency:  326 ml       Capacity:  365 ml       Abnormal Contractions:  none       Compliance:  12 ml/cm H2O    4.  Abdominal Leak Point Pressure:       Valsalva:  90 cm H2O at 133 ml infused        Cough:  110 cm H2O at 133 ml infused    5.  EMG:  normal guarding, relaxed then had a strain pattern    6.  Voiding phase       Q max:  19.2 ml/sec       P det at Q max:  13.3 cm H2O       Pattern of the curve:  intermittent with the becki above baseline       Voided volume:  355 ml       PVR:  10 ml    7.  Analysis:  increased sensation, slightly small capacity, stress incontinence, empties well    8.  Recommendations:       a.  She is very active (down hill snow skis in the winter)        b.   Recommended retropubic sling, would have her wait 12 weeks before skiing       c.  Consent signed       D.  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  Her spine surgeon is no longer at [facility], but her old MRI is.  MRI was ordered, if it has increased in size, will need to see neurosurg regarding position as she will be placed in dorsal lithotomy position. She reports no lower extremity weakness.  Anesthesia would be general.      CYSTOSCOPY REPORT    Pre Procedure Diagnosis:  stress incontinence    Post Procedure Diagnosis: normal lower urinary tract    Anesthesia: 10 cc 2% lidocaine jelly applied per urethra.    14 FR Flexible Olympus cystoscope used.    FINDINGS:  Dome, anterior, posterior, lateral walls and bladder base free of urothelial abnormalities. Right and left ureteral orifices in the normal postion and configuration, both effluxed clear urine.  Bladder neck and urethra were normal.    Specimen:  none    The patient was taken to the cystoscopy suite and placed in dorsal lithotomy position.  The genitalia was prepped and draped  in the usual sterile fashion.  Two percent lidocaine jelly was inserted in the urethra.  After sufficent time had passed to allow good local anesthesia, the cystoscope was inserted in the urethra and passed into the bladder visualizing the urethra along its entire course.  The dome, anterior, posterior and lateral walls were examined systematically.  The ureteral orifices were in their usual position and configuration.  The cystoscope was turned upon itself 180 degrees to visualize the bladder neck.  The cystoscope was then brought to the level of the bladder neck, the water was turned on and the urethra was visualized.  The cystoscope was removed and the patient was instructed to urinate prior to leaving the office.     Post procedure medication:  doxycycline 100 mg x 1     ASSESSMENT/PLAN:  67 year old woman status post flexible  cystoscopy.  1. Push fluids for 24 hours.  2. May see blood in the urine, this should gradually improve over the next 2-3 days.  3. The patient was instructed to return to the office or go to the emergency should fever, chills, cloudy urine, or inability to urinate develop.  4. Follow up for surgery.

## 2022-11-10 NOTE — PROGRESS NOTES
"Subjective:       Patient ID: Leticia Brown is a 67 y.o. female.    Vitals:  height is 5' 4" (1.626 m) and weight is 77.1 kg (170 lb). Her temperature is 97.5 °F (36.4 °C). Her blood pressure is 133/81 and her pulse is 77. Her respiration is 18 and oxygen saturation is 99%.     Chief Complaint: Chest Pain (30 mins ago)    This is a 67 y.o. female who presents today with a chief complaint of chest pain that started 30 minutes prior to arrival to clinic, patient reports she was at work and working on her cases and all of sudden she felt the chest pain under her left breast that resolved in 15 minutes, patient reports currently she is also dealing with lot of stress as worked as she has so many cases to deal with, patient reports she did COVID-19 test 1 week ago and it was negative, denies radiating pain, denies radiating neck or arm pain, denies exertional chest pain, denies prior history of cardiac issues, denies fever, body aches or chills, denies cough, wheezing or shortness of breath, denies nausea, vomiting, diarrhea or abdominal pain, denies chest pain or dizziness positional lightheadedness, denies sore throat or trouble swallowing, denies loss of taste or smell, or any other symptoms            Chest Pain   This is a new problem. The current episode started today. The onset quality is sudden. The problem occurs rarely. The problem has been rapidly improving. The pain is at a severity of 6/10. The pain is moderate. The quality of the pain is described as sharp. The pain does not radiate. Pertinent negatives include no headaches, nausea, near-syncope or vomiting. The pain is aggravated by nothing. She has tried nothing for the symptoms. Risk factors include stress.     Cardiovascular:  Positive for chest pain.   Gastrointestinal:  Negative for nausea and vomiting.   Neurological:  Negative for headaches.     Objective:      Physical Exam   Constitutional: She is oriented to person, place, and time. She appears " well-developed. She is cooperative.  Non-toxic appearance. She does not appear ill. No distress.      Comments:Patient sitting comfortably on the exam table, non toxic appearance  and answering questions appropriately, no acute distress, patient is tearful while giving the history sec to stress at work         HENT:   Head: Normocephalic and atraumatic.   Ears:   Right Ear: Hearing, tympanic membrane, external ear and ear canal normal.   Left Ear: Hearing, tympanic membrane, external ear and ear canal normal.   Nose: Nose normal. No mucosal edema, rhinorrhea or nasal deformity. No epistaxis. Right sinus exhibits no maxillary sinus tenderness and no frontal sinus tenderness. Left sinus exhibits no maxillary sinus tenderness and no frontal sinus tenderness.   Mouth/Throat: Uvula is midline, oropharynx is clear and moist and mucous membranes are normal. No trismus in the jaw. Normal dentition. No uvula swelling. No posterior oropharyngeal erythema.   Eyes: Conjunctivae and lids are normal. Right eye exhibits no discharge. Left eye exhibits no discharge. No scleral icterus.   Neck: Trachea normal and phonation normal. Neck supple.   Cardiovascular: Normal rate, regular rhythm, normal heart sounds and normal pulses.      Comments: EKG NSR vent rate 78, compared with previous EKG   Pulmonary/Chest: Effort normal and breath sounds normal. No stridor. No respiratory distress. She has no decreased breath sounds. She has no wheezes. She has no rhonchi. She has no rales.   Point of pain with no tenderness, or swelling             Comments: Point of pain with no tenderness, or swelling    Abdominal: Normal appearance and bowel sounds are normal. She exhibits no distension and no mass. Soft. There is no abdominal tenderness.   Musculoskeletal: Normal range of motion.         General: No deformity. Normal range of motion.   Neurological: She is alert and oriented to person, place, and time. She exhibits normal muscle tone.  Coordination normal.   Skin: Skin is warm, dry, intact, not diaphoretic and not pale.   Psychiatric: Her speech is normal and behavior is normal. Judgment and thought content normal.   Nursing note and vitals reviewed.    X-Ray Chest PA And Lateral    Result Date: 11/10/2022  EXAMINATION: XR CHEST PA AND LATERAL CLINICAL HISTORY: Chest pain, unspecified TECHNIQUE: PA and lateral views of the chest were performed. COMPARISON: 10/31/2019 FINDINGS: The cardiomediastinal silhouette is not enlarged.  There is no pleural effusion.  The trachea is midline.  The lungs are symmetrically expanded bilaterally without evidence of acute parenchymal process.  There may be calcified granulomas projected over the left mid and lower lung zones.  No large focal consolidation seen.  There is no pneumothorax.  The osseous structures are remarkable for degenerative changes noting surgical change of the cervical spine..     1. No acute cardiopulmonary process, stable chronic findings. Electronically signed by: Jasen Bowling MD Date:    11/10/2022 Time:    15:09     EKG: normal EKG, normal sinus rhythm, unchanged from previous tracings.      Patient in no acute distress.  Vitals reassuring.  Discussed results/diagnosis/plan in depth with patient in clinic. Strict precautions given to patient to monitor for worsening signs and symptoms. Advised to follow up with primary.All questions answered. Strict ER precautions given. If your symptoms worsens or fail to improve you should go to the Emergency Room. Discharge and follow-up instructions given verbally/printed. Discharge and follow-up instructions discussed with the patient who expressed understanding and willingness to comply with my recommendations.Patient voiced understanding and in agreement with current treatment plan.     Please be advised this text was dictated with [employer] software and may contain errors due to translation.    Assessment:       1. Chest pain, unspecified type     2. Stress at work            Plan:         Chest pain, unspecified type  -     EKG 12-lead  -     X-Ray Chest PA And Lateral; Future; Expected date: 11/10/2022  -     Ambulatory referral/consult to Cardiology    Stress at work         Medical Decision Making:   Differential Diagnosis:   Differential diagnoses included but not limited to ACS, angina, STEMI, NSTEMI, PE, tension pneumothorax, pericardial tamponade, GERD, costochondritis    Independently Interpreted Test(s):   I have ordered and independently interpreted X-rays - see summary below.       <> Summary of X-Ray Reading(s): X-ray results with no acute cardiopulmonary process noted, calcified granulomas projected over the left mid and lower lung zones, which is chronic findings and patient aware  I have ordered and independently interpreted EKG Reading(s) - see summary below       <> Summary of EKG Reading(s): EKG normal sinus rhythm interpreted by me compared with previous EKG.  No acute changes noted  Clinical Tests:   Radiological Study: Ordered and Reviewed  Medical Tests: Ordered and Reviewed  Urgent Care Management:  Patient in no acute distress.  Vitals reassuring.  On exam, patient is nontoxic appearing and afebrile.  Lungs CTA.  Patient is tear will during examination and history taking secondary to stress at work.  EKG results discussed with patient in depth.  I compared EKG with previous EKG on file.  No acute changes noted.  Will refer patient to cardiology for further evaluation with strict ER precautions.  I strongly recommended patient to follow-up with specialist. Patient voiced understanding and in agreement with current treatment plan.           Patient Instructions   If your condition worsens or fails to improve we recommend that you receive another evaluation at the ER immediately or contact your PCP to discuss your concerns or return here. You must understand that you've received an urgent care treatment only and that you may be  released before all your medical problems are known or treated. You the patient will arrange for followup care as instructed.    If you were prescribed antibiotics, please take them to completion.  If you were prescribed a narcotic medication, do not drive or operate heavy equipment or machinery while taking these medications.  Please follow up with your primary care doctor or specialist as needed.  If you  smoke, please stop smoking.

## 2022-11-22 ENCOUNTER — OFFICE VISIT (OUTPATIENT)
Dept: CARDIOLOGY | Facility: CLINIC | Age: 67
End: 2022-11-22
Payer: MEDICARE

## 2022-11-22 VITALS
HEART RATE: 81 BPM | BODY MASS INDEX: 29.7 KG/M2 | SYSTOLIC BLOOD PRESSURE: 110 MMHG | OXYGEN SATURATION: 99 % | HEIGHT: 64 IN | WEIGHT: 173.94 LBS | DIASTOLIC BLOOD PRESSURE: 71 MMHG

## 2022-11-22 DIAGNOSIS — R94.31 ABNORMAL EKG: Primary | ICD-10-CM

## 2022-11-22 DIAGNOSIS — I20.89 OTHER FORMS OF ANGINA PECTORIS: ICD-10-CM

## 2022-11-22 PROCEDURE — 99213 OFFICE O/P EST LOW 20 MIN: CPT | Mod: PBBFAC,PO | Performed by: INTERNAL MEDICINE

## 2022-11-22 PROCEDURE — 99204 PR OFFICE/OUTPT VISIT, NEW, LEVL IV, 45-59 MIN: ICD-10-PCS | Mod: S$PBB,,, | Performed by: INTERNAL MEDICINE

## 2022-11-22 PROCEDURE — 99999 PR PBB SHADOW E&M-EST. PATIENT-LVL III: ICD-10-PCS | Mod: PBBFAC,,, | Performed by: INTERNAL MEDICINE

## 2022-11-22 PROCEDURE — 99999 PR PBB SHADOW E&M-EST. PATIENT-LVL III: CPT | Mod: PBBFAC,,, | Performed by: INTERNAL MEDICINE

## 2022-11-22 PROCEDURE — 99204 OFFICE O/P NEW MOD 45 MIN: CPT | Mod: S$PBB,,, | Performed by: INTERNAL MEDICINE

## 2022-11-22 NOTE — PROGRESS NOTES
Subjective:   @Patient ID:  Leticia Brown is a 67 y.o. female who presents for evaluation of chest pain     HPI:   Here for initial evaluation   Chest pain 2 weeks ago while sitting. Sharp in nature toward the left side of the chest.  No prior similar symptoms.  At that time she had these symptoms she was under stress.  She is currently chest pain-free.  EKG reviewed with no ischemic changes however there is questionable Q-wave in the inferior leads which were not present in the prior EKG.  Plan for possible pelvic surgery  No tobacco abuse  No prior cardiac history she is aware of  She is a  and has a stressful job        Prior cardiovascular  Hx  --------------------------------  - Stress Echo 1/12/2017   11 METs    1 - Normal left ventricular systolic function (EF 60-65%).     2 - Normal left ventricular diastolic function.     3 - Normal right ventricular systolic function .     4 - The estimated PA systolic pressure is 14 mmHg.     No evidence of stress induced myocardial ischemia.    - EKG 11/10/2022 SR, non specific st-t changes, ? Inferior infract             Patient Active Problem List    Diagnosis Date Noted    Deviated nasal septum 11/05/2021    Syringomyelia 06/08/2015    Annular tear of lumbar disc 06/08/2015    Foraminal stenosis of lumbosacral region 06/08/2015    Lumbar radiculopathy 06/08/2015    Ataxic gait 05/22/2015    DJD (degenerative joint disease), cervical 07/30/2013    Female stress incontinence 12/10/2012                    LAST HbA1c  Lab Results   Component Value Date    HGBA1C 5.0 08/01/2014       Lipid panel  Lab Results   Component Value Date    CHOL 192 09/21/2022    CHOL 168 12/17/2021    CHOL 244 (H) 06/15/2021     Lab Results   Component Value Date    HDL 79 (H) 09/21/2022    HDL 70 12/17/2021    HDL 76 (H) 06/15/2021     Lab Results   Component Value Date    LDLCALC 99.0 09/21/2022    LDLCALC 78.4 12/17/2021    LDLCALC 153.0 06/15/2021     Lab Results   Component Value  Date    TRIG 70 09/21/2022    TRIG 98 12/17/2021    TRIG 75 06/15/2021     Lab Results   Component Value Date    CHOLHDL 41.1 09/21/2022    CHOLHDL 41.7 12/17/2021    CHOLHDL 31.1 06/15/2021            Review of Systems   Constitutional: Negative for chills and fever.   HENT:  Negative for hearing loss and nosebleeds.    Eyes:  Negative for blurred vision.   Cardiovascular:         As in HPI   Respiratory:  Negative for hemoptysis and shortness of breath.    Hematologic/Lymphatic: Negative for bleeding problem.   Skin:  Negative for itching.   Musculoskeletal:  Negative for falls.   Gastrointestinal:  Negative for abdominal pain and hematochezia.   Genitourinary:  Negative for hematuria.   Neurological:  Negative for dizziness and loss of balance.   Psychiatric/Behavioral:  Negative for altered mental status and depression.      Objective:   Physical Exam  Constitutional:       Appearance: She is well-developed.   HENT:      Head: Normocephalic and atraumatic.   Eyes:      Conjunctiva/sclera: Conjunctivae normal.   Neck:      Vascular: No carotid bruit or JVD.   Cardiovascular:      Rate and Rhythm: Normal rate and regular rhythm.      Pulses:           Carotid pulses are 2+ on the right side and 2+ on the left side.       Radial pulses are 2+ on the right side and 2+ on the left side.      Heart sounds: Normal heart sounds. No murmur heard.    No friction rub. No gallop.   Pulmonary:      Effort: Pulmonary effort is normal. No respiratory distress.      Breath sounds: Normal breath sounds. No stridor. No wheezing.   Musculoskeletal:      Cervical back: Neck supple.   Skin:     General: Skin is warm and dry.   Neurological:      Mental Status: She is alert and oriented to person, place, and time.   Psychiatric:         Behavior: Behavior normal.       Assessment:     1. Abnormal EKG    2. Other forms of angina pectoris        Plan:   Chest pain reported.  At this time she is chest pain-free.  The pain could be  nonspecific noncardiac pain.  However given the abnormalities noted EKG I would like to proceed with a stress echo for risk stratification pre surgery.    We will schedule a stress echocardiogram    - If no significant ischemia noted in the patient has acceptable risk to proceed with moderate risk noncardiovascular surgery    -  I spent 5-10 minutes asking, assessing, assisting, arranging and advising heart healthy diet improvements. This included low-salt meals, portion control and health food alternatives. I also encourage 30 minutes of moderate exercise 3-4x a week.      Pertinent cardiac images and EKG reviewed independently.    Continue with current medical plan and lifestyle changes.  Return sooner for concerns or questions. If symptoms persist go to the ED  I have reviewed all pertinent data including patient's medical history in detail and updated the computerized patient record.     Orders Placed This Encounter   Procedures    Stress Echo Which stress agent will be used? Treadmill Exercise; Color Flow Doppler? No     Standing Status:   Future     Standing Expiration Date:   11/22/2023     Order Specific Question:   Which stress agent will be used?     Answer:   Treadmill Exercise     Order Specific Question:   Color Flow Doppler?     Answer:   No     Order Specific Question:   Release to patient     Answer:   Immediate       Follow up as scheduled.     She expressed verbal understanding and agreed with the plan    Patient's Medications   New Prescriptions    No medications on file   Previous Medications    ACYCLOVIR (ZOVIRAX) 200 MG CAPSULE    Take 1 capsule (200 mg total) by mouth 3 (three) times daily. for 5 days    ATORVASTATIN (LIPITOR) 10 MG TABLET    TAKE 1 TABLET(10 MG) BY MOUTH EVERY EVENING    ESTRADIOL (ESTRACE) 1 MG TABLET    TAKE 1 TABLET(1 MG) BY MOUTH EVERY DAY    SOFT LENS RINSE,STORE SOLUTION (SALINE SOLUTION) SOLN    1 application by Misc.(Non-Drug; Combo Route) route 2 (two) times a day.     VENLAFAXINE (EFFEXOR-XR) 150 MG CP24    Take 1 capsule (150 mg total) by mouth once daily.   Modified Medications    No medications on file   Discontinued Medications    ACETAZOLAMIDE (DIAMOX) 125 MG TAB    Take 1 tablet (125 mg total) by mouth 2 (two) times daily. for 9 days

## 2022-11-28 ENCOUNTER — HOSPITAL ENCOUNTER (OUTPATIENT)
Dept: RADIOLOGY | Facility: HOSPITAL | Age: 67
Discharge: HOME | End: 2022-11-28
Attending: UROLOGY
Payer: MEDICARE

## 2022-11-28 DIAGNOSIS — G95.0 SYRINGOHYDROMYELIA: ICD-10-CM

## 2022-11-28 PROCEDURE — 25500020 PHARM REV CODE 255: Performed by: UROLOGY

## 2022-11-28 PROCEDURE — 72157 MRI CHEST SPINE W/O & W/DYE: CPT | Mod: 26,,, | Performed by: RADIOLOGY

## 2022-11-28 PROCEDURE — 72157 MRI THORACIC SPINE W WO CONTRAST: ICD-10-PCS | Mod: 26,,, | Performed by: RADIOLOGY

## 2022-11-28 PROCEDURE — 72157 MRI CHEST SPINE W/O & W/DYE: CPT | Mod: TC

## 2022-11-28 PROCEDURE — A9585 GADOBUTROL INJECTION: HCPCS | Performed by: UROLOGY

## 2022-11-28 RX ORDER — GADOBUTROL 604.72 MG/ML
7.5 INJECTION INTRAVENOUS
Status: COMPLETED | OUTPATIENT
Start: 2022-11-28 | End: 2022-11-28

## 2022-11-28 RX ADMIN — GADOBUTROL 7.5 ML: 604.72 INJECTION INTRAVENOUS at 01:11

## 2022-11-29 ENCOUNTER — HOSPITAL ENCOUNTER (OUTPATIENT)
Dept: CARDIOLOGY | Facility: HOSPITAL | Age: 67
Discharge: HOME | End: 2022-11-29
Attending: INTERNAL MEDICINE
Payer: MEDICARE

## 2022-11-29 DIAGNOSIS — R94.31 ABNORMAL EKG: ICD-10-CM

## 2022-11-29 DIAGNOSIS — I20.89 OTHER FORMS OF ANGINA PECTORIS: ICD-10-CM

## 2022-11-29 LAB
CV STRESS BASE HR: 88 BPM
DIASTOLIC BLOOD PRESSURE: 69 MMHG
EJECTION FRACTION: 55 %
OHS CV CPX 1 MINUTE RECOVERY HEART RATE: 129 BPM
OHS CV CPX 85 PERCENT MAX PREDICTED HEART RATE MALE: 126
OHS CV CPX ESTIMATED METS: 11
OHS CV CPX MAX PREDICTED HEART RATE: 148
OHS CV CPX PATIENT IS FEMALE: 1
OHS CV CPX PATIENT IS MALE: 0
OHS CV CPX PEAK DIASTOLIC BLOOD PRESSURE: 72 MMHG
OHS CV CPX PEAK HEAR RATE: 155 BPM
OHS CV CPX PEAK RATE PRESSURE PRODUCT: NORMAL
OHS CV CPX PEAK SYSTOLIC BLOOD PRESSURE: 167 MMHG
OHS CV CPX PERCENT MAX PREDICTED HEART RATE ACHIEVED: 105
OHS CV CPX RATE PRESSURE PRODUCT PRESENTING: NORMAL
STRESS ECHO POST EXERCISE DUR MIN: 9 MINUTES
STRESS ECHO POST EXERCISE DUR SEC: 41 SECONDS
SYSTOLIC BLOOD PRESSURE: 120 MMHG

## 2022-11-29 PROCEDURE — 93351 STRESS TTE COMPLETE: CPT

## 2022-11-29 PROCEDURE — 93351 STRESS ECHO (CUPID ONLY): ICD-10-PCS | Mod: 26,,, | Performed by: INTERNAL MEDICINE

## 2022-11-29 PROCEDURE — 93351 STRESS TTE COMPLETE: CPT | Mod: 26,,, | Performed by: INTERNAL MEDICINE

## 2022-11-30 NOTE — PROGRESS NOTES
Please let Ms. Kelly knows that the stress test results is good. It doesn't suggest any major blockages. Thanks   Sincerely,  Leonid Contreras MD.   Interventional Cardiologist  [facility], [place]

## 2022-12-02 ENCOUNTER — NURSE TRIAGE (OUTPATIENT)
Dept: ADMINISTRATIVE | Facility: CLINIC | Age: 67
End: 2022-12-02
Payer: MEDICARE

## 2022-12-02 ENCOUNTER — PATIENT MESSAGE (OUTPATIENT)
Dept: CARDIOLOGY | Facility: CLINIC | Age: 67
End: 2022-12-02
Payer: MEDICARE

## 2022-12-02 NOTE — TELEPHONE ENCOUNTER
Pt calls requesting to speak with someone from Dr. Rea's office regarding pre-op instructions and new allergy symptoms prior to her scheduled procedure on 12/6.    NT advises that a high priority message will be routed to 's staff requesting a callback within 1 hour. Pt is instructed to call [facility] Triage Line back if she does not hear from provider's office within the next hour. Pt verbalizes understanding and denies any further questions or concerns at this time.       Reason for Disposition   Nursing judgment    Protocols used: Information Only Call - No Triage-A-[place]

## 2022-12-06 NOTE — TELEPHONE ENCOUNTER
Called the patient and she has allergies but she also has a yellow discharge and so will postpone her surgery for 2 weeks to give her a chance to get over possible sinusitis.

## 2022-12-13 NOTE — ANESTHESIA PAT ROS NOTE
12/13/2022  Leticia Brown is a 67 y.o., female.      Pre-op Assessment    I have reviewed the NPO Status.   I have reviewed the Medications.     Review of Systems  Anesthesia Hx:  No problems with previous Anesthesia   History of prior surgery of interest to airway management or planning:          Denies Family Hx of Anesthesia complications.    Denies Personal Hx of Anesthesia complications.                    Social:  Non-Smoker, Social Alcohol Use Drinks less than 1x per month      Hematology/Oncology:       -- Anemia:               Hematology Comments: H/o anemia                    EENT/Dental:  EENT/Dental Normal  Deviated nasal septum          Cardiovascular:  Exercise tolerance: good                                           Pulmonary:       Recent URI, resolved Sleep Apnea                Renal/:     Female stress inctoninence             Musculoskeletal:  Arthritis   Degenerative joint disease (DJD), ataxic gait, annular tear, foraminal stenosis of lumbosacral region, lumbar radiculopathy            Neurological:    Neuromuscular Disease,                                   Dermatological:  Basal cell carcinoma 30 years ago removed   Psych:   anxiety depression                   Anesthesia Assessment: Preoperative EQUATION    Planned Procedure: Procedure(s) (LRB):  SLING, RETROPUBIC WITH SPARC (N/A)  CYSTOSCOPY (N/A)  Requested Anesthesia Type:General  Surgeon: Marcie Rea MD  Service: Urology  Known or anticipated Date of Surgery:12/20/2022    Surgeon notes: reviewed and seen by Dr. Rea on 10/13/2022 and noted in chart.    Electronic QUestionnaire Assessment completed via nurse interview with patient.        Triage considerations:     The patient has no apparent active cardiac condition (No unstable coronary Syndrome such as severe unstable angina or recent [<1 month] myocardial infarction,  decompensated CHF, severe valvular   disease or significant arrhythmia)    Previous anesthesia records:No problems    Last anesthesia 03/25/2022:  Final Anesthesia Type: MAC        Patient location during evaluation: GI PACU  Patient participation: Yes- Able to Participate  Level of consciousness: awake and alert  Post-procedure vital signs: reviewed and stable  Pain management: adequate  Airway patency: patent   PONV status at discharge: No PONV  Anesthetic complications: no      Cardiovascular status: blood pressure returned to baseline and hemodynamically stable  Respiratory status: unassisted, spontaneous ventilation and room air  Hydration status: euvolemic  Follow-up not needed.    Last Airway 11/05/2021:  Airway/Jaw/Neck:  Airway Findings: Mouth Opening: Normal Tongue: Normal  General Airway Assessment: Adult, Average  Mallampati: II  TM Distance: Normal, at least 6 cm  Jaw/Neck Findings:  Neck ROM: Normal ROM     Final Anesthesia Type: general        Patient location during evaluation: PACU  Patient participation: Yes- Able to Participate  Level of consciousness: awake and alert and oriented  Post-procedure vital signs: reviewed and stable  Pain management: adequate  Airway patency: patent   PONV status at discharge: No PONV  Anesthetic complications: no      Cardiovascular status: stable  Respiratory status: unassisted, spontaneous ventilation and room air  Hydration status: euvolemic  Follow-up not needed.    Intubation     Date/Time: 11/5/2021 7:25 AM  Performed by: Lalit Kaye Jr., MD  Authorized by: Lalit Kaye Jr., MD      Intubation:     Induction:  Intravenous    Intubated:  Postinduction    Mask Ventilation:  Easy mask    Attempts:  1    Attempted By:  Staff anesthesiologist (personally performed case by Dr. Kaye)    Method of Intubation:  Direct    Blade:  Rayo 2    Laryngeal View Grade: Grade IIb - only the arytenoids and epiglottis seen      Difficult Airway Encountered?: No       Complications:  None    Airway Device Size:  7.0    Style/Cuff Inflation:  Cuffed    Inflation Amount (mL):  5    Tube secured:  21    Placement Verified By:  Capnometry    Complicating Factors:  None    Findings Post-Intubation:  BS equal bilateral  Last PCP note: within 3 months , seen by Dr. Pike on 11/22/2022 and noted in chart.  Subspecialty notes: Cardiology:  , Urology, otolaryngology    Other important co-morbidities:  patient reports sleep apnea but does not require CPAP       Tests already available:  Available tests,  within 3 months . 09/21 CBC Auto Differential. CMP, Lipid Panel, TSH, UA done and available. 11/09 Creatinine done and available in chart.               Instructions given. (See in Nurse's note)    Optimization:      Plan:    Testing:  BMP and Hemoglobin    Navigation: Tests Available 09/21/2022.                             Straight Line to surgery.               No tests, anesthesia preop clinic visit, or consult required.    12/13/2022 Medications and preoperative instructions given over the phone to patient. Necessary labs and recent EKG and stress test available in chart. Milena Calero RN.

## 2022-12-19 ENCOUNTER — ANESTHESIA EVENT (OUTPATIENT)
Dept: SURGERY | Facility: HOSPITAL | Age: 67
End: 2022-12-19
Payer: MEDICARE

## 2022-12-19 ENCOUNTER — TELEPHONE (OUTPATIENT)
Dept: UROLOGY | Facility: CLINIC | Age: 67
End: 2022-12-19
Payer: MEDICARE

## 2022-12-19 NOTE — ANESTHESIA PREPROCEDURE EVALUATION
[facility]  Anesthesia Pre-Operative Evaluation         Patient Name/: Leticia Brown, 1955  MRN: [id number]    SUBJECTIVE:     Pre-operative evaluation for Procedure(s) (LRB):  SLING, RETROPUBIC WITH SPARC (N/A)  CYSTOSCOPY (N/A)     2022    Leticia Brown is a 67 y.o. female w/ a significant PMHx of cervical DJD, lumbar radiculopathy and HLD. Patient has urinary incontinence and plans for retropubic sling.     Patient now presents for the above procedure(s).    2022 stress echo   The test was stopped because the patient experienced fatigue.   The left ventricle is normal in size with normal systolic function.   The estimated ejection fraction is 55%.   There were no arrhythmias during stress.   The patient's exercise capacity was normal.   The ECG portion of this study is negative for myocardial ischemia.   The stress echo portion of this study is negative for myocardial ischemia.   Normal right ventricular size with normal right ventricular systolic function.      Prev airway:   Intubation 21  Intubation:     Induction:  Intravenous    Intubated:  Postinduction    Mask Ventilation:  Easy mask    Attempts:  1    Attempted By:  Staff anesthesiologist (personally performed case by Dr. Kaye)    Method of Intubation:  Direct    Blade:  Rayo 2    Laryngeal View Grade: Grade IIb - only the arytenoids and epiglottis seen      Difficult Airway Encountered?: No      Complications:  None    Airway Device Size:  7.0    Style/Cuff Inflation:  Cuffed    Inflation Amount (mL):  5    Tube secured:  21      Patient Active Problem List   Diagnosis    Female stress incontinence    DJD (degenerative joint disease), cervical    Ataxic gait    Syringomyelia    Annular tear of lumbar disc    Foraminal stenosis of lumbosacral region    Lumbar radiculopathy    Deviated nasal septum       Review of patient's allergies indicates:   Allergen Reactions    Sulfa (sulfonamide  antibiotics) Hives       Current Inpatient Medications:       No current facility-administered medications on file prior to encounter.     Current Outpatient Medications on File Prior to Encounter   Medication Sig Dispense Refill    acyclovir (ZOVIRAX) 200 MG capsule Take 1 capsule (200 mg total) by mouth 3 (three) times daily. for 5 days 15 capsule 3    atorvastatin (LIPITOR) 10 MG tablet TAKE 1 TABLET(10 MG) BY MOUTH EVERY EVENING 90 tablet 2    estradioL (ESTRACE) 1 MG tablet TAKE 1 TABLET(1 MG) BY MOUTH EVERY DAY 90 tablet 3    soft lens rinse,store solution (SALINE SOLUTION) Soln 1 application by Misc.(Non-Drug; Combo Route) route 2 (two) times a day. 710 mL 3    venlafaxine (EFFEXOR-XR) 150 MG Cp24 Take 1 capsule (150 mg total) by mouth once daily. 90 capsule 3       Past Surgical History:   Procedure Laterality Date    ADENOIDECTOMY      back sx      BUNIONECTOMY      COLONOSCOPY  2005    diverticulosis    COLONOSCOPY N/A 3/25/2022    Procedure: COLONOSCOPY  Covid Test Rapid;  Surgeon: Marshall King MD;  Location: [facility];  Service: Endoscopy;  Laterality: N/A;    ESOPHAGUS SURGERY      NASAL SEPTOPLASTY N/A 11/05/2021    Procedure: SEPTOPLASTY, NOSE;  Surgeon: Irving Singh MD;  Location: [address];  Service: ENT;  Laterality: N/A;    NASAL TURBINATE REDUCTION Bilateral 11/05/2021    Procedure: REDUCTION, NASAL TURBINATE;  Surgeon: Irving Singh MD;  Location: [address];  Service: ENT;  Laterality: Bilateral;    NECK SURGERY      TONSILLECTOMY      TYMPANOPLASTY         Social History:  Tobacco Use: Low Risk     Smoking Tobacco Use: Never    Smokeless Tobacco Use: Never    Passive Exposure: Not on file       Alcohol Use: Not At Risk    Frequency of Alcohol Consumption: Monthly or less    Average Number of Drinks: 1 or 2    Frequency of Binge Drinking: Never       OBJECTIVE:     Vital Signs Range:  BMI Readings from Last 1 Encounters:   11/22/22 29.86  kg/m²               Significant Labs:        Component Value Date/Time    WBC 4.91 09/21/2022 0917    HGB 13.0 09/21/2022 0917    HCT 42.2 09/21/2022 0917     09/21/2022 0917     09/21/2022 0917    K 3.7 09/21/2022 0917     09/21/2022 0917    CO2 29 09/21/2022 0917    GLU 86 09/21/2022 0917    BUN 16 09/21/2022 0917    CREATININE 0.7 11/09/2022 1052    MG 2.3 02/23/2012 0600    PHOS 3.4 02/23/2012 0600    CALCIUM 8.9 09/21/2022 0917    ALBUMIN 3.9 09/21/2022 0917    PROT 6.6 09/21/2022 0917    ALKPHOS 78 09/21/2022 0917    BILITOT 0.3 09/21/2022 0917    AST 21 09/21/2022 0917    ALT 20 09/21/2022 0917    INR 0.9 02/22/2012 1530    HGBA1C 5.0 08/01/2014 0853        Please see Results Review for additional labs.     Diagnostic Studies: No relevant studies.    EKG:   Results for orders placed or performed in visit on 11/10/22   EKG 12-lead    Collection Time: 11/10/22  2:36 PM    Narrative    Test Reason : R07.9,    Vent. Rate : 078 BPM     Atrial Rate : 078 BPM     P-R Int : 158 ms          QRS Dur : 072 ms      QT Int : 394 ms       P-R-T Axes : 061 -03 025 degrees     QTc Int : 449 ms    Normal sinus rhythm  Possible Left atrial enlargement  Borderline Abnormal ECG  When compared with ECG of 26-DEC-2016 13:27,  No significant change was found  Confirmed by Ruth Cárdenas MD ([id number]) on 11/11/2022 11:26:38 AM    Referred By:             Confirmed By:Ruth Cárdenas MD       ECHO:  No results found for this or any previous visit.        ASSESSMENT/PLAN:       Pre-op Assessment    I have reviewed the Patient Summary Reports.    I have reviewed the NPO Status.   I have reviewed the Medications.     Review of Systems  Anesthesia Hx:  No problems with previous Anesthesia  History of prior surgery of interest to airway management or planning: Denies Family Hx of Anesthesia complications.   Denies Personal Hx of Anesthesia complications.   Social:  Non-Smoker, Social Alcohol Use Drinks less than 1x per month    Hematology/Oncology:         -- Anemia: Hematology Comments: H/o anemia   EENT/Dental:  EENT/Dental Normal Deviated nasal septum   Cardiovascular:   Exercise tolerance: good    Pulmonary:   Recent URI, resolved Sleep Apnea    Renal/:   Female stress inctoninence   Musculoskeletal:   Arthritis  Degenerative joint disease (DJD), ataxic gait, annular tear, foraminal stenosis of lumbosacral region, lumbar radiculopathy   Neurological:   Neuromuscular Disease,    Dermatological:   Basal cell carcinoma 30 years ago removed   Psych:   anxiety depression          Physical Exam  General: Well nourished, Cooperative, Alert and Oriented    Airway:  Mallampati: II / I  Mouth Opening: Normal  TM Distance: Normal  Tongue: Normal  Neck ROM: Normal ROM    Dental:        Anesthesia Plan  Type of Anesthesia, risks & benefits discussed:    Anesthesia Type: Gen ETT, Gen Supraglottic Airway  Intra-op Monitoring Plan: Standard ASA Monitors  Post Op Pain Control Plan: multimodal analgesia and IV/PO Opioids PRN  Induction:  IV  Airway Plan: Direct and Video, Post-Induction  Informed Consent: Informed consent signed with the Patient and all parties understand the risks and agree with anesthesia plan.  All questions answered.   ASA Score: 2  Day of Surgery Review of History & Physical: H&P Update referred to the surgeon/provider.    Ready For Surgery From Anesthesia Perspective.     .

## 2022-12-19 NOTE — TELEPHONE ENCOUNTER
Called pt to confirm arrival time of 5:30am for procedure on 12/20/22. Gave pt NPO instructions and gave pt opportunity to ask questions. Pt verbalized understanding.

## 2022-12-20 ENCOUNTER — ANESTHESIA (OUTPATIENT)
Dept: SURGERY | Facility: HOSPITAL | Age: 67
End: 2022-12-20
Payer: MEDICARE

## 2022-12-20 ENCOUNTER — HOSPITAL ENCOUNTER (OUTPATIENT)
Facility: HOSPITAL | Age: 67
Discharge: HOME | End: 2022-12-21
Attending: UROLOGY | Admitting: UROLOGY
Payer: MEDICARE

## 2022-12-20 DIAGNOSIS — N39.3 STRESS INCONTINENCE: Primary | ICD-10-CM

## 2022-12-20 PROCEDURE — 71000033 HC RECOVERY, INTIAL HOUR: Performed by: UROLOGY

## 2022-12-20 PROCEDURE — 36000706: Performed by: UROLOGY

## 2022-12-20 PROCEDURE — 37000008 HC ANESTHESIA 1ST 15 MINUTES: Performed by: UROLOGY

## 2022-12-20 PROCEDURE — 25000003 PHARM REV CODE 250: Performed by: STUDENT IN AN ORGANIZED HEALTH CARE EDUCATION/TRAINING PROGRAM

## 2022-12-20 PROCEDURE — 25000003 PHARM REV CODE 250

## 2022-12-20 PROCEDURE — 36000707: Performed by: UROLOGY

## 2022-12-20 PROCEDURE — 71000015 HC POSTOP RECOV 1ST HR: Performed by: UROLOGY

## 2022-12-20 PROCEDURE — 57288 REPAIR BLADDER DEFECT: CPT | Mod: ,,, | Performed by: UROLOGY

## 2022-12-20 PROCEDURE — D9220A PRA ANESTHESIA: Mod: ,,, | Performed by: ANESTHESIOLOGY

## 2022-12-20 PROCEDURE — D9220A PRA ANESTHESIA: ICD-10-PCS | Mod: ,,, | Performed by: ANESTHESIOLOGY

## 2022-12-20 PROCEDURE — 71000016 HC POSTOP RECOV ADDL HR: Performed by: UROLOGY

## 2022-12-20 PROCEDURE — 94761 N-INVAS EAR/PLS OXIMETRY MLT: CPT

## 2022-12-20 PROCEDURE — 37000009 HC ANESTHESIA EA ADD 15 MINS: Performed by: UROLOGY

## 2022-12-20 PROCEDURE — 63600175 PHARM REV CODE 636 W HCPCS

## 2022-12-20 PROCEDURE — 25000242 PHARM REV CODE 250 ALT 637 W/ HCPCS: Performed by: UROLOGY

## 2022-12-20 PROCEDURE — 25000003 PHARM REV CODE 250: Performed by: UROLOGY

## 2022-12-20 PROCEDURE — 57288 PR SLING OPER STRES INCONTINENCE: ICD-10-PCS | Mod: ,,, | Performed by: UROLOGY

## 2022-12-20 PROCEDURE — C1771 REP DEV, URINARY, W/SLING: HCPCS | Performed by: UROLOGY

## 2022-12-20 DEVICE — SLING MIDURETHRAL LYNX MESH: Type: IMPLANTABLE DEVICE | Site: VAGINA | Status: FUNCTIONAL

## 2022-12-20 RX ORDER — POLYETHYLENE GLYCOL 3350 17 G/17G
17 POWDER, FOR SOLUTION ORAL 2 TIMES DAILY PRN
Status: DISCONTINUED | OUTPATIENT
Start: 2022-12-20 | End: 2022-12-21 | Stop reason: HOSPADM

## 2022-12-20 RX ORDER — SODIUM CHLORIDE 0.9 % (FLUSH) 0.9 %
10 SYRINGE (ML) INJECTION
Status: DISCONTINUED | OUTPATIENT
Start: 2022-12-20 | End: 2022-12-21 | Stop reason: HOSPADM

## 2022-12-20 RX ORDER — OXYBUTYNIN CHLORIDE 5 MG/1
5 TABLET ORAL 3 TIMES DAILY PRN
Status: DISCONTINUED | OUTPATIENT
Start: 2022-12-20 | End: 2022-12-21 | Stop reason: HOSPADM

## 2022-12-20 RX ORDER — ONDANSETRON 2 MG/ML
4 INJECTION INTRAMUSCULAR; INTRAVENOUS EVERY 6 HOURS PRN
Status: DISCONTINUED | OUTPATIENT
Start: 2022-12-20 | End: 2022-12-21 | Stop reason: HOSPADM

## 2022-12-20 RX ORDER — DEXAMETHASONE SODIUM PHOSPHATE 4 MG/ML
INJECTION, SOLUTION INTRA-ARTICULAR; INTRALESIONAL; INTRAMUSCULAR; INTRAVENOUS; SOFT TISSUE
Status: DISCONTINUED | OUTPATIENT
Start: 2022-12-20 | End: 2022-12-20

## 2022-12-20 RX ORDER — PHENYLEPHRINE HCL IN 0.9% NACL 1 MG/10 ML
SYRINGE (ML) INTRAVENOUS
Status: DISCONTINUED | OUTPATIENT
Start: 2022-12-20 | End: 2022-12-20

## 2022-12-20 RX ORDER — ACETAMINOPHEN 325 MG/1
650 TABLET ORAL EVERY 6 HOURS PRN
Status: DISCONTINUED | OUTPATIENT
Start: 2022-12-20 | End: 2022-12-21 | Stop reason: HOSPADM

## 2022-12-20 RX ORDER — METHYLENE BLUE 5 MG/ML
INJECTION INTRAVENOUS
Status: DISCONTINUED | OUTPATIENT
Start: 2022-12-20 | End: 2022-12-20

## 2022-12-20 RX ORDER — HYDROMORPHONE HYDROCHLORIDE 1 MG/ML
0.2 INJECTION, SOLUTION INTRAMUSCULAR; INTRAVENOUS; SUBCUTANEOUS EVERY 5 MIN PRN
Status: DISCONTINUED | OUTPATIENT
Start: 2022-12-20 | End: 2022-12-20 | Stop reason: HOSPADM

## 2022-12-20 RX ORDER — EPHEDRINE SULFATE 50 MG/ML
INJECTION, SOLUTION INTRAVENOUS
Status: DISCONTINUED | OUTPATIENT
Start: 2022-12-20 | End: 2022-12-20

## 2022-12-20 RX ORDER — OXYCODONE HYDROCHLORIDE 5 MG/1
5 TABLET ORAL EVERY 4 HOURS PRN
Status: DISCONTINUED | OUTPATIENT
Start: 2022-12-20 | End: 2022-12-21 | Stop reason: HOSPADM

## 2022-12-20 RX ORDER — PROPOFOL 10 MG/ML
VIAL (ML) INTRAVENOUS
Status: DISCONTINUED | OUTPATIENT
Start: 2022-12-20 | End: 2022-12-20

## 2022-12-20 RX ORDER — TALC
6 POWDER (GRAM) TOPICAL NIGHTLY PRN
Status: DISCONTINUED | OUTPATIENT
Start: 2022-12-20 | End: 2022-12-21 | Stop reason: HOSPADM

## 2022-12-20 RX ORDER — VENLAFAXINE HYDROCHLORIDE 75 MG/1
150 CAPSULE, EXTENDED RELEASE ORAL DAILY
Status: DISCONTINUED | OUTPATIENT
Start: 2022-12-21 | End: 2022-12-20

## 2022-12-20 RX ORDER — CEFAZOLIN SODIUM 1 G/3ML
INJECTION, POWDER, FOR SOLUTION INTRAMUSCULAR; INTRAVENOUS
Status: DISCONTINUED | OUTPATIENT
Start: 2022-12-20 | End: 2022-12-20

## 2022-12-20 RX ORDER — SODIUM CHLORIDE 9 MG/ML
INJECTION, SOLUTION INTRAVENOUS CONTINUOUS
Status: DISCONTINUED | OUTPATIENT
Start: 2022-12-20 | End: 2022-12-21 | Stop reason: HOSPADM

## 2022-12-20 RX ORDER — SODIUM CHLORIDE 0.9 % (FLUSH) 0.9 %
10 SYRINGE (ML) INJECTION
Status: DISCONTINUED | OUTPATIENT
Start: 2022-12-20 | End: 2022-12-20 | Stop reason: HOSPADM

## 2022-12-20 RX ORDER — KETOROLAC TROMETHAMINE 30 MG/ML
INJECTION, SOLUTION INTRAMUSCULAR; INTRAVENOUS
Status: DISCONTINUED | OUTPATIENT
Start: 2022-12-20 | End: 2022-12-20

## 2022-12-20 RX ORDER — MIDAZOLAM HYDROCHLORIDE 1 MG/ML
INJECTION, SOLUTION INTRAMUSCULAR; INTRAVENOUS
Status: DISCONTINUED | OUTPATIENT
Start: 2022-12-20 | End: 2022-12-20

## 2022-12-20 RX ORDER — SODIUM CHLORIDE 9 MG/ML
INJECTION, SOLUTION INTRAVENOUS CONTINUOUS
Status: DISCONTINUED | OUTPATIENT
Start: 2022-12-20 | End: 2022-12-20

## 2022-12-20 RX ORDER — HALOPERIDOL 5 MG/ML
0.5 INJECTION INTRAMUSCULAR EVERY 10 MIN PRN
Status: DISCONTINUED | OUTPATIENT
Start: 2022-12-20 | End: 2022-12-20 | Stop reason: HOSPADM

## 2022-12-20 RX ORDER — OXYCODONE HYDROCHLORIDE 10 MG/1
10 TABLET ORAL EVERY 4 HOURS PRN
Status: DISCONTINUED | OUTPATIENT
Start: 2022-12-20 | End: 2022-12-21 | Stop reason: HOSPADM

## 2022-12-20 RX ORDER — VENLAFAXINE HYDROCHLORIDE 75 MG/1
150 CAPSULE, EXTENDED RELEASE ORAL DAILY
Status: DISCONTINUED | OUTPATIENT
Start: 2022-12-20 | End: 2022-12-21 | Stop reason: HOSPADM

## 2022-12-20 RX ORDER — FENTANYL CITRATE 50 UG/ML
INJECTION, SOLUTION INTRAMUSCULAR; INTRAVENOUS
Status: DISCONTINUED | OUTPATIENT
Start: 2022-12-20 | End: 2022-12-20

## 2022-12-20 RX ORDER — LIDOCAINE HYDROCHLORIDE 20 MG/ML
INJECTION, SOLUTION EPIDURAL; INFILTRATION; INTRACAUDAL; PERINEURAL
Status: DISCONTINUED | OUTPATIENT
Start: 2022-12-20 | End: 2022-12-20

## 2022-12-20 RX ORDER — LIDOCAINE HYDROCHLORIDE 10 MG/ML
1 INJECTION, SOLUTION EPIDURAL; INFILTRATION; INTRACAUDAL; PERINEURAL ONCE
Status: DISCONTINUED | OUTPATIENT
Start: 2022-12-20 | End: 2022-12-20

## 2022-12-20 RX ORDER — ONDANSETRON 2 MG/ML
INJECTION INTRAMUSCULAR; INTRAVENOUS
Status: DISCONTINUED | OUTPATIENT
Start: 2022-12-20 | End: 2022-12-20

## 2022-12-20 RX ADMIN — METHYLENE BLUE 50 MG: 5 INJECTION INTRAVENOUS at 08:12

## 2022-12-20 RX ADMIN — EPHEDRINE SULFATE 5 MG: 50 INJECTION INTRAVENOUS at 07:12

## 2022-12-20 RX ADMIN — LIDOCAINE HYDROCHLORIDE 100 MG: 20 INJECTION, SOLUTION EPIDURAL; INFILTRATION; INTRACAUDAL; PERINEURAL at 07:12

## 2022-12-20 RX ADMIN — KETOROLAC TROMETHAMINE 30 MG: 30 INJECTION, SOLUTION INTRAMUSCULAR at 08:12

## 2022-12-20 RX ADMIN — FENTANYL CITRATE 25 MCG: 50 INJECTION INTRAMUSCULAR; INTRAVENOUS at 08:12

## 2022-12-20 RX ADMIN — SODIUM CHLORIDE: 0.9 INJECTION, SOLUTION INTRAVENOUS at 07:12

## 2022-12-20 RX ADMIN — POLYETHYLENE GLYCOL 3350 17 G: 17 POWDER, FOR SOLUTION ORAL at 05:12

## 2022-12-20 RX ADMIN — Medication 50 MCG: at 07:12

## 2022-12-20 RX ADMIN — DEXAMETHASONE SODIUM PHOSPHATE 4 MG: 4 INJECTION INTRA-ARTICULAR; INTRALESIONAL; INTRAMUSCULAR; INTRAVENOUS; SOFT TISSUE at 07:12

## 2022-12-20 RX ADMIN — ONDANSETRON 4 MG: 2 INJECTION INTRAMUSCULAR; INTRAVENOUS at 08:12

## 2022-12-20 RX ADMIN — PSYLLIUM HUSK 1 PACKET: 3.4 POWDER ORAL at 05:12

## 2022-12-20 RX ADMIN — CEFAZOLIN 2 G: 330 INJECTION, POWDER, FOR SOLUTION INTRAMUSCULAR; INTRAVENOUS at 07:12

## 2022-12-20 RX ADMIN — EPHEDRINE SULFATE 10 MG: 50 INJECTION INTRAVENOUS at 07:12

## 2022-12-20 RX ADMIN — Medication 100 MCG: at 07:12

## 2022-12-20 RX ADMIN — MIDAZOLAM 2 MG: 1 INJECTION INTRAMUSCULAR; INTRAVENOUS at 07:12

## 2022-12-20 RX ADMIN — VENLAFAXINE HYDROCHLORIDE 150 MG: 75 CAPSULE, EXTENDED RELEASE ORAL at 05:12

## 2022-12-20 RX ADMIN — SODIUM CHLORIDE: 9 INJECTION, SOLUTION INTRAVENOUS at 08:12

## 2022-12-20 RX ADMIN — SODIUM CHLORIDE: 9 INJECTION, SOLUTION INTRAVENOUS at 09:12

## 2022-12-20 RX ADMIN — GLYCOPYRROLATE 0.2 MG: 0.2 INJECTION INTRAMUSCULAR; INTRAVENOUS at 07:12

## 2022-12-20 RX ADMIN — OXYCODONE 5 MG: 5 TABLET ORAL at 09:12

## 2022-12-20 RX ADMIN — Medication 100 MCG: at 08:12

## 2022-12-20 RX ADMIN — ACETAMINOPHEN 650 MG: 325 TABLET ORAL at 11:12

## 2022-12-20 RX ADMIN — PROPOFOL 200 MG: 10 INJECTION, EMULSION INTRAVENOUS at 07:12

## 2022-12-20 RX ADMIN — OXYBUTYNIN CHLORIDE 5 MG: 5 TABLET ORAL at 09:12

## 2022-12-20 NOTE — NURSING
Messaged Dr Davidson regarding patient home med effexor and also metamucil, patient would like to take meds at 6pm.     1547 Dr Davidson placed orders    1603 VTO Dr Davidson okay to modify above mediations to start tonight at 1800

## 2022-12-20 NOTE — OP NOTE
[facility]  Operative Note    Date: 12/20/2022    Pre-Op Diagnosis:  Stress Urinary Incontinence    Post-Op Diagnosis: same    Procedure(s) Performed:   1.  Retropubic midurethral sling placement  2.  Cystoscopy     Specimen(s): none    Staff Surgeon: Marcie Rea MD    Assistant Surgeon: Corinna Davidson MD    Anesthesia: General endotracheal anesthesia    Indications: Leticia Brown is a 67 y.o. female with bothersome JAJA.    Findings:   Sling placed uneventfully.  Cysto showed no bladder injury.  Ureteral orifices were noted to efflux briskly.    Estimated Blood Loss: minimal    Drains: 16 Fr wen catheter    Procedure in detail:  After the risks and benefits of the procedure were explained, consent was obtained.  Her urinalysis was confirmed to be negative for infection.  The patient was then taken to the operating room and placed under anesthesia. Pre-operative antibiotics were administered. She was placed in the dorsal lithotomy position and prepped and draped in a normal and sterile fashion.     A 16 Mosotho wen catheter was placed in a sterile fashion on the field and 10cc of water was placed in the balloon. The mid-urethra was identified and an allis clamp was placed on the vaginal epithelium just distal to this.  The overlying vaginal epithelium was infiltrated with 1% lidocaine.  A 1.5 cm midline incision was made on the vaginal epithelium using a 15 blade over the midurethra. The plane between the vaginal epithelium and jose-urethral fascia was developed to each side of the urethra bilaterally to the endopelvic fascia.  Two abdominal incisions about 0.5 cm in length were made on both sides of the midline just superior to the pubic symphsis. The bladder was then emptied, leaving the wen catheter in place. The left trocar was then advanced through the rectus fascia as the handle was moved cephalad. The trocar was passed just behind the pubic bone through the retropubic space  with the trocar exiting the vaginal incision.  After it was confirmed that there was no perforation of the vaginal epithelium, this same step was repeated on the contralateral side.     The wen was removed. A 17 Fr cystoscope with a 70 degree lens was then used to evaluate the urethra and bladder. It was confirmed there was no perforation of the urethra or bladder.  Methylene blue had been administered 15 minutes earlier and brisk blue efflux could be seen exiting both ureteral orifices bilaterally. The cystoscope was removed and the wen was then replaced.    The trocars were then connected to the mesh. The mesh was then pulled up through the suprapubic incisions.  A Vasquez scissors was placed behind the sling.  The plastic sheaths were then removed and the exposed mesh was cut at the level of the skin.  The trocars and connectors were then cut. The mesh was then tensioned appropriately by placing a Vasquez scissors between the urethra and the mesh with the screw of the mets at the level of the mesh. This allows for an appropriate tension free placement of the mesh.  These abdominal skin incisions were closed using Dermabond.    After irrigating the vaginal incision, the vaginal incision was closed in an interrupted vertical mattress fashion using 2-0 vicryl.  The vagina was packed with a bacitracin soaked Kerlix with K-Y Jelly.     Plan:  1. Place in 23 hour observation  2. Remove wen catheter and vaginal packing in the morning with voiding trial.  3. Pain control, ambulate tonight    Corinna Davidson MD    I was present for the entire case and agree with the above note.

## 2022-12-20 NOTE — TRANSFER OF CARE
"Anesthesia Transfer of Care Note    Patient: Leticia Brown    Procedure(s) Performed: Procedure(s) (LRB):  SLING, RETROPUBIC WITH SPARC (N/A)  CYSTOSCOPY (N/A)    Patient location: PACU    Anesthesia Type: general    Transport from OR: Transported from OR on 6-10 L/min O2 by face mask with adequate spontaneous ventilation    Post pain: adequate analgesia    Post assessment: no apparent anesthetic complications and tolerated procedure well    Post vital signs: stable    Level of consciousness: sedated    Nausea/Vomiting: no nausea/vomiting    Complications: none    Transfer of care protocol was followed      Last vitals:   Visit Vitals  BP (!) 118/58   Pulse 72   Temp 36.1 °C (97 °F) (Skin)   Resp 16   Ht 5' 4" (1.626 m)   Wt 77.1 kg (170 lb)   LMP  (LMP Unknown)   SpO2 99%   Breastfeeding No   BMI 29.18 kg/m²     "

## 2022-12-20 NOTE — NURSING TRANSFER
Nursing Transfer Note      12/20/2022     Reason patient is being transferred: Meets criteria    Transfer To: 509    Transfer via stretcher    Transfer with IV pole    Transported by Transport    Medicines sent: none    Any special needs or follow-up needed: none    Chart send with patient: Yes    Notified: significant other    Patient reassessed at: 12/20

## 2022-12-20 NOTE — H&P
Urology [facility]    HPI:  Leticia Brown is a 67 y.o. female with bothersome JAJA.     ROS:  Neg except per HPI    Past Medical History:   Diagnosis Date    Anxiety disorder, unspecified     BCC (basal cell carcinoma)     Cancer     Hyperlipemia     Sleep apnea        Past Surgical History:   Procedure Laterality Date    ADENOIDECTOMY      back sx      BUNIONECTOMY      COLONOSCOPY  2005    diverticulosis    COLONOSCOPY N/A 03/25/2022    Procedure: COLONOSCOPY  Covid Test Rapid;  Surgeon: Marshall King MD;  Location: [facility];  Service: Endoscopy;  Laterality: N/A;    NASAL SEPTOPLASTY N/A 11/05/2021    Procedure: SEPTOPLASTY, NOSE;  Surgeon: Irving Singh MD;  Location: [facility] OR [address] [place] [address];  Service: ENT;  Laterality: N/A;    NASAL TURBINATE REDUCTION Bilateral 11/05/2021    Procedure: REDUCTION, NASAL TURBINATE;  Surgeon: Irving Singh MD;  Location: [facility] OR [address] [place] [address];  Service: ENT;  Laterality: Bilateral;    NECK SURGERY      TONSILLECTOMY      TYMPANOPLASTY         Social History     Socioeconomic History    Marital status: Single   Tobacco Use    Smoking status: Never    Smokeless tobacco: Never   Substance and Sexual Activity    Alcohol use: Yes     Comment: once a month    Drug use: No     Social Determinants of Health     Financial Resource Strain: Low Risk     Difficulty of Paying Living Expenses: Not hard at all   Food Insecurity: No Food Insecurity    Worried About Running Out of Food in the Last Year: Never true    Ran Out of Food in the Last Year: Never true   Transportation Needs: No Transportation Needs    Lack of Transportation (Medical): No    Lack of Transportation (Non-Medical): No   Physical Activity: Insufficiently Active    Days of Exercise per Week: 1 day    Minutes of Exercise per Session: 10 min   Stress: Stress Concern Present    Feeling of Stress : To some extent   Social Connections: Unknown    Frequency of Communication with Friends and Family: Three times a week     "Frequency of Social Gatherings with Friends and Family: Once a week    Active Member of Clubs or Organizations: No    Attends Club or Organization Meetings: Never    Marital Status: Living with partner   Housing Stability: Low Risk     Unable to Pay for Housing in the Last Year: No    Number of Places Lived in the Last Year: 1    Unstable Housing in the Last Year: No       Family History   Problem Relation Age of Onset    Stroke Mother     Breast cancer Maternal Cousin        Review of patient's allergies indicates:   Allergen Reactions    Sulfa (sulfonamide antibiotics) Hives       No current facility-administered medications on file prior to encounter.     Current Outpatient Medications on File Prior to Encounter   Medication Sig Dispense Refill    atorvastatin (LIPITOR) 10 MG tablet TAKE 1 TABLET(10 MG) BY MOUTH EVERY EVENING 90 tablet 2    estradioL (ESTRACE) 1 MG tablet TAKE 1 TABLET(1 MG) BY MOUTH EVERY DAY 90 tablet 3    venlafaxine (EFFEXOR-XR) 150 MG Cp24 Take 1 capsule (150 mg total) by mouth once daily. 90 capsule 3    acyclovir (ZOVIRAX) 200 MG capsule Take 1 capsule (200 mg total) by mouth 3 (three) times daily. for 5 days 15 capsule 3    soft lens rinse,store solution (SALINE SOLUTION) Soln 1 application by Misc.(Non-Drug; Combo Route) route 2 (two) times a day. 710 mL 3       Anticoagulation:  No    Physical Exam:  Estimated body mass index is 29.18 kg/m² as calculated from the following:    Height as of this encounter: 5' 4" (1.626 m).    Weight as of this encounter: 77.1 kg (170 lb).    General: No acute distress, well developed. AAOx3  Head: Normocephalic, Atraumatic  Eyes: Extra-occular movements intact, No discharge  Neck: supple, symmetrical, trachea midline  Lungs: normal respiratory effort, no respiratory distress, no wheezes  CV: regular rate, 2+ pulses  Abdomen: soft, non-tender, non-distended, no organomegaly  MSK: no edema, no deformities, normal ROM  Skin: skin color, texture, turgor " normal.  Neurologic: no focal deficits, sensation intact    Labs:    Urine dipstick today - Urine dipstick - negative for all components.      Lab Results   Component Value Date    WBC 4.91 09/21/2022    HGB 13.0 09/21/2022    HCT 42.2 09/21/2022    MCV 96 09/21/2022     09/21/2022           BMP  Lab Results   Component Value Date     09/21/2022    K 3.7 09/21/2022     09/21/2022    CO2 29 09/21/2022    BUN 16 09/21/2022    CREATININE 0.7 11/09/2022    CALCIUM 8.9 09/21/2022    ANIONGAP 8 09/21/2022    EGFRNORACEVR >60.0 11/09/2022         Assessment: eLticia Brown is a 67 y.o. female with bothersome JAJA.    Plan:     1. To OR for retropubic mid-urethral sling, and cystoscopy.   2. Consents signed   3. I have explained the risk, benefits, and alternatives of the procedure in detail. The patient voices understanding and all questions have been answered. The patient agrees to proceed as planned.     Corinna Davidson MD    Patient seen in holding.  No changes in clinical condition.  Proceed with planned procedure.

## 2022-12-20 NOTE — PLAN OF CARE
Pt IV flushed. Pt does not c/o pain at this time. Jarquin in place and draining. Incision sites CDI

## 2022-12-21 VITALS
BODY MASS INDEX: 29.02 KG/M2 | SYSTOLIC BLOOD PRESSURE: 131 MMHG | HEIGHT: 64 IN | RESPIRATION RATE: 19 BRPM | HEART RATE: 68 BPM | WEIGHT: 170 LBS | TEMPERATURE: 97 F | OXYGEN SATURATION: 99 % | DIASTOLIC BLOOD PRESSURE: 67 MMHG

## 2022-12-21 LAB
BASOPHILS # BLD AUTO: 0.02 K/UL (ref 0–0.2)
BASOPHILS NFR BLD: 0.2 % (ref 0–1.9)
DIFFERENTIAL METHOD: ABNORMAL
EOSINOPHIL # BLD AUTO: 0 K/UL (ref 0–0.5)
EOSINOPHIL NFR BLD: 0.2 % (ref 0–8)
ERYTHROCYTE [DISTWIDTH] IN BLOOD BY AUTOMATED COUNT: 13.1 % (ref 11.5–14.5)
HCT VFR BLD AUTO: 35.1 % (ref 37–48.5)
HGB BLD-MCNC: 11.2 G/DL (ref 12–16)
IMM GRANULOCYTES # BLD AUTO: 0.02 K/UL (ref 0–0.04)
IMM GRANULOCYTES NFR BLD AUTO: 0.2 % (ref 0–0.5)
LYMPHOCYTES # BLD AUTO: 1.7 K/UL (ref 1–4.8)
LYMPHOCYTES NFR BLD: 17.2 % (ref 18–48)
MCH RBC QN AUTO: 29.9 PG (ref 27–31)
MCHC RBC AUTO-ENTMCNC: 31.9 G/DL (ref 32–36)
MCV RBC AUTO: 94 FL (ref 82–98)
MONOCYTES # BLD AUTO: 0.8 K/UL (ref 0.3–1)
MONOCYTES NFR BLD: 7.8 % (ref 4–15)
NEUTROPHILS # BLD AUTO: 7.3 K/UL (ref 1.8–7.7)
NEUTROPHILS NFR BLD: 74.4 % (ref 38–73)
NRBC BLD-RTO: 0 /100 WBC
PLATELET # BLD AUTO: 238 K/UL (ref 150–450)
PMV BLD AUTO: 11 FL (ref 9.2–12.9)
RBC # BLD AUTO: 3.75 M/UL (ref 4–5.4)
WBC # BLD AUTO: 9.75 K/UL (ref 3.9–12.7)

## 2022-12-21 PROCEDURE — 85025 COMPLETE CBC W/AUTO DIFF WBC: CPT | Performed by: STUDENT IN AN ORGANIZED HEALTH CARE EDUCATION/TRAINING PROGRAM

## 2022-12-21 PROCEDURE — 25000242 PHARM REV CODE 250 ALT 637 W/ HCPCS: Performed by: UROLOGY

## 2022-12-21 PROCEDURE — 25000003 PHARM REV CODE 250: Performed by: UROLOGY

## 2022-12-21 PROCEDURE — 25000003 PHARM REV CODE 250: Performed by: STUDENT IN AN ORGANIZED HEALTH CARE EDUCATION/TRAINING PROGRAM

## 2022-12-21 PROCEDURE — 36415 COLL VENOUS BLD VENIPUNCTURE: CPT | Performed by: STUDENT IN AN ORGANIZED HEALTH CARE EDUCATION/TRAINING PROGRAM

## 2022-12-21 RX ORDER — POLYETHYLENE GLYCOL 3350 17 G/17G
17 POWDER, FOR SOLUTION ORAL DAILY
Qty: 238 G | Refills: 0 | Status: SHIPPED | OUTPATIENT
Start: 2022-12-21 | End: 2023-01-04

## 2022-12-21 RX ORDER — IBUPROFEN 600 MG/1
600 TABLET ORAL 3 TIMES DAILY
Qty: 30 TABLET | Refills: 0 | Status: SHIPPED | OUTPATIENT
Start: 2022-12-21 | End: 2022-12-31

## 2022-12-21 RX ORDER — ACETAMINOPHEN 325 MG/1
325 TABLET ORAL EVERY 6 HOURS PRN
Qty: 40 TABLET | Refills: 0 | Status: SHIPPED | OUTPATIENT
Start: 2022-12-21 | End: 2022-12-31

## 2022-12-21 RX ORDER — OXYCODONE HYDROCHLORIDE 5 MG/1
5 TABLET ORAL EVERY 4 HOURS PRN
Qty: 5 TABLET | Refills: 0 | Status: SHIPPED | OUTPATIENT
Start: 2022-12-21 | End: 2023-01-05

## 2022-12-21 RX ORDER — CIPROFLOXACIN 500 MG/1
500 TABLET ORAL 2 TIMES DAILY
Qty: 4 TABLET | Refills: 0 | Status: SHIPPED | OUTPATIENT
Start: 2022-12-21 | End: 2022-12-23

## 2022-12-21 RX ADMIN — PSYLLIUM HUSK 1 PACKET: 3.4 POWDER ORAL at 08:12

## 2022-12-21 RX ADMIN — VENLAFAXINE HYDROCHLORIDE 150 MG: 75 CAPSULE, EXTENDED RELEASE ORAL at 08:12

## 2022-12-21 RX ADMIN — SODIUM CHLORIDE: 9 INJECTION, SOLUTION INTRAVENOUS at 06:12

## 2022-12-21 NOTE — PLAN OF CARE
[facility]  Initial Discharge Assessment       Primary Care Provider: Timo Pike MD    Admission Diagnosis: Stress incontinence [N39.3]    Admission Date: 12/20/2022  Expected Discharge Date: 12/21/2022    Discharge Barriers Identified: (P) None    Payor: MEDICARE / Plan: MEDICARE PART A & B / Product Type: Government /     Extended Emergency Contact Information  Primary Emergency Contact: Sarthak Herman  Address: [address] [place] [address]           [place] [address] [place]  Mobile Phone: [phone]  Relation: Significant other    Discharge Plan A: (P) Home with family         [id number] - [place] - [address] AT [facility] [address]  [address]  [place] [address]  Phone: [phone] Fax: [phone]    [facility] #[id number] - [place], [place] - [address] [place] [address] AT [facility] OF [address]   [address] [place] [address]  [place] [address]  Phone: [phone] Fax: [phone]      Initial Assessment (most recent)       Adult Discharge Assessment - 12/21/22 1031          Discharge Assessment    Assessment Type Discharge Planning Assessment (P)      Confirmed/corrected address, phone number and insurance Yes (P)      Confirmed Demographics Correct on Facesheet (P)      Source of Information patient (P)      Communicated HESHAM with patient/caregiver Yes (P)      People in Home significant other (P)      Do you expect to return to your current living situation? Yes (P)      Do you have help at home or someone to help you manage your care at home? Yes (P)      Prior to hospitilization cognitive status: Alert/Oriented (P)      Current cognitive status: Alert/Oriented (P)      Equipment Currently Used at Home none (P)      Readmission within 30 days? No (P)      Patient currently being followed by outpatient case management? No (P)      Do you currently have service(s) that help you manage your care at home? No (P)      Do you have  prescription coverage? Yes (P)      How do you get to doctors appointments? car, drives self;family or friend will provide (P)      Are you on dialysis? No (P)      Do you take coumadin? No (P)      Discharge Plan A Home with family (P)      DME Needed Upon Discharge  none (P)      Discharge Plan discussed with: Patient (P)      Discharge Barriers Identified None (P)                        SW completed discharge planning assessment with the patient at bedside. SW verified demographic information listed on the pt.'s Face sheet. Pt reports living in a single story home with her significant other (Sarthak), who she plans to help manage her care and provide transportation upon discharge. Pt denies previous HH/Dialysis/Post Acute-Services upon discharge.     Sera Lopez LMSW  Case Management   [facility]   Ext. [phone]

## 2022-12-21 NOTE — ANESTHESIA RELEASE NOTE
"Anesthesia Release from PACU Note    Patient: Leticia Brown    Procedure(s) Performed: Procedure(s) (LRB):  SLING, RETROPUBIC WITH SPARC (N/A)  CYSTOSCOPY (N/A)    Anesthesia type: general    Post pain: Adequate analgesia    Post assessment: no apparent anesthetic complications and tolerated procedure well    Last Vitals:   Visit Vitals  /67 (Patient Position: Lying)   Pulse 68   Temp 36.3 °C (97.4 °F) (Oral)   Resp 19   Ht 5' 4" (1.626 m)   Wt 77.1 kg (170 lb)   LMP  (LMP Unknown)   SpO2 99%   Breastfeeding No   BMI 29.18 kg/m²       Post vital signs: stable    Level of consciousness: awake and alert     Nausea/Vomiting: no nausea/no vomiting    Complications: none    Airway Patency: patent    Respiratory: unassisted, spontaneous ventilation, room air    Cardiovascular: stable and blood pressure at baseline    Hydration: euvolemic  "

## 2022-12-21 NOTE — SUBJECTIVE & OBJECTIVE
Interval History: NAEON. S/p midurethral sling yesterday. CBC pending this morning. Walked. Pain controlled. Tolerating diet.       Objective:     Temp:  [97 °F (36.1 °C)-98.3 °F (36.8 °C)] 98.3 °F (36.8 °C)  Pulse:  [65-80] 72  Resp:  [12-20] 18  SpO2:  [95 %-100 %] 95 %  BP: ()/(51-59) 93/52     Body mass index is 29.18 kg/m².           Drains       Drain  Duration                  Urethral Catheter 12/20/22 0743 Straight-tip;Non-latex 16 Fr. <1 day                    Physical Exam  Constitutional:       General: She is not in acute distress.  HENT:      Head: Normocephalic.   Cardiovascular:      Rate and Rhythm: Normal rate.   Pulmonary:      Effort: Pulmonary effort is normal.   Abdominal:      General: There is no distension.      Palpations: Abdomen is soft.      Tenderness: There is no abdominal tenderness.   Genitourinary:     Comments: Jarquin draining clear yellow urine   Musculoskeletal:         General: Normal range of motion.   Skin:     General: Skin is warm and dry.   Neurological:      Mental Status: She is alert.      Coordination: Coordination normal.   Psychiatric:         Behavior: Behavior normal.       Significant Labs:    BMP:  No results for input(s): NA, K, CL, CO2, BUN, CREATININE, LABGLOM, GLUCOSE, CALCIUM in the last 168 hours.    CBC:   No results for input(s): WBC, HGB, HCT, PLT in the last 168 hours.    All pertinent labs results from the past 24 hours have been reviewed.    Significant Imaging:  All pertinent imaging results/findings from the past 24 hours have been reviewed.

## 2022-12-21 NOTE — ANESTHESIA POSTPROCEDURE EVALUATION
Anesthesia Post Evaluation    Patient: Leticia Brown    Procedure(s) Performed: Procedure(s) (LRB):  SLING, RETROPUBIC WITH SPARC (N/A)  CYSTOSCOPY (N/A)    Final Anesthesia Type: general      Patient location during evaluation: PACU  Patient participation: Yes- Able to Participate  Level of consciousness: awake and alert  Post-procedure vital signs: reviewed and stable  Pain management: adequate  Airway patency: patent    PONV status at discharge: No PONV  Anesthetic complications: no      Cardiovascular status: hemodynamically stable  Respiratory status: unassisted, spontaneous ventilation and room air  Hydration status: euvolemic  Follow-up not needed.          Vitals Value Taken Time   /67 12/21/22 0748   Temp 36.3 °C (97.4 °F) 12/21/22 0748   Pulse 68 12/21/22 0748   Resp 19 12/21/22 0748   SpO2 99 % 12/21/22 0748         Event Time   Out of Recovery 09:30:00         Pain/Jaycob Score: Pain Rating Prior to Med Admin: 5 (12/20/2022 11:42 PM)  Pain Rating Post Med Admin: 2 (12/20/2022 12:30 PM)  Jaycob Score: 10 (12/20/2022  9:30 AM)

## 2022-12-21 NOTE — DISCHARGE SUMMARY
[facility]  Urology  Discharge Summary      Patient Name: Leticia Brown  MRN: [id number]  Admission Date: 12/20/2022  Hospital Length of Stay: 0 days  Discharge Date and Time:  12/21/2022 6:13 AM  Attending Physician: Marcie Rea MD   Discharging Provider: LAWRENCE MESSER MD  Primary Care Physician: Timo Pike MD    HPI:   No notes on file     Procedure(s) (LRB):  SLING, RETROPUBIC WITH SPARC (N/A)  CYSTOSCOPY (N/A)     Indwelling Lines/Drains at time of discharge:   Lines/Drains/Airways       Drain  Duration                  Urethral Catheter 12/20/22 0743 Straight-tip;Non-latex 16 Fr. <1 day                    Hospital Course Patient was brought to the hospital for the above procedure.  The patient tolerated it well.  Post op, the patient's diet was advanced, their pain was controlled, and the patient was ambulating without problem.  They passed a voiding trial. The patient will follow up in 2 weeks with Dr. Rea.  The patient was given prescriptions for ibuprofen, tylenol, miralax and antibiotics.      Medications and instructions as below.  For more thorough information, please refer to the hospital record and operative report.    Consults: none    Significant Diagnostic Studies:     Pending Diagnostic Studies:       None            Final Active Diagnoses:    Diagnosis Date Noted POA    PRINCIPAL PROBLEM:  Stress incontinence [N39.3] 12/10/2012 Unknown      Problems Resolved During this Admission:       Discharged Condition: good    Disposition: Home or Self Care    Follow Up:    Patient Instructions:      Diet Adult Regular     Lifting restrictions     Notify your health care provider if you experience any of the following:  temperature >100.4     Notify your health care provider if you experience any of the following:  persistent nausea and vomiting or diarrhea     Notify your health care provider if you experience any of the following:  severe uncontrolled pain     Notify your health  care provider if you experience any of the following:  redness, tenderness, or signs of infection (pain, swelling, redness, odor or green/yellow discharge around incision site)     Notify your health care provider if you experience any of the following:  difficulty breathing or increased cough     Notify your health care provider if you experience any of the following:  severe persistent headache     Notify your health care provider if you experience any of the following:  worsening rash     Notify your health care provider if you experience any of the following:  persistent dizziness, light-headedness, or visual disturbances     Notify your health care provider if you experience any of the following:  increased confusion or weakness     Notify your health care provider if you experience any of the following:     Activity as tolerated     Medications:  Reconciled Home Medications:      Medication List        START taking these medications      acetaminophen 325 MG tablet  Commonly known as: TYLENOL  Take 1 tablet (325 mg total) by mouth every 6 (six) hours as needed for Pain.     ciprofloxacin HCl 500 MG tablet  Commonly known as: CIPRO  Take 1 tablet (500 mg total) by mouth 2 (two) times daily. for 2 days     ibuprofen 600 MG tablet  Commonly known as: ADVIL,MOTRIN  Take 1 tablet (600 mg total) by mouth 3 (three) times daily. for 10 days     polyethylene glycol 17 gram Pwpk  Commonly known as: GLYCOLAX  Take 17 g by mouth once daily. for 14 days            CONTINUE taking these medications      acyclovir 200 MG capsule  Commonly known as: ZOVIRAX  Take 1 capsule (200 mg total) by mouth 3 (three) times daily. for 5 days     atorvastatin 10 MG tablet  Commonly known as: LIPITOR  TAKE 1 TABLET(10 MG) BY MOUTH EVERY EVENING     estradioL 1 MG tablet  Commonly known as: ESTRACE  TAKE 1 TABLET(1 MG) BY MOUTH EVERY DAY     SALINE SOLUTION Soln  Generic drug: soft lens rinse,store solution  1 application by Misc.(Non-Drug;  Combo Route) route 2 (two) times a day.     venlafaxine 150 MG Cp24  Commonly known as: EFFEXOR-XR  Take 1 capsule (150 mg total) by mouth once daily.              Time spent on the discharge of patient: 15 minutes    LAWRENCE MESSER MD  Urology  [facility] - Surgery    As above.

## 2022-12-21 NOTE — DISCHARGE INSTRUCTIONS
Post Op Instructions for Midurethral Sling Surgery    RECOVERY ROOM  You will likely awaken with a vaginal packing in place.  This will feel like a large tampon.  If you have a catheter and feel the need to urinate, relax and let the urine flow.  If you do not have a catheter and feel the need to urinate, please let the recovery room nurse know so they can either assist you to the bathroom or provide you with a bedpan.  You may see blue or green urine after surgery.  This is from a dye that is given to you during surgery and will clear within 24 hours.      ACTIVITY  The first six weeks is a critical time period for wound healing.  We depend on the scar tissue to grow into the sling to provide the necessary support to treat the stress urinary incontinence.    After receiving an anesthetic you may feel sleepy or nauseated.  It is best to have little activity for the first 24-48 hours.  Gradually increase activity.  No heavy lifting (nothing greater than 10 pounds or a gallon of milk) for 6 weeks.  Pelvic rest (no sexual intercourse, douching or tampons) for 6 weeks.  It is OK to walk around the house.  Avoid riding a bicycle or putting similar pressure over this area.  No strenuous workouts.    Do not drive for the first 48 hours or if you are taking narcotic pain medication.  Before driving, be sure you can press the brakes without difficulty or pain.    WOUND CARE INSTRUCTIONS  You may have small incisions that were closed with a medical grade superglue.  You may wash these and pat them dry after 24 hours.  To protect the sling material from infection, do not immerse yourself in water, (i.e. no tub baths, swimming or hot tubbing) for 6 weeks.  You may shower.    It is normal to have some light bleeding which should gradually resolve over the next week.  This will look like a light period.    DIET  In the first 24 hours, it is common to experience some nausea, so take clear liquids.  Once the nausea has resolved, a  soft diet is recommended with a gradual increase to your normal diet.    MEDICATIONS  Pain medication should be taken on an as needed basis.  If the narcotic is too much, over-the-counter pain relievers may be taken.  However, do not take additional Tylenol/acetaminophen while taking narcotic pain medication as this can lead to an overdose of the Tylenol/acetaminophen.  Antibiotics, if ordered, should be taken as directed until the prescription has been completed.    A stool softener (Colace or docusate sodium) is recommended to maintain soft stools after surgery.  If you do not have a bowel movement within 36-48 hours of surgery, take 2 tablespoons of Milk of Magnesia.  If there is still no bowel movement by the next day, take ½ bottle of Magnesium Citrate (refrigerate and drink with a straw.  This is available over-the-counter.)    WHEN TO CALL THE DOCTOR:  For heavy bleeding (vaginal discharge like a light period is expected for the 3rd week)  Redness or swelling around the incision.  Fever over 101oF (38.5oC.)  Significant vaginal drainage.   Persistent pain unrelieved by the pain medication.  Leg swelling.  Shortness of breath.  Burning with urination.  Inability to urinate.  Abdominal pain not improving day by day.    \    FOR EMERGENCIES  If any unusual problems, questions or concerns, please contact your physician or the resident on call at [phone] after hours or during office hours, [phone].

## 2022-12-21 NOTE — PROGRESS NOTES
[facility]  Urology  Progress Note    Patient Name: Leticia Brown  MRN: [id number]  Admission Date: 12/20/2022  Hospital Length of Stay: 0 days  Code Status: Full Code   Attending Provider: Marcie Rea MD   Primary Care Physician: Timo Pike MD    Subjective:     HPI:  No notes on file    Interval History: NAEON. S/p midurethral sling yesterday. CBC pending this morning. Walked. Pain controlled. Tolerating diet.       Objective:     Temp:  [97 °F (36.1 °C)-98.3 °F (36.8 °C)] 98.3 °F (36.8 °C)  Pulse:  [65-80] 72  Resp:  [12-20] 18  SpO2:  [95 %-100 %] 95 %  BP: ()/(51-59) 93/52     Body mass index is 29.18 kg/m².           Drains       Drain  Duration                  Urethral Catheter 12/20/22 0743 Straight-tip;Non-latex 16 Fr. <1 day                    Physical Exam  Constitutional:       General: She is not in acute distress.  HENT:      Head: Normocephalic.   Cardiovascular:      Rate and Rhythm: Normal rate.   Pulmonary:      Effort: Pulmonary effort is normal.   Abdominal:      General: There is no distension.      Palpations: Abdomen is soft.      Tenderness: There is no abdominal tenderness.   Genitourinary:     Comments: Jarquin draining clear yellow urine   Musculoskeletal:         General: Normal range of motion.   Skin:     General: Skin is warm and dry.   Neurological:      Mental Status: She is alert.      Coordination: Coordination normal.   Psychiatric:         Behavior: Behavior normal.       Significant Labs:    BMP:  No results for input(s): NA, K, CL, CO2, BUN, CREATININE, LABGLOM, GLUCOSE, CALCIUM in the last 168 hours.    CBC:   No results for input(s): WBC, HGB, HCT, PLT in the last 168 hours.    All pertinent labs results from the past 24 hours have been reviewed.    Significant Imaging:  All pertinent imaging results/findings from the past 24 hours have been reviewed.                    Assessment/Plan:     * Stress incontinence  Leticia Brown is an 67 y.o. female  that is s/p mid urethral sling on POD1. Doing well post-op.    - Pain - controlled  - Diet - regular  - Jarquin catheter and vaginal packing removed this morning.  Voiding Trial today    - Dispo: Discharge after VT           VTE Risk Mitigation (From admission, onward)    None          LAWRENCE MESSER MD  Urology  [facility] - Surgery

## 2022-12-21 NOTE — ASSESSMENT & PLAN NOTE
Leticia Arzolaro is an 67 y.o. female that is s/p mid urethral sling on POD1. Doing well post-op.    - Pain - controlled  - Diet - regular  - Jarquin catheter and vaginal packing removed this morning.  Voiding Trial today    - Dispo: Discharge after VT

## 2022-12-21 NOTE — PLAN OF CARE
[facility]  Discharge Final Note    Primary Care Provider: Timo Pike MD    Expected Discharge Date: 12/21/2022    Final Discharge Note (most recent)       Final Note - 12/21/22 0951          Final Note    Assessment Type Final Discharge Note     Anticipated Discharge Disposition Home or Self Care     What phone number can be called within the next 1-3 days to see how you are doing after discharge? --   [phone]    Hospital Resources/Appts/Education Provided Appointments scheduled and added to AVS                     Important Message from Medicare             Contact Info       Marcie Rea MD   Specialty: Urology    [address]  [place] [address]   Phone: [phone]       Next Steps: Follow up in 2 week(s)          Future Appointments   Date Time Provider Department Center   3/22/2023  9:00 AM Timo Pike MD [facility]     Patient discharged home to care of family on 12/21/22.

## 2023-01-05 ENCOUNTER — OFFICE VISIT (OUTPATIENT)
Dept: UROLOGY | Facility: CLINIC | Age: 68
End: 2023-01-05
Payer: MEDICARE

## 2023-01-05 DIAGNOSIS — Z98.890 POST-OPERATIVE STATE: Primary | ICD-10-CM

## 2023-01-05 PROCEDURE — 99024 PR POST-OP FOLLOW-UP VISIT: ICD-10-PCS | Mod: 95,POP,, | Performed by: UROLOGY

## 2023-01-05 PROCEDURE — 99024 POSTOP FOLLOW-UP VISIT: CPT | Mod: 95,POP,, | Performed by: UROLOGY

## 2023-01-05 NOTE — PROGRESS NOTES
The patient location is: [place]  The chief complaint leading to consultation is: follow up after sling placement    Visit type: audiovisual    Time with patient:  15 minutes of total time spent on the encounter, which includes face to face time and non-face to face time preparing to see the patient (eg, review of tests), obtaining and/or reviewing separately obtained history, documenting clinical information in the electronic or other health record, independently interpreting results (not separately reported) and communicating results to the patient/family/caregiver, or care coordination (not separately reported).     Each patient to whom he or she provides medical services by telemedicine is:  (1) informed of the relationship between the physician and patient and the respective role of any other health care provider with respect to management of the patient; and (2) notified that he or she may decline to receive medical services by telemedicine and may withdraw from such care at any time.    CHIEF COMPLAINT:    Mrs. Brown is a 67 y.o. female presenting for a post op visit, following retropubic midurethal sling on 12/20/2022.    PRESENTING ILLNESS:    Leticia Brown is a 67 y.o. female who returns stating that she is doing very well. She only wears a pad when she goes out of the house.  At home she does not wear a pad.  She has no discharge and no pain.  She denies urgency, and she does not wake up at night to urinate.  She is very pleased.        Past Surgical History:   Procedure Laterality Date    ADENOIDECTOMY      back sx      BUNIONECTOMY      COLONOSCOPY  2005    diverticulosis    COLONOSCOPY N/A 03/25/2022    Procedure: COLONOSCOPY  Covid Test Rapid;  Surgeon: Marshall King MD;  Location: [facility];  Service: Endoscopy;  Laterality: N/A;    CREATION OF URETHRAL SLING USING POLYPROPYLENE TAPE BY RETROPUBIC APPROACH N/A 12/20/2022    Procedure: SLING, RETROPUBIC WITH SPARC;  Surgeon: Marcie ARANGO  MD Álvaro;  [facility];  Service: Urology;  Laterality: N/A;    CYSTOSCOPY N/A 2022    Procedure: CYSTOSCOPY;  Surgeon: Marcie Rea MD;  [facility];  Service: Urology;  Laterality: N/A;    NASAL SEPTOPLASTY N/A 2021    Procedure: SEPTOPLASTY, NOSE;  Surgeon: Irving Singh MD;  [facility];  Service: ENT;  Laterality: N/A;    NASAL TURBINATE REDUCTION Bilateral 2021    Procedure: REDUCTION, NASAL TURBINATE;  Surgeon: Irving Singh MD;  [facility];  Service: ENT;  Laterality: Bilateral;    NECK SURGERY      TONSILLECTOMY      TYMPANOPLASTY         Allergies:  Sulfa (sulfonamide antibiotics)    Medications:  Outpatient Encounter Medications as of 2023   Medication Sig Dispense Refill    [] acetaminophen (TYLENOL) 325 MG tablet Take 1 tablet (325 mg total) by mouth every 6 (six) hours as needed for Pain. 40 tablet 0    acyclovir (ZOVIRAX) 200 MG capsule Take 1 capsule (200 mg total) by mouth 3 (three) times daily. for 5 days 15 capsule 3    atorvastatin (LIPITOR) 10 MG tablet TAKE 1 TABLET(10 MG) BY MOUTH EVERY EVENING 90 tablet 2    [] ciprofloxacin HCl (CIPRO) 500 MG tablet Take 1 tablet (500 mg total) by mouth 2 (two) times daily. for 2 days 4 tablet 0    estradioL (ESTRACE) 1 MG tablet TAKE 1 TABLET(1 MG) BY MOUTH EVERY DAY 90 tablet 3    [] ibuprofen (ADVIL,MOTRIN) 600 MG tablet Take 1 tablet (600 mg total) by mouth 3 (three) times daily. for 10 days 30 tablet 0    oxyCODONE (ROXICODONE) 5 MG immediate release tablet Take 1 tablet (5 mg total) by mouth every 4 (four) hours as needed for Pain. 5 tablet 0    [] polyethylene glycol (GLYCOLAX) 17 gram/dose powder Use cap to measure 17 g, then mix with liquid and drink by mouth once daily. for 14 days 238 g 0    soft lens rinse,store solution (SALINE SOLUTION) Soln 1 application by Misc.(Non-Drug; Combo Route) route 2 (two) times a day. 710 mL 3     venlafaxine (EFFEXOR-XR) 150 MG Cp24 Take 1 capsule (150 mg total) by mouth once daily. 90 capsule 3     No facility-administered encounter medications on file as of 1/5/2023.         PHYSICAL EXAMINATION:    The patient generally appears in good health, is appropriately interactive, and is in no apparent distress.    Mental: Cooperative with normal affect.    Chest:  normal inspiratory effort.      IMPRESSION:    Post op state    PLAN:    1.  Reiterated the post op limitations  2.  May see sutures closer to the 6 week follow up  3.  Follow up for the 6 week post op

## 2023-02-02 ENCOUNTER — OFFICE VISIT (OUTPATIENT)
Dept: UROLOGY | Facility: CLINIC | Age: 68
End: 2023-02-02
Payer: MEDICARE

## 2023-02-02 VITALS
BODY MASS INDEX: 30.09 KG/M2 | SYSTOLIC BLOOD PRESSURE: 122 MMHG | HEART RATE: 71 BPM | DIASTOLIC BLOOD PRESSURE: 77 MMHG | HEIGHT: 64 IN | WEIGHT: 176.25 LBS

## 2023-02-02 DIAGNOSIS — Z98.890 POST-OPERATIVE STATE: Primary | ICD-10-CM

## 2023-02-02 PROCEDURE — 99024 POSTOP FOLLOW-UP VISIT: CPT | Mod: POP,,, | Performed by: UROLOGY

## 2023-02-02 PROCEDURE — 99213 OFFICE O/P EST LOW 20 MIN: CPT | Mod: PBBFAC | Performed by: UROLOGY

## 2023-02-02 PROCEDURE — 99999 PR PBB SHADOW E&M-EST. PATIENT-LVL III: CPT | Mod: PBBFAC,,, | Performed by: UROLOGY

## 2023-02-02 PROCEDURE — 99024 PR POST-OP FOLLOW-UP VISIT: ICD-10-PCS | Mod: POP,,, | Performed by: UROLOGY

## 2023-02-02 PROCEDURE — 99999 PR PBB SHADOW E&M-EST. PATIENT-LVL III: ICD-10-PCS | Mod: PBBFAC,,, | Performed by: UROLOGY

## 2023-02-02 RX ORDER — ACETAZOLAMIDE 125 MG/1
125 TABLET ORAL
COMMUNITY

## 2023-02-02 NOTE — PROGRESS NOTES
CHIEF COMPLAINT:    Mrs. Brown is a 67 y.o. female presenting for a post op visit, following retropubic sling placement on 12/20/2022.    PRESENTING ILLNESS:    Leticia Brown is a 67 y.o. female who returns for follow up.  She states she is doing very well.  She has no vaginal discharge, no problems with urgency, except if she sleeps 12-13 hours in bed.  Then she has urgency may have urge incontinence. She is very pleased that she had the surgery done.  She feels like she empties.        Past Surgical History:   Procedure Laterality Date    ADENOIDECTOMY      back sx      BUNIONECTOMY      COLONOSCOPY  2005    diverticulosis    COLONOSCOPY N/A 03/25/2022    Procedure: COLONOSCOPY  Covid Test Rapid;  Surgeon: Marshall King MD;  Location: [facility];  Service: Endoscopy;  Laterality: N/A;    CREATION OF URETHRAL SLING USING POLYPROPYLENE TAPE BY RETROPUBIC APPROACH N/A 12/20/2022    Procedure: SLING, RETROPUBIC WITH SPARC;  Surgeon: Marcie Rea MD;  Location: [address];  Service: Urology;  Laterality: N/A;    CYSTOSCOPY N/A 12/20/2022    Procedure: CYSTOSCOPY;  Surgeon: Marcie Rea MD;  Location: [facility] OR [address] [place] [address];  Service: Urology;  Laterality: N/A;    NASAL SEPTOPLASTY N/A 11/05/2021    Procedure: SEPTOPLASTY, NOSE;  Surgeon: Irving Singh MD;  Location: [facility] OR [address] [place] [address];  Service: ENT;  Laterality: N/A;    NASAL TURBINATE REDUCTION Bilateral 11/05/2021    Procedure: REDUCTION, NASAL TURBINATE;  Surgeon: Irving Singh MD;  Location: [address];  Service: ENT;  Laterality: Bilateral;    NECK SURGERY      TONSILLECTOMY      TYMPANOPLASTY         Allergies:  Sulfa (sulfonamide antibiotics)    Medications:  Outpatient Encounter Medications as of 2/2/2023   Medication Sig Dispense Refill    acyclovir (ZOVIRAX) 200 MG capsule Take 1 capsule (200 mg total) by mouth 3 (three) times daily. for 5 days 15 capsule 3    atorvastatin (LIPITOR) 10 MG tablet TAKE 1 TABLET(10 MG) BY MOUTH  EVERY EVENING 90 tablet 2    estradioL (ESTRACE) 1 MG tablet TAKE 1 TABLET(1 MG) BY MOUTH EVERY DAY 90 tablet 3    soft lens rinse,store solution (SALINE SOLUTION) Soln 1 application by Misc.(Non-Drug; Combo Route) route 2 (two) times a day. 710 mL 3    venlafaxine (EFFEXOR-XR) 150 MG Cp24 Take 1 capsule (150 mg total) by mouth once daily. 90 capsule 3     No facility-administered encounter medications on file as of 2/2/2023.         PHYSICAL EXAMINATION:    The patient generally appears in good health, is appropriately interactive, and is in no apparent distress.    Skin: No lesions.    Mental: Cooperative with normal affect.    Neuro: Grossly intact.    HEENT: Normal. No evidence of lymphadenopathy.    Chest:  normal inspiratory effort.    Extremities: No clubbing, cyanosis, or edema    Normal external female genitalia  Urethral meatus is normal  Urethra and bladder are nontender to bimanual exam  Well supported anteriorly and posteriorly   Uterus and cervix are normal  No adnexal masses    IMPRESSION:    Post op state    PLAN:    1.  Lifted the post op limitations. However, warned her about not skiing and falling for another 6 weeks.   2.  Follow up in 3 months

## 2023-03-22 ENCOUNTER — OFFICE VISIT (OUTPATIENT)
Dept: OTOLARYNGOLOGY | Facility: CLINIC | Age: 68
End: 2023-03-22
Payer: MEDICARE

## 2023-03-22 ENCOUNTER — TELEPHONE (OUTPATIENT)
Dept: FAMILY MEDICINE | Facility: CLINIC | Age: 68
End: 2023-03-22
Payer: MEDICARE

## 2023-03-22 ENCOUNTER — OFFICE VISIT (OUTPATIENT)
Dept: FAMILY MEDICINE | Facility: CLINIC | Age: 68
End: 2023-03-22
Payer: MEDICARE

## 2023-03-22 ENCOUNTER — PATIENT MESSAGE (OUTPATIENT)
Dept: FAMILY MEDICINE | Facility: CLINIC | Age: 68
End: 2023-03-22
Payer: MEDICARE

## 2023-03-22 VITALS
SYSTOLIC BLOOD PRESSURE: 100 MMHG | HEART RATE: 90 BPM | DIASTOLIC BLOOD PRESSURE: 74 MMHG | OXYGEN SATURATION: 97 % | HEIGHT: 64 IN | BODY MASS INDEX: 29.77 KG/M2 | WEIGHT: 174.38 LBS

## 2023-03-22 VITALS
BODY MASS INDEX: 27.48 KG/M2 | DIASTOLIC BLOOD PRESSURE: 70 MMHG | WEIGHT: 160.94 LBS | RESPIRATION RATE: 18 BRPM | HEIGHT: 64 IN | SYSTOLIC BLOOD PRESSURE: 116 MMHG | HEART RATE: 73 BPM

## 2023-03-22 DIAGNOSIS — Z00.00 ANNUAL PHYSICAL EXAM: Primary | ICD-10-CM

## 2023-03-22 DIAGNOSIS — R68.2 DRY MOUTH: ICD-10-CM

## 2023-03-22 DIAGNOSIS — E78.5 HYPERLIPIDEMIA, UNSPECIFIED HYPERLIPIDEMIA TYPE: ICD-10-CM

## 2023-03-22 DIAGNOSIS — K13.70 UNSPECIFIED LESIONS OF ORAL MUCOSA: ICD-10-CM

## 2023-03-22 DIAGNOSIS — H43.393 VITREOUS FLOATERS OF BOTH EYES: ICD-10-CM

## 2023-03-22 DIAGNOSIS — K12.0 CANKER SORES ORAL: ICD-10-CM

## 2023-03-22 DIAGNOSIS — H04.123 DRY EYES: ICD-10-CM

## 2023-03-22 DIAGNOSIS — G95.0 SYRINGOMYELIA: ICD-10-CM

## 2023-03-22 DIAGNOSIS — I20.89 OTHER FORMS OF ANGINA PECTORIS: ICD-10-CM

## 2023-03-22 DIAGNOSIS — R06.83 SNORING: ICD-10-CM

## 2023-03-22 PROCEDURE — 99999 PR PBB SHADOW E&M-EST. PATIENT-LVL V: ICD-10-PCS | Mod: PBBFAC,,, | Performed by: FAMILY MEDICINE

## 2023-03-22 PROCEDURE — 99397 PER PM REEVAL EST PAT 65+ YR: CPT | Mod: GZ,S$PBB,, | Performed by: FAMILY MEDICINE

## 2023-03-22 PROCEDURE — 99999 PR PBB SHADOW E&M-EST. PATIENT-LVL V: CPT | Mod: PBBFAC,,, | Performed by: FAMILY MEDICINE

## 2023-03-22 PROCEDURE — 99397 PR PREVENTIVE VISIT,EST,65 & OVER: ICD-10-PCS | Mod: GZ,S$PBB,, | Performed by: FAMILY MEDICINE

## 2023-03-22 PROCEDURE — 99214 OFFICE O/P EST MOD 30 MIN: CPT | Mod: S$PBB,,, | Performed by: NURSE PRACTITIONER

## 2023-03-22 PROCEDURE — 99214 PR OFFICE/OUTPT VISIT, EST, LEVL IV, 30-39 MIN: ICD-10-PCS | Mod: S$PBB,,, | Performed by: NURSE PRACTITIONER

## 2023-03-22 PROCEDURE — 99213 OFFICE O/P EST LOW 20 MIN: CPT | Mod: PBBFAC,27 | Performed by: NURSE PRACTITIONER

## 2023-03-22 PROCEDURE — 99215 OFFICE O/P EST HI 40 MIN: CPT | Mod: PBBFAC,PO | Performed by: FAMILY MEDICINE

## 2023-03-22 PROCEDURE — 99999 PR PBB SHADOW E&M-EST. PATIENT-LVL III: ICD-10-PCS | Mod: PBBFAC,,, | Performed by: NURSE PRACTITIONER

## 2023-03-22 PROCEDURE — 99999 PR PBB SHADOW E&M-EST. PATIENT-LVL III: CPT | Mod: PBBFAC,,, | Performed by: NURSE PRACTITIONER

## 2023-03-22 RX ORDER — TRIAMCINOLONE ACETONIDE 1 MG/G
PASTE DENTAL 2 TIMES DAILY
Qty: 5 G | Refills: 1 | Status: SHIPPED | OUTPATIENT
Start: 2023-03-22 | End: 2023-04-21

## 2023-03-22 NOTE — PROGRESS NOTES
Subjective:       Patient ID: Leticia Brown is a 67 y.o. female.    Chief Complaint: Follow-up    67 years old female came to the clinic for her physical examination.  Last cholesterol was normal.  No chest pain, palpitation, orthopnea or PND.  Patient reports episodic snoring requesting evaluation for sleep apnea.  Patient also with floaters associated with dry eyes and mouth.Canker sores present.    Follow-up  Pertinent negatives include no chest pain.   Review of Systems   Constitutional: Negative.    HENT: Negative.     Eyes: Negative.    Respiratory: Negative.     Cardiovascular:  Negative for chest pain, palpitations, leg swelling and claudication.   Gastrointestinal: Negative.    Genitourinary: Negative.    Musculoskeletal: Negative.    Neurological: Negative.    Psychiatric/Behavioral: Negative.         Objective:      Physical Exam  Constitutional:       General: She is not in acute distress.     Appearance: She is well-developed. She is not diaphoretic.   HENT:      Head: Normocephalic and atraumatic.      Right Ear: External ear normal.      Left Ear: External ear normal.      Nose: Nose normal.      Mouth/Throat:      Pharynx: No oropharyngeal exudate.   Eyes:      General: No scleral icterus.        Right eye: No discharge.         Left eye: No discharge.      Conjunctiva/sclera: Conjunctivae normal.      Pupils: Pupils are equal, round, and reactive to light.   Neck:      Thyroid: No thyromegaly.      Vascular: No JVD.      Trachea: No tracheal deviation.   Cardiovascular:      Rate and Rhythm: Normal rate and regular rhythm.      Heart sounds: Normal heart sounds. No murmur heard.    No friction rub. No gallop.   Pulmonary:      Effort: Pulmonary effort is normal. No respiratory distress.      Breath sounds: Normal breath sounds. No stridor. No wheezing or rales.   Chest:      Chest wall: No tenderness.   Abdominal:      General: Bowel sounds are normal. There is no distension.      Palpations:  Abdomen is soft. There is no mass.      Tenderness: There is no abdominal tenderness. There is no guarding or rebound.   Musculoskeletal:         General: No tenderness. Normal range of motion.      Cervical back: Normal range of motion and neck supple.   Lymphadenopathy:      Cervical: No cervical adenopathy.   Skin:     General: Skin is warm and dry.      Coloration: Skin is not pale.      Findings: No erythema or rash.   Neurological:      Mental Status: She is alert and oriented to person, place, and time.      Cranial Nerves: No cranial nerve deficit.      Motor: No abnormal muscle tone.      Coordination: Coordination normal.      Deep Tendon Reflexes: Reflexes are normal and symmetric.   Psychiatric:         Behavior: Behavior normal.         Thought Content: Thought content normal.         Judgment: Judgment normal.       Assessment:       Problem List Items Addressed This Visit       Syringomyelia    Other forms of angina pectoris     Other Visit Diagnoses       Annual physical exam    -  Primary    Relevant Orders    CBC Auto Differential    Comprehensive Metabolic Panel    Lipid Panel    TSH    Urinalysis    Hyperlipidemia, unspecified hyperlipidemia type        Relevant Orders    CBC Auto Differential    Comprehensive Metabolic Panel    Lipid Panel    TSH    Urinalysis    Dry eyes        Relevant Orders    Ambulatory referral/consult to Optometry    Snoring        Relevant Orders    Ambulatory referral/consult to Sleep Disorders    Vitreous floaters of both eyes        Relevant Orders    Ambulatory referral/consult to Optometry              Plan:              Leticia was seen today for follow-up.    Diagnoses and all orders for this visit:    Annual physical exam  -     CBC Auto Differential; Future  -     Comprehensive Metabolic Panel; Future  -     Lipid Panel; Future  -     TSH; Future  -     Urinalysis; Future    Other forms of angina pectoris    Syringomyelia    Hyperlipidemia, unspecified  hyperlipidemia type  -     CBC Auto Differential; Future  -     Comprehensive Metabolic Panel; Future  -     Lipid Panel; Future  -     TSH; Future  -     Urinalysis; Future    Dry eyes  -     Ambulatory referral/consult to Optometry; Future  -     Ambulatory referral/consult to Rheumatology; Future    Snoring  -     Ambulatory referral/consult to Sleep Disorders; Future    Vitreous floaters of both eyes  -     Ambulatory referral/consult to Optometry; Future    Dry mouth  -     Ambulatory referral/consult to Rheumatology; Future    Canker sores oral  -     triamcinolone acetonide 0.1% (KENALOG) 0.1 % paste; Place onto teeth 2 (two) times daily.

## 2023-03-22 NOTE — ASSESSMENT & PLAN NOTE
67 year old female with dry mouth and benign appearing oral lesions. Discussed managing dry mouth with OTC products. She will also follow up with rheum as planned. RTC in 3 weeks for repeat exam. Consider biopsy if lesions are still presents. Questions answered.

## 2023-03-22 NOTE — PROGRESS NOTES
Subjective:       Patient ID: Leticia Brown is a 67 y.o. female.    Chief Complaint: mouth sores    HPI    Leticia Brown is a 67 year old female who was referred to the head and neck clinic for mouth sores and dryness. 3 weeks ago, she first noticed that her mouth was extremely dry in the morning. She developed canker sores at the same time. The sores are scattered throughout and come and go. They are not painful but are uncomfortable. She does not recall biting her tongue or inside of her mouth. She does report snoring and possible sleep apnea. She is scheduled for a sleep study soon.    She is seeing rheumatology next week for sicca symptoms.     Past Medical History:   Diagnosis Date    Anxiety disorder, unspecified     BCC (basal cell carcinoma)     Cancer     Hyperlipemia     Sleep apnea        Past Surgical History:   Procedure Laterality Date    ADENOIDECTOMY      back sx      BUNIONECTOMY      COLONOSCOPY  2005    diverticulosis    COLONOSCOPY N/A 03/25/2022    Procedure: COLONOSCOPY  Covid Test Rapid;  Surgeon: Marshall King MD;  Location: [facility];  Service: Endoscopy;  Laterality: N/A;    CREATION OF URETHRAL SLING USING POLYPROPYLENE TAPE BY RETROPUBIC APPROACH N/A 12/20/2022    Procedure: SLING, RETROPUBIC WITH SPARC;  Surgeon: Marcie Rea MD;  Location: [address];  Service: Urology;  Laterality: N/A;    CYSTOSCOPY N/A 12/20/2022    Procedure: CYSTOSCOPY;  Surgeon: Marcie Rea MD;  Location: [facility] OR [address] [place] [address];  Service: Urology;  Laterality: N/A;    NASAL SEPTOPLASTY N/A 11/05/2021    Procedure: SEPTOPLASTY, NOSE;  Surgeon: Irving Singh MD;  Location: [facility] OR [address] [place] [address];  Service: ENT;  Laterality: N/A;    NASAL TURBINATE REDUCTION Bilateral 11/05/2021    Procedure: REDUCTION, NASAL TURBINATE;  Surgeon: Irving Singh MD;  Location: [facility] OR [address] [place] [address];  Service: ENT;  Laterality: Bilateral;    NECK SURGERY      TONSILLECTOMY      TYMPANOPLASTY           Current Outpatient  Medications:     acetaZOLAMIDE (DIAMOX) 125 MG Tab, Take 125 mg by mouth. PRN for high altitude, Disp: , Rfl:     atorvastatin (LIPITOR) 10 MG tablet, TAKE 1 TABLET(10 MG) BY MOUTH EVERY EVENING, Disp: 90 tablet, Rfl: 2    estradioL (ESTRACE) 1 MG tablet, TAKE 1 TABLET(1 MG) BY MOUTH EVERY DAY, Disp: 90 tablet, Rfl: 3    venlafaxine (EFFEXOR-XR) 150 MG Cp24, Take 1 capsule (150 mg total) by mouth once daily., Disp: 90 capsule, Rfl: 3    acyclovir (ZOVIRAX) 200 MG capsule, Take 1 capsule (200 mg total) by mouth 3 (three) times daily. for 5 days, Disp: 15 capsule, Rfl: 3    soft lens rinse,store solution (SALINE SOLUTION) Soln, 1 application by Misc.(Non-Drug; Combo Route) route 2 (two) times a day., Disp: 710 mL, Rfl: 3    triamcinolone acetonide 0.1% (KENALOG) 0.1 % paste, Place onto teeth 2 (two) times daily., Disp: 5 g, Rfl: 1    Review of patient's allergies indicates:   Allergen Reactions    Sulfa (sulfonamide antibiotics) Hives       Social History     Socioeconomic History    Marital status: Single   Tobacco Use    Smoking status: Never    Smokeless tobacco: Never   Substance and Sexual Activity    Alcohol use: Yes     Comment: once a month    Drug use: No     Social Determinants of Health     Financial Resource Strain: Low Risk     Difficulty of Paying Living Expenses: Not hard at all   Food Insecurity: No Food Insecurity    Worried About Running Out of Food in the Last Year: Never true    Ran Out of Food in the Last Year: Never true   Transportation Needs: No Transportation Needs    Lack of Transportation (Medical): No    Lack of Transportation (Non-Medical): No   Physical Activity: Insufficiently Active    Days of Exercise per Week: 1 day    Minutes of Exercise per Session: 10 min   Stress: No Stress Concern Present    Feeling of Stress : Only a little   Social Connections: Unknown    Frequency of Communication with Friends and Family: More than three times a week    Frequency of Social Gatherings with  Friends and Family: More than three times a week    Active Member of Clubs or Organizations: No    Attends Club or Organization Meetings: Never    Marital Status: Living with partner   Housing Stability: Low Risk     Unable to Pay for Housing in the Last Year: No    Number of Places Lived in the Last Year: 1    Unstable Housing in the Last Year: No       Family History   Problem Relation Age of Onset    Stroke Mother     Breast cancer Maternal Cousin      Review of Systems   Constitutional:  Negative for appetite change, chills, diaphoresis, fatigue, fever and unexpected weight change.   HENT:  Positive for mouth dryness and mouth sores. Negative for nasal congestion, dental problem, drooling, ear discharge, ear pain, facial swelling, hearing loss, nosebleeds, postnasal drip, rhinorrhea, sinus pressure/congestion, sneezing, sore throat, tinnitus, trouble swallowing and voice change.    Eyes:  Positive for eye dryness. Negative for pain, discharge, redness and itching.   Respiratory:  Negative for cough and shortness of breath.    Cardiovascular:  Negative for chest pain.   Gastrointestinal:  Negative for abdominal distention, abdominal pain, diarrhea, nausea and vomiting.   Endocrine: Negative for cold intolerance and heat intolerance.   Genitourinary:  Negative for difficulty urinating.   Musculoskeletal:  Negative for neck pain and neck stiffness.   Integumentary:  Negative for rash.        Dry skin    Neurological:  Negative for dizziness, weakness and headaches.   Hematological:  Negative for adenopathy.       Objective:      Physical Exam  Vitals reviewed.   Constitutional:       General: She is not in acute distress.     Appearance: She is well-developed. She is not ill-appearing or diaphoretic.   HENT:      Head: Normocephalic and atraumatic.      Jaw: No trismus.      Right Ear: Hearing and external ear normal.      Left Ear: Hearing and external ear normal.      Nose: Nose normal. No nasal deformity,  mucosal edema or rhinorrhea.      Right Sinus: No maxillary sinus tenderness or frontal sinus tenderness.      Left Sinus: No maxillary sinus tenderness or frontal sinus tenderness.      Mouth/Throat:      Lips: Pink. No lesions.      Mouth: Mucous membranes are not pale, dry and not cyanotic. Oral lesions present.      Dentition: Normal dentition. Does not have dentures. No dental caries.      Tongue: No lesions. Tongue does not deviate from midline.      Palate: No mass and lesions.      Pharynx: Uvula midline. No pharyngeal swelling, oropharyngeal exudate, posterior oropharyngeal erythema or uvula swelling.      Tonsils: No tonsillar abscesses.      Comments: Multiple raised, soft lesions covered in normal mucosa  Eyes:      General: No scleral icterus.        Right eye: No discharge.         Left eye: No discharge.      Conjunctiva/sclera: Conjunctivae normal.   Neck:      Thyroid: No thyroid mass or thyromegaly.      Vascular: No JVD.      Trachea: Trachea and phonation normal. No tracheal tenderness or tracheal deviation.      Comments: Salivary glands - there are no lesions or asymmetric findings in the submandibular or parotid glands    Cardiovascular:      Rate and Rhythm: Normal rate.   Pulmonary:      Effort: Pulmonary effort is normal. No respiratory distress.      Breath sounds: No stridor.   Musculoskeletal:      Cervical back: Normal range of motion and neck supple.   Lymphadenopathy:      Head:      Right side of head: No submental, submandibular, tonsillar or preauricular adenopathy.      Left side of head: No submental, submandibular, tonsillar or preauricular adenopathy.      Cervical: No cervical adenopathy.   Skin:     General: Skin is warm and dry.      Coloration: Skin is not pale.      Findings: No erythema or rash.   Neurological:      Mental Status: She is alert and oriented to person, place, and time.      Cranial Nerves: No cranial nerve deficit.   Psychiatric:         Behavior: Behavior  normal. Behavior is cooperative.         Thought Content: Thought content normal.       Assessment:       Problem List Items Addressed This Visit          ENT    Unspecified lesions of oral mucosa     67 year old female with dry mouth and benign appearing oral lesions. Discussed managing dry mouth with OTC products. She will also follow up with rheum as planned. RTC in 3 weeks for repeat exam. Consider biopsy if lesions are still presents. Questions answered.            Other    Dry mouth

## 2023-03-22 NOTE — ADDENDUM NOTE
Addended by: BRAD CURTIS on: 3/22/2023 10:10 AM     Modules accepted: Orders     good, to achieve stated therapy goals

## 2023-03-27 ENCOUNTER — LAB VISIT (OUTPATIENT)
Dept: LAB | Facility: HOSPITAL | Age: 68
End: 2023-03-27
Attending: INTERNAL MEDICINE
Payer: MEDICARE

## 2023-03-27 ENCOUNTER — OFFICE VISIT (OUTPATIENT)
Dept: RHEUMATOLOGY | Facility: CLINIC | Age: 68
End: 2023-03-27
Payer: MEDICARE

## 2023-03-27 VITALS
HEIGHT: 64 IN | DIASTOLIC BLOOD PRESSURE: 83 MMHG | BODY MASS INDEX: 29.77 KG/M2 | WEIGHT: 174.38 LBS | HEART RATE: 73 BPM | SYSTOLIC BLOOD PRESSURE: 132 MMHG

## 2023-03-27 DIAGNOSIS — R68.2 DRY MOUTH: ICD-10-CM

## 2023-03-27 DIAGNOSIS — H04.123 DRY EYES: ICD-10-CM

## 2023-03-27 LAB
CCP AB SER IA-ACNC: <0.5 U/ML
CRP SERPL-MCNC: 1.9 MG/L (ref 0–8.2)
ERYTHROCYTE [SEDIMENTATION RATE] IN BLOOD BY WESTERGREN METHOD: 8 MM/HR (ref 0–20)
RHEUMATOID FACT SERPL-ACNC: <13 IU/ML (ref 0–15)

## 2023-03-27 PROCEDURE — 99999 PR PBB SHADOW E&M-EST. PATIENT-LVL III: CPT | Mod: PBBFAC,,, | Performed by: INTERNAL MEDICINE

## 2023-03-27 PROCEDURE — 99204 OFFICE O/P NEW MOD 45 MIN: CPT | Mod: S$PBB,,, | Performed by: INTERNAL MEDICINE

## 2023-03-27 PROCEDURE — 86038 ANTINUCLEAR ANTIBODIES: CPT | Performed by: INTERNAL MEDICINE

## 2023-03-27 PROCEDURE — 86235 NUCLEAR ANTIGEN ANTIBODY: CPT | Performed by: INTERNAL MEDICINE

## 2023-03-27 PROCEDURE — 36415 COLL VENOUS BLD VENIPUNCTURE: CPT | Mod: PO | Performed by: INTERNAL MEDICINE

## 2023-03-27 PROCEDURE — 86200 CCP ANTIBODY: CPT | Performed by: INTERNAL MEDICINE

## 2023-03-27 PROCEDURE — 99999 PR PBB SHADOW E&M-EST. PATIENT-LVL III: ICD-10-PCS | Mod: PBBFAC,,, | Performed by: INTERNAL MEDICINE

## 2023-03-27 PROCEDURE — 85651 RBC SED RATE NONAUTOMATED: CPT | Performed by: INTERNAL MEDICINE

## 2023-03-27 PROCEDURE — 99204 PR OFFICE/OUTPT VISIT, NEW, LEVL IV, 45-59 MIN: ICD-10-PCS | Mod: S$PBB,,, | Performed by: INTERNAL MEDICINE

## 2023-03-27 PROCEDURE — 86140 C-REACTIVE PROTEIN: CPT | Performed by: INTERNAL MEDICINE

## 2023-03-27 PROCEDURE — 86431 RHEUMATOID FACTOR QUANT: CPT | Performed by: INTERNAL MEDICINE

## 2023-03-27 PROCEDURE — 86235 NUCLEAR ANTIGEN ANTIBODY: CPT | Mod: 59 | Performed by: INTERNAL MEDICINE

## 2023-03-27 PROCEDURE — 99213 OFFICE O/P EST LOW 20 MIN: CPT | Mod: PBBFAC,PO | Performed by: INTERNAL MEDICINE

## 2023-03-27 NOTE — PROGRESS NOTES
RHEUMATOLOGY CLINIC INITIAL VISIT    Reason for consult:-  Sjogren syndrome  Chief complaints, HPI, ROS, EXAM, Assessment & Plans:-  Leticia Smith a 67 y.o. pleasant female comes in with worsening sicca syndrome.  Acute onset worsening sicca syndrome especially dry mouth associated with oral ulcer for the past 3 weeks.  No new prescription medications.  Started using toothpaste with 5% hydrogen peroxide recently.  No gumline infections.  No recurrent dental caries.  Longstanding history of memory fog issues.  No photosensitive malar rash.  Had dry skin around her upper eyelids around the eyebrows recently.  In the past she had a similar spot superior to the eyebrow.  She never had issues of dry skin.  She usually has more of an early skin.  Takes estrogen supplements for long-term hormone imbalance issues.  No change in her doses of the hormone supplements.  No Raynaud's phenomenon.  Rheumatological review of system negative otherwise.  Physical examination shows mild Heberden's nodule bilateral D IP and PIP joints.  Nail fold capillaries normal.  No capillary drop outs.  Normal MCP joints.  Normal wrist joints.  Nontender elbows.  Nontender knees.  Nontender ankles.  Negative MTP squeeze test.  No enthesitis.  No dactylitis.  No small to intermediate joint effusion.  Good oral pooling of saliva.  One oral ulcer right lower lip.  No oral candidiasis.    1. Dry eyes    2. Dry mouth      Problem List Items Addressed This Visit       Dry mouth    Relevant Orders    MIKAEL Screen w/Reflex    Rheumatoid Factor    Cyclic Citrullinated Peptide Antibody, IgG    Sedimentation rate    C-Reactive Protein    Sjogrens syndrome-A extractable nuclear antibody    Sjogrens syndrome-B extractable nuclear antibody    Dry eyes    Relevant Orders    MIKAEL Screen w/Reflex    Rheumatoid Factor    Cyclic Citrullinated Peptide Antibody, IgG    Sedimentation rate    C-Reactive Protein    Sjogrens syndrome-A extractable nuclear antibody     Sjogrens syndrome-B extractable nuclear antibody       Acute onset worsening dry mouth associated with oral ulcer without any other manifestations to suspect Sjogren's syndrome.  Maybe related to the recent introduction of 5% hydrogen peroxide to paste ordering the oral microbial nemo.  Pretest probability for underlying Sjogren's syndrome is low.  Check Sjogren's serologies along with other connective tissue serologies today.  Advised to discontinue the hydrogen peroxide to paste and monitor for any change in oral symptoms in the next few weeks.  If no significant improvement or any abnormality found on the serology, we will consult ENT for minor salivary gland biopsy.  I have explained all of the above in detail and the patient understands all of the current recommendation(s). I have answered all questions to the best of my ability and to their complete satisfaction.     # Follow up if symptoms worsen or fail to improve.      Past Medical History:   Diagnosis Date    Anxiety disorder, unspecified     BCC (basal cell carcinoma)     Cancer     Hyperlipemia     Sleep apnea        Past Surgical History:   Procedure Laterality Date    ADENOIDECTOMY      back sx      BUNIONECTOMY      COLONOSCOPY  2005    diverticulosis    COLONOSCOPY N/A 03/25/2022    Procedure: COLONOSCOPY  Covid Test Rapid;  Surgeon: Marshall King MD;  Location: [facility];  Service: Endoscopy;  Laterality: N/A;    CREATION OF URETHRAL SLING USING POLYPROPYLENE TAPE BY RETROPUBIC APPROACH N/A 12/20/2022    Procedure: SLING, RETROPUBIC WITH SPARC;  Surgeon: Marcie Rea MD;  Location: [address];  Service: Urology;  Laterality: N/A;    CYSTOSCOPY N/A 12/20/2022    Procedure: CYSTOSCOPY;  Surgeon: Marcie Rea MD;  Location: [address];  Service: Urology;  Laterality: N/A;    NASAL SEPTOPLASTY N/A 11/05/2021    Procedure: SEPTOPLASTY, NOSE;  Surgeon: Irving Singh MD;  Location: [address];  Service: ENT;   Laterality: N/A;    NASAL TURBINATE REDUCTION Bilateral 11/05/2021    Procedure: REDUCTION, NASAL TURBINATE;  Surgeon: Irving Singh MD;  Location: [facility] OR [address] [place] [address];  Service: ENT;  Laterality: Bilateral;    NECK SURGERY      TONSILLECTOMY      TYMPANOPLASTY          Social History     Tobacco Use    Smoking status: Never    Smokeless tobacco: Never   Substance Use Topics    Alcohol use: Yes     Comment: once a month    Drug use: No       Family History   Problem Relation Age of Onset    Stroke Mother     Breast cancer Maternal Cousin        Review of patient's allergies indicates:   Allergen Reactions    Sulfa (sulfonamide antibiotics) Hives       Medication List with Changes/Refills   Current Medications    ACETAZOLAMIDE (DIAMOX) 125 MG TAB    Take 125 mg by mouth. PRN for high altitude    ACYCLOVIR (ZOVIRAX) 200 MG CAPSULE    Take 1 capsule (200 mg total) by mouth 3 (three) times daily. for 5 days    ATORVASTATIN (LIPITOR) 10 MG TABLET    TAKE 1 TABLET(10 MG) BY MOUTH EVERY EVENING    ESTRADIOL (ESTRACE) 1 MG TABLET    TAKE 1 TABLET(1 MG) BY MOUTH EVERY DAY    SOFT LENS RINSE,STORE SOLUTION (SALINE SOLUTION) SOLN    1 application by Misc.(Non-Drug; Combo Route) route 2 (two) times a day.    TRIAMCINOLONE ACETONIDE 0.1% (KENALOG) 0.1 % PASTE    Place onto teeth 2 (two) times daily.    VENLAFAXINE (EFFEXOR-XR) 150 MG CP24    Take 1 capsule (150 mg total) by mouth once daily.         Thank you for allowing me to participate in the care Sariah Brown.    Disclaimer: This note was prepared using voice recognition system and is likely to have sound alike errors and is not proof read.  Please call me with any questions.      Answers submitted by the patient for this visit:  Rheumatology Questionnaire (Submitted on 3/22/2023)  fever: No  eye redness: No  mouth sores: Yes  headaches: Yes  shortness of breath: No  chest pain: No  trouble swallowing: Yes  diarrhea: No  constipation: No  unexpected weight change:  No  genital sore: No  dysuria: No  During the last 3 days, have you had a skin rash?: Yes  Bruises or bleeds easily: No  cough: No

## 2023-03-28 LAB
ANA SER QL IF: NORMAL
ANTI-SSA ANTIBODY: 0.07 RATIO (ref 0–0.99)
ANTI-SSA INTERPRETATION: NEGATIVE
ANTI-SSB ANTIBODY: 0.06 RATIO (ref 0–0.99)
ANTI-SSB INTERPRETATION: NEGATIVE

## 2023-04-13 ENCOUNTER — OFFICE VISIT (OUTPATIENT)
Dept: OTOLARYNGOLOGY | Facility: CLINIC | Age: 68
End: 2023-04-13
Payer: MEDICARE

## 2023-04-13 VITALS
BODY MASS INDEX: 29.62 KG/M2 | DIASTOLIC BLOOD PRESSURE: 74 MMHG | WEIGHT: 173.5 LBS | HEART RATE: 76 BPM | HEIGHT: 64 IN | SYSTOLIC BLOOD PRESSURE: 113 MMHG

## 2023-04-13 DIAGNOSIS — R68.2 DRY MOUTH: ICD-10-CM

## 2023-04-13 DIAGNOSIS — K11.7 XEROSTOMIA: Primary | ICD-10-CM

## 2023-04-13 PROCEDURE — 99213 PR OFFICE/OUTPT VISIT, EST, LEVL III, 20-29 MIN: ICD-10-PCS | Mod: S$PBB,,, | Performed by: OTOLARYNGOLOGY

## 2023-04-13 PROCEDURE — 99999 PR PBB SHADOW E&M-EST. PATIENT-LVL III: ICD-10-PCS | Mod: PBBFAC,,, | Performed by: OTOLARYNGOLOGY

## 2023-04-13 PROCEDURE — 99213 OFFICE O/P EST LOW 20 MIN: CPT | Mod: S$PBB,,, | Performed by: OTOLARYNGOLOGY

## 2023-04-13 PROCEDURE — 99999 PR PBB SHADOW E&M-EST. PATIENT-LVL III: CPT | Mod: PBBFAC,,, | Performed by: OTOLARYNGOLOGY

## 2023-04-13 PROCEDURE — 99213 OFFICE O/P EST LOW 20 MIN: CPT | Mod: PBBFAC | Performed by: OTOLARYNGOLOGY

## 2023-04-13 RX ORDER — PILOCARPINE HYDROCHLORIDE 5 MG/1
5 TABLET, FILM COATED ORAL 2 TIMES DAILY
Qty: 60 TABLET | Refills: 11 | Status: SHIPPED | OUTPATIENT
Start: 2023-04-13 | End: 2023-05-03

## 2023-04-18 NOTE — ASSESSMENT & PLAN NOTE
Benign-appearing mucosal lesions with associated dry mouth.  Rheum labs WNL.  Will hold off on minor salivary gland biopsy or biopsy of the lesions in question.  Trial of pilocarpine.  Return in 3 weeks.

## 2023-04-18 NOTE — PROGRESS NOTES
CC: Oral lesions, dry mouth      HPI   67 y.o. female presents for evaluation of several lesions of the oral mucosa dry mouth.  She reports these have been present for several weeks.  They are not particularly painful.  She has seen Rheumatology is undergoing laboratory workup for Sjogren's disease.  Recent Sjogren's labs were unremarkable.    Review of Systems   Constitutional: Negative for fatigue and unexpected weight change.   HENT: Per HPI.  Eyes: Negative for visual disturbance.   Respiratory: Negative for shortness of breath, hemoptysis   Cardiovascular: Negative for chest pain and palpitations.   Musculoskeletal: Negative for decreased ROM, back pain.   Skin: Negative for rash, sunburn, itching.   Neurological: Negative for dizziness and seizures.   Hematological: Negative for adenopathy. Does not bruise/bleed easily.   Endocrine: Negative for rapid weight loss/weight gain, heat/cold intolerance.     Past Medical History   Patient Active Problem List   Diagnosis    Stress incontinence    DJD (degenerative joint disease), cervical    Ataxic gait    Syringomyelia    Annular tear of lumbar disc    Foraminal stenosis of lumbosacral region    Lumbar radiculopathy    Deviated nasal septum    Other forms of angina pectoris    Unspecified lesions of oral mucosa    Dry mouth    Dry eyes           Past Surgical History   Past Surgical History:   Procedure Laterality Date    ADENOIDECTOMY      back sx      BUNIONECTOMY      COLONOSCOPY  2005    diverticulosis    COLONOSCOPY N/A 03/25/2022    Procedure: COLONOSCOPY  Covid Test Rapid;  Surgeon: Marshall King MD;  Location: [facility];  Service: Endoscopy;  Laterality: N/A;    CREATION OF URETHRAL SLING USING POLYPROPYLENE TAPE BY RETROPUBIC APPROACH N/A 12/20/2022    Procedure: SLING, RETROPUBIC WITH SPARC;  Surgeon: Marcie Rea MD;  Location: [address];  Service: Urology;  Laterality: N/A;    CYSTOSCOPY N/A 12/20/2022    Procedure: CYSTOSCOPY;   Surgeon: Marcie Rea MD;  Location: [address];  Service: Urology;  Laterality: N/A;    NASAL SEPTOPLASTY N/A 11/05/2021    Procedure: SEPTOPLASTY, NOSE;  Surgeon: Irving Singh MD;  Location: [address];  Service: ENT;  Laterality: N/A;    NASAL TURBINATE REDUCTION Bilateral 11/05/2021    Procedure: REDUCTION, NASAL TURBINATE;  Surgeon: Irving Singh MD;  Location: [address];  Service: ENT;  Laterality: Bilateral;    NECK SURGERY      TONSILLECTOMY      TYMPANOPLASTY           Family History   Family History   Problem Relation Age of Onset    Stroke Mother     Breast cancer Maternal Cousin            Social History   .  Social History     Socioeconomic History    Marital status: Single   Tobacco Use    Smoking status: Never    Smokeless tobacco: Never   Substance and Sexual Activity    Alcohol use: Yes     Comment: once a month    Drug use: No     Social Determinants of Health     Financial Resource Strain: Low Risk     Difficulty of Paying Living Expenses: Not hard at all   Food Insecurity: No Food Insecurity    Worried About Running Out of Food in the Last Year: Never true    Ran Out of Food in the Last Year: Never true   Transportation Needs: No Transportation Needs    Lack of Transportation (Medical): No    Lack of Transportation (Non-Medical): No   Physical Activity: Insufficiently Active    Days of Exercise per Week: 1 day    Minutes of Exercise per Session: 10 min   Stress: No Stress Concern Present    Feeling of Stress : Only a little   Social Connections: Unknown    Frequency of Communication with Friends and Family: More than three times a week    Frequency of Social Gatherings with Friends and Family: More than three times a week    Active Member of Clubs or Organizations: No    Attends Club or Organization Meetings: Never    Marital Status: Living with partner   Housing Stability: Low Risk     Unable to Pay for Housing in the Last Year: No    Number of Places Lived in the  Last Year: 1    Unstable Housing in the Last Year: No         Allergies   Review of patient's allergies indicates:   Allergen Reactions    Sulfa (sulfonamide antibiotics) Hives           Physical Exam     Vitals:    04/13/23 1138   BP: 113/74   Pulse: 76         Body mass index is 29.78 kg/m².      General: AOx3, NAD   Respiratory:  Symmetric chest rise, normal effort  Oral Cavity:  Oral Tongue mobile, no lesions noted. Hard Palate WNL. No buccal or FOM lesions.  Several benign-appearing, sessile lesions of the oral mucosa most notable on the right lower lip.  No ulcers or other worrisome lesions.  Oropharynx:  No masses/lesions of the posterior pharyngeal wall. Tonsillar fossa without lesions. Soft palate without masses. Midline uvula.   Neck: No scars.  No cervical lymphadenopathy, thyromegaly or thyroid nodules.  Normal range of motion.    Face: House Brackmann I bilaterally.     Assessment/Plan  Problem List Items Addressed This Visit          Other    Dry mouth     Benign-appearing mucosal lesions with associated dry mouth.  Rheum labs WNL.  Will hold off on minor salivary gland biopsy or biopsy of the lesions in question.  Trial of pilocarpine.  Return in 3 weeks.            Other Visit Diagnoses       Xerostomia    -  Primary    Relevant Medications    pilocarpine (SALAGEN, PILOCARPINE,) 5 MG Tab

## 2023-05-01 ENCOUNTER — TELEPHONE (OUTPATIENT)
Dept: ADMINISTRATIVE | Facility: CLINIC | Age: 68
End: 2023-05-01
Payer: MEDICARE

## 2023-05-03 ENCOUNTER — OFFICE VISIT (OUTPATIENT)
Dept: FAMILY MEDICINE | Facility: CLINIC | Age: 68
End: 2023-05-03
Payer: MEDICARE

## 2023-05-03 VITALS
WEIGHT: 176.13 LBS | BODY MASS INDEX: 30.07 KG/M2 | OXYGEN SATURATION: 97 % | DIASTOLIC BLOOD PRESSURE: 68 MMHG | SYSTOLIC BLOOD PRESSURE: 112 MMHG | HEIGHT: 64 IN | HEART RATE: 97 BPM

## 2023-05-03 DIAGNOSIS — F33.9 RECURRENT MAJOR DEPRESSIVE DISORDER, REMISSION STATUS UNSPECIFIED: ICD-10-CM

## 2023-05-03 DIAGNOSIS — G95.0 SYRINGOMYELIA: ICD-10-CM

## 2023-05-03 DIAGNOSIS — E66.9 OBESITY (BMI 30.0-34.9): ICD-10-CM

## 2023-05-03 DIAGNOSIS — J84.10 CALCIFIED GRANULOMA OF LUNG: ICD-10-CM

## 2023-05-03 DIAGNOSIS — Z00.00 ENCOUNTER FOR PREVENTIVE HEALTH EXAMINATION: Primary | ICD-10-CM

## 2023-05-03 DIAGNOSIS — I77.9 BILATERAL CAROTID ARTERY DISEASE, UNSPECIFIED TYPE: ICD-10-CM

## 2023-05-03 DIAGNOSIS — E78.5 DYSLIPIDEMIA: ICD-10-CM

## 2023-05-03 PROCEDURE — 99999 PR PBB SHADOW E&M-EST. PATIENT-LVL III: ICD-10-PCS | Mod: PBBFAC,,, | Performed by: NURSE PRACTITIONER

## 2023-05-03 PROCEDURE — 99999 PR PBB SHADOW E&M-EST. PATIENT-LVL III: CPT | Mod: PBBFAC,,, | Performed by: NURSE PRACTITIONER

## 2023-05-03 PROCEDURE — 99213 OFFICE O/P EST LOW 20 MIN: CPT | Mod: PBBFAC,PO | Performed by: NURSE PRACTITIONER

## 2023-05-03 PROCEDURE — G0439 PR MEDICARE ANNUAL WELLNESS SUBSEQUENT VISIT: ICD-10-PCS | Mod: ,,, | Performed by: NURSE PRACTITIONER

## 2023-05-03 PROCEDURE — G0439 PPPS, SUBSEQ VISIT: HCPCS | Mod: ,,, | Performed by: NURSE PRACTITIONER

## 2023-05-03 RX ORDER — MULTIVITAMIN
1 TABLET ORAL DAILY
COMMUNITY

## 2023-05-03 RX ORDER — LANOLIN ALCOHOL/MO/W.PET/CERES
1 CREAM (GRAM) TOPICAL
COMMUNITY
End: 2023-06-29

## 2023-05-03 NOTE — PROGRESS NOTES
"  Leticia Brown presented for a  Medicare AWV and comprehensive Health Risk Assessment today. The following components were reviewed and updated:    Medical history  Family History  Social history  Allergies and Current Medications  Health Risk Assessment  Health Maintenance  Care Team         ** See Completed Assessments for Annual Wellness Visit within the encounter summary.**         The following assessments were completed:  Living Situation  CAGE  Depression Screening  Timed Get Up and Go  Whisper Test  Cognitive Function Screening        Nutrition Screening  ADL Screening  PAQ Screening        Vitals:    05/03/23 1459   BP: 112/68   BP Location: Right arm   Patient Position: Sitting   BP Method: Large (Manual)   Pulse: 97   SpO2: 97%   Weight: 79.9 kg (176 lb 2.4 oz)   Height: 5' 4" (1.626 m)     Body mass index is 30.24 kg/m².    Physical Exam  Vitals reviewed.   Constitutional:       General: She is not in acute distress.     Appearance: Normal appearance. She is well-developed and well-groomed. She is obese.   HENT:      Head: Normocephalic.   Cardiovascular:      Rate and Rhythm: Normal rate.   Pulmonary:      Effort: Pulmonary effort is normal. No respiratory distress.   Skin:     General: Skin is warm and dry.      Coloration: Skin is not pale.   Neurological:      Mental Status: She is alert and oriented to person, place, and time.      Coordination: Coordination normal.      Gait: Gait normal.   Psychiatric:         Attention and Perception: Attention normal.         Mood and Affect: Mood and affect normal.         Speech: Speech normal.         Behavior: Behavior normal. Behavior is cooperative.         Cognition and Memory: Cognition and memory normal.             Diagnoses and health risks identified today and associated recommendations/orders:    1. Encounter for preventive health examination    2. Syringomyelia  Chronic; stable. As seen on previous imaging. Follow up with PCP.    3. Recurrent major " depressive disorder, remission status unspecified  Chronic; stable on medication. Follow up with PCP.    4. Bilateral carotid artery disease, unspecified type  Chronic; stable on medication. As seen on previous imaging. Follow up with PCP.    5. Calcified granuloma of lung  Chronic; stable. As seen on previous imaging. Follow up with PCP.      6. Dyslipidemia  Chronic; stable on medication. Follow up with PCP.    7. Obesity (BMI 30.0-34.9)  Continue to eat a low salt/low fat diet and discussed importance of engaging in physical activity at least 5x/week for a minimum of 30 min/day.      Provided Leticia with a 5-10 year written screening schedule and personal prevention plan. Recommendations were developed using the USPSTF age appropriate recommendations. Education, counseling, and referrals were provided as needed. After Visit Summary given to patient which includes a list of additional screenings/tests needed.    Follow up for your next annual wellness visit.    Arianne Greene NP      Advance Care Planning     I offered to discuss advanced care planning, including how to pick a person who would make decisions for you if you were unable to make them for yourself, called a health care power of , and what kind of decisions you might make such as use of life sustaining treatments such as ventilators and tube feeding when faced with a life limiting illness recorded on a living will that they will need to know. (How you want to be cared for as you near the end of your natural life)     X Patient is interested in learning more about how to make advanced directives. I provided them paperwork and offered to discuss this with them.

## 2023-05-03 NOTE — PATIENT INSTRUCTIONS
Counseling and Referral of Other Preventative  (Italic type indicates deductible and co-insurance are waived)    Patient Name: Leticia Brown  Today's Date: 5/3/2023    Health Maintenance       Date Due Completion Date    Aspirin/Antiplatelet Therapy Never done ---    Hemoglobin A1c (Diabetic Prevention Screening) 08/01/2017 8/1/2014    COVID-19 Vaccine (4 - Booster for Moderna series) 12/22/2021 10/27/2021    Mammogram 08/25/2023 8/25/2022    High Dose Statin 04/13/2024 4/13/2023    DEXA Scan 06/21/2024 6/21/2021    TETANUS VACCINE 01/14/2026 1/14/2016    Lipid Panel 03/22/2028 3/22/2023    Colorectal Cancer Screening 03/25/2032 3/25/2022        No orders of the defined types were placed in this encounter.    The following information is provided to all patients.  This information is to help you find resources for any of the problems found today that may be affecting your health:                Living healthy guide: www.[facility].[place].gov      Understanding Diabetes: www.diabetes.org      Eating healthy: www.cdc.gov/healthyweight      CDC home safety checklist: www.cdc.gov/steadi/patient.html      Agency on Aging: www.goea.[place].gov      Alcoholics anonymous (AA): www.aa.org      Physical Activity: www.luigi.nih.gov/xx7xgni      Tobacco use: www.quitwithusla.org

## 2023-05-04 ENCOUNTER — OFFICE VISIT (OUTPATIENT)
Dept: UROLOGY | Facility: CLINIC | Age: 68
End: 2023-05-04
Payer: MEDICARE

## 2023-05-04 VITALS
WEIGHT: 174.19 LBS | HEIGHT: 64 IN | HEART RATE: 80 BPM | DIASTOLIC BLOOD PRESSURE: 70 MMHG | SYSTOLIC BLOOD PRESSURE: 103 MMHG | BODY MASS INDEX: 29.74 KG/M2

## 2023-05-04 DIAGNOSIS — Z09 FOLLOW-UP EXAMINATION AFTER UROLOGICAL SURGERY: Primary | ICD-10-CM

## 2023-05-04 PROBLEM — G89.29 CHRONIC PAIN OF LEFT ANKLE: Status: ACTIVE | Noted: 2018-11-01

## 2023-05-04 PROBLEM — J84.10 CALCIFIED GRANULOMA OF LUNG: Status: ACTIVE | Noted: 2023-05-04

## 2023-05-04 PROBLEM — E66.9 OBESITY (BMI 30.0-34.9): Status: ACTIVE | Noted: 2023-05-04

## 2023-05-04 PROBLEM — F33.9 RECURRENT MAJOR DEPRESSIVE DISORDER: Status: ACTIVE | Noted: 2023-05-04

## 2023-05-04 PROBLEM — E78.5 DYSLIPIDEMIA: Status: ACTIVE | Noted: 2023-05-04

## 2023-05-04 PROBLEM — E66.811 OBESITY (BMI 30.0-34.9): Status: ACTIVE | Noted: 2023-05-04

## 2023-05-04 PROBLEM — M25.572 CHRONIC PAIN OF LEFT ANKLE: Status: ACTIVE | Noted: 2018-11-01

## 2023-05-04 PROBLEM — I77.9 BILATERAL CAROTID ARTERY DISEASE: Status: ACTIVE | Noted: 2023-05-04

## 2023-05-04 PROBLEM — J98.4 CALCIFIED GRANULOMA OF LUNG: Status: ACTIVE | Noted: 2023-05-04

## 2023-05-04 PROCEDURE — 99999 PR PBB SHADOW E&M-EST. PATIENT-LVL III: ICD-10-PCS | Mod: PBBFAC,,, | Performed by: UROLOGY

## 2023-05-04 PROCEDURE — 99213 OFFICE O/P EST LOW 20 MIN: CPT | Mod: PBBFAC | Performed by: UROLOGY

## 2023-05-04 PROCEDURE — 99214 PR OFFICE/OUTPT VISIT, EST, LEVL IV, 30-39 MIN: ICD-10-PCS | Mod: S$PBB,,, | Performed by: UROLOGY

## 2023-05-04 PROCEDURE — 99214 OFFICE O/P EST MOD 30 MIN: CPT | Mod: S$PBB,,, | Performed by: UROLOGY

## 2023-05-04 PROCEDURE — 99999 PR PBB SHADOW E&M-EST. PATIENT-LVL III: CPT | Mod: PBBFAC,,, | Performed by: UROLOGY

## 2023-05-04 NOTE — PROGRESS NOTES
CHIEF COMPLAINT:    Mrs. Brown is a 67 y.o. female presenting for a a follow up.  She is status post retropubic sling placement on 12/20/2022    PRESENTING ILLNESS:    Leticia Brown is a 67 y.o. female who is presents with a history of stress incontinence who returns for follow up.  She states she is doing very well.  She decided not to go skiing after all and states she has been doing very well.  No pad use, no vaginal discharge or pain.  She feels like she empties and she is very pleased she had surgery done.  She states it has improved her quality of life.     She is still working as a .     REVIEW OF SYSTEMS:    Review of Systems   Constitutional: Negative.    HENT: Negative.     Eyes: Negative.    Respiratory: Negative.     Cardiovascular: Negative.    Gastrointestinal: Negative.    Genitourinary: Negative.    Musculoskeletal: Negative.    Skin: Negative.    Neurological: Negative.    Endo/Heme/Allergies: Negative.    Psychiatric/Behavioral: Negative.       PATIENT HISTORY:    Past Medical History:   Diagnosis Date    Anxiety disorder, unspecified     BCC (basal cell carcinoma)     Cancer     Hyperlipemia     Sleep apnea        Past Surgical History:   Procedure Laterality Date    ADENOIDECTOMY      back sx      BUNIONECTOMY      COLONOSCOPY  2005    diverticulosis    COLONOSCOPY N/A 03/25/2022    Procedure: COLONOSCOPY  Covid Test Rapid;  Surgeon: Marshall King MD;  Location: [facility];  Service: Endoscopy;  Laterality: N/A;    CREATION OF URETHRAL SLING USING POLYPROPYLENE TAPE BY RETROPUBIC APPROACH N/A 12/20/2022    Procedure: SLING, RETROPUBIC WITH SPARC;  Surgeon: Marcie Rea MD;  Location: [address];  Service: Urology;  Laterality: N/A;    CYSTOSCOPY N/A 12/20/2022    Procedure: CYSTOSCOPY;  Surgeon: Marcie Rea MD;  Location: [address];  Service: Urology;  Laterality: N/A;    NASAL SEPTOPLASTY N/A 11/05/2021    Procedure: SEPTOPLASTY, NOSE;  Surgeon: Ivring JIMENEZ  MD Samantha;  Location: [address];  Service: ENT;  Laterality: N/A;    NASAL TURBINATE REDUCTION Bilateral 11/05/2021    Procedure: REDUCTION, NASAL TURBINATE;  Surgeon: Irving Snigh MD;  Location: [address];  Service: ENT;  Laterality: Bilateral;    NECK SURGERY      TONSILLECTOMY      TYMPANOPLASTY         Family History   Problem Relation Age of Onset    Stroke Mother     No Known Problems Sister     No Known Problems Sister     Diabetes Brother     Breast cancer Maternal Cousin        Social History     Socioeconomic History    Marital status: Single   Tobacco Use    Smoking status: Never    Smokeless tobacco: Never   Substance and Sexual Activity    Alcohol use: Yes     Comment: once a month    Drug use: No     Social Determinants of Health     Financial Resource Strain: Low Risk     Difficulty of Paying Living Expenses: Not hard at all   Food Insecurity: No Food Insecurity    Worried About Running Out of Food in the Last Year: Never true    Ran Out of Food in the Last Year: Never true   Transportation Needs: No Transportation Needs    Lack of Transportation (Medical): No    Lack of Transportation (Non-Medical): No   Physical Activity: Insufficiently Active    Days of Exercise per Week: 5 days    Minutes of Exercise per Session: 20 min   Stress: No Stress Concern Present    Feeling of Stress : Not at all   Social Connections: Socially Integrated    Frequency of Communication with Friends and Family: More than three times a week    Frequency of Social Gatherings with Friends and Family: More than three times a week    Attends Anabaptism Services: More than 4 times per year    Active Member of Clubs or Organizations: No    Attends Club or Organization Meetings: More than 4 times per year    Marital Status: Living with partner   Housing Stability: Low Risk     Unable to Pay for Housing in the Last Year: No    Number of Places Lived in the Last Year: 1    Unstable Housing in the Last Year: No        Allergies:  Sulfa (sulfonamide antibiotics)    Medications:  Outpatient Encounter Medications as of 2023   Medication Sig Dispense Refill    acetaZOLAMIDE (DIAMOX) 125 MG Tab Take 125 mg by mouth. PRN for high altitude      atorvastatin (LIPITOR) 10 MG tablet TAKE 1 TABLET(10 MG) BY MOUTH EVERY EVENING 90 tablet 3    estradioL (ESTRACE) 1 MG tablet TAKE 1 TABLET(1 MG) BY MOUTH EVERY DAY 90 tablet 3    ferrous sulfate (FEOSOL) Tab tablet Take 1 tablet by mouth daily with breakfast.      multivitamin (THERAGRAN) per tablet Take 1 tablet by mouth once daily.      soft lens rinse,store solution (SALINE SOLUTION) Soln 1 application by Misc.(Non-Drug; Combo Route) route 2 (two) times a day. 710 mL 3    venlafaxine (EFFEXOR-XR) 150 MG Cp24 Take 1 capsule (150 mg total) by mouth once daily. 90 capsule 3    acyclovir (ZOVIRAX) 200 MG capsule Take 1 capsule (200 mg total) by mouth 3 (three) times daily. for 5 days (Patient not taking: Reported on 3/27/2023) 15 capsule 3    [] triamcinolone acetonide 0.1% (KENALOG) 0.1 % paste Place onto teeth 2 (two) times daily. 5 g 1    [DISCONTINUED] pilocarpine (SALAGEN, PILOCARPINE,) 5 MG Tab Take 1 tablet (5 mg total) by mouth 2 (two) times daily. 60 tablet 11     No facility-administered encounter medications on file as of 2023.         PHYSICAL EXAMINATION:    The patient generally appears in good health, is appropriately interactive, and is in no apparent distress.    Skin: No lesions.    Mental: Cooperative with normal affect.    Neuro: Grossly intact.    HEENT: Normal. No evidence of lymphadenopathy.    Chest:  normal inspiratory effort.    Abdomen: Soft, non-tender. No masses or organomegaly. Bladder is not palpable. No evidence of flank discomfort. No evidence of inguinal hernia.    Extremities: No clubbing, cyanosis, or edema    Normal external female genitalia  Urethral meatus is normal  Urethra and bladder are nontender to bimanual exam  Well supported  anteriorly and posteriorly   Uterus and cervix are normal  No adnexal masses    LABS:    Lab Results   Component Value Date    BUN 10 03/22/2023    CREATININE 0.8 03/22/2023       UA could not void    IMPRESSION:    Status post urologic surgery    PLAN:    1. Follow up in 6 months.     I spent 30 minutes with the patient of which more than half was spent in direct consultation with the patient in regards to our treatment and plan.

## 2023-05-08 ENCOUNTER — OFFICE VISIT (OUTPATIENT)
Dept: OTOLARYNGOLOGY | Facility: CLINIC | Age: 68
End: 2023-05-08
Payer: MEDICARE

## 2023-05-08 VITALS
BODY MASS INDEX: 29.9 KG/M2 | HEART RATE: 75 BPM | DIASTOLIC BLOOD PRESSURE: 55 MMHG | WEIGHT: 174.19 LBS | SYSTOLIC BLOOD PRESSURE: 102 MMHG

## 2023-05-08 DIAGNOSIS — R68.2 DRY MOUTH: Primary | ICD-10-CM

## 2023-05-08 PROCEDURE — 88305 TISSUE EXAM BY PATHOLOGIST: CPT | Performed by: PATHOLOGY

## 2023-05-08 PROCEDURE — 40490 BIOPSY OF LIP: CPT | Mod: S$PBB,,, | Performed by: OTOLARYNGOLOGY

## 2023-05-08 PROCEDURE — 99999 PR PBB SHADOW E&M-EST. PATIENT-LVL III: CPT | Mod: PBBFAC,,, | Performed by: OTOLARYNGOLOGY

## 2023-05-08 PROCEDURE — 40490 BIOPSY OF LIP: CPT | Mod: PBBFAC | Performed by: OTOLARYNGOLOGY

## 2023-05-08 PROCEDURE — 40490 PR BIOPSY OF LIP: ICD-10-PCS | Mod: S$PBB,,, | Performed by: OTOLARYNGOLOGY

## 2023-05-08 PROCEDURE — 99213 OFFICE O/P EST LOW 20 MIN: CPT | Mod: 25,S$PBB,, | Performed by: OTOLARYNGOLOGY

## 2023-05-08 PROCEDURE — 99999 PR PBB SHADOW E&M-EST. PATIENT-LVL III: ICD-10-PCS | Mod: PBBFAC,,, | Performed by: OTOLARYNGOLOGY

## 2023-05-08 PROCEDURE — 99213 PR OFFICE/OUTPT VISIT, EST, LEVL III, 20-29 MIN: ICD-10-PCS | Mod: 25,S$PBB,, | Performed by: OTOLARYNGOLOGY

## 2023-05-08 PROCEDURE — 88305 TISSUE EXAM BY PATHOLOGIST: ICD-10-PCS | Mod: 26,,, | Performed by: PATHOLOGY

## 2023-05-08 PROCEDURE — 99213 OFFICE O/P EST LOW 20 MIN: CPT | Mod: PBBFAC | Performed by: OTOLARYNGOLOGY

## 2023-05-08 PROCEDURE — 88305 TISSUE EXAM BY PATHOLOGIST: CPT | Mod: 26,,, | Performed by: PATHOLOGY

## 2023-05-08 NOTE — ASSESSMENT & PLAN NOTE
Lip biopsy and biopsy of oral mucosa lesion obtained today.  Will contact her with results.   (2) more than 100 beats/min

## 2023-05-08 NOTE — PROGRESS NOTES
CC: Oral lesions, dry mouth      HPI   67 y.o. female presents for evaluation of several lesions of the oral mucosa dry mouth.  She reports these have been present for several weeks.  They are not particularly painful.  She has seen Rheumatology is undergoing laboratory workup for Sjogren's disease.  Recent Sjogren's labs were unremarkable.    She was unable to tolerate pilocarpine.  She presents today for biopsy of her oral mucosal lesions and lower lip minor salivary glands.    Review of Systems   Constitutional: Negative for fatigue and unexpected weight change.   HENT: Per HPI.  Eyes: Negative for visual disturbance.   Respiratory: Negative for shortness of breath, hemoptysis   Cardiovascular: Negative for chest pain and palpitations.   Musculoskeletal: Negative for decreased ROM, back pain.   Skin: Negative for rash, sunburn, itching.   Neurological: Negative for dizziness and seizures.   Hematological: Negative for adenopathy. Does not bruise/bleed easily.   Endocrine: Negative for rapid weight loss/weight gain, heat/cold intolerance.     Past Medical History   Patient Active Problem List   Diagnosis    Stress incontinence    DJD (degenerative joint disease), cervical    Ataxic gait    Syringomyelia    Annular tear of lumbar disc    Foraminal stenosis of lumbosacral region    Lumbar radiculopathy    Deviated nasal septum    Other forms of angina pectoris    Unspecified lesions of oral mucosa    Dry mouth    Dry eyes    Chronic pain of left ankle    Dyslipidemia    Obesity (BMI 30.0-34.9)    Calcified granuloma of lung    Bilateral carotid artery disease    Recurrent major depressive disorder           Past Surgical History   Past Surgical History:   Procedure Laterality Date    ADENOIDECTOMY      back sx      BUNIONECTOMY      COLONOSCOPY  2005    diverticulosis    COLONOSCOPY N/A 03/25/2022    Procedure: COLONOSCOPY  Covid Test Rapid;  Surgeon: Marshall King MD;  Location: [facility];  Service:  Endoscopy;  Laterality: N/A;    CREATION OF URETHRAL SLING USING POLYPROPYLENE TAPE BY RETROPUBIC APPROACH N/A 12/20/2022    Procedure: SLING, RETROPUBIC WITH SPARC;  Surgeon: Marcie Rea MD;  Location: [facility] [address] [place] [address];  Service: Urology;  Laterality: N/A;    CYSTOSCOPY N/A 12/20/2022    Procedure: CYSTOSCOPY;  Surgeon: Marcie Rea MD;  Location: [facility] [address] [place] [address];  Service: Urology;  Laterality: N/A;    NASAL SEPTOPLASTY N/A 11/05/2021    Procedure: SEPTOPLASTY, NOSE;  Surgeon: Irving Singh MD;  Location: [facility] OR [address] [place] [address];  Service: ENT;  Laterality: N/A;    NASAL TURBINATE REDUCTION Bilateral 11/05/2021    Procedure: REDUCTION, NASAL TURBINATE;  Surgeon: Irving Singh MD;  Location: [facility] OR [address] [place] [address];  Service: ENT;  Laterality: Bilateral;    NECK SURGERY      TONSILLECTOMY      TYMPANOPLASTY           Family History   Family History   Problem Relation Age of Onset    Stroke Mother     No Known Problems Sister     No Known Problems Sister     Diabetes Brother     Breast cancer Maternal Cousin            Social History   .  Social History     Socioeconomic History    Marital status: Single   Tobacco Use    Smoking status: Never    Smokeless tobacco: Never   Substance and Sexual Activity    Alcohol use: Yes     Comment: once a month    Drug use: No     Social Determinants of Health     Financial Resource Strain: Low Risk     Difficulty of Paying Living Expenses: Not hard at all   Food Insecurity: No Food Insecurity    Worried About Running Out of Food in the Last Year: Never true    Ran Out of Food in the Last Year: Never true   Transportation Needs: No Transportation Needs    Lack of Transportation (Medical): No    Lack of Transportation (Non-Medical): No   Physical Activity: Insufficiently Active    Days of Exercise per Week: 5 days    Minutes of Exercise per Session: 20 min   Stress: No Stress Concern Present    Feeling of Stress : Not at all   Social Connections: Socially Integrated    Frequency  of Communication with Friends and Family: More than three times a week    Frequency of Social Gatherings with Friends and Family: More than three times a week    Attends Islam Services: More than 4 times per year    Active Member of Clubs or Organizations: No    Attends Club or Organization Meetings: More than 4 times per year    Marital Status: Living with partner   Housing Stability: Low Risk     Unable to Pay for Housing in the Last Year: No    Number of Places Lived in the Last Year: 1    Unstable Housing in the Last Year: No         Allergies   Review of patient's allergies indicates:   Allergen Reactions    Sulfa (sulfonamide antibiotics) Hives           Physical Exam     Vitals:    05/08/23 1114   BP: (!) 102/55   Pulse: 75         Body mass index is 29.9 kg/m².      General: AOx3, NAD   Respiratory:  Symmetric chest rise, normal effort  Oral Cavity:  Oral Tongue mobile, no lesions noted. Hard Palate WNL. No buccal or FOM lesions.  Several benign-appearing, sessile lesions of the oral mucosa most notable on the right lower lip.  No ulcers or other worrisome lesions.  Oropharynx:  No masses/lesions of the posterior pharyngeal wall. Tonsillar fossa without lesions. Soft palate without masses. Midline uvula.   Neck: No scars.  No cervical lymphadenopathy, thyromegaly or thyroid nodules.  Normal range of motion.    Face: House Brackmann I bilaterally.     Procedure: Biopsy of lower lip lesion and minor salivary glands.      Verbal assent obtained.  Local anesthesia achieved of lower lip.  Mucosal lesion biopsied with scissors and sent to pathology for permanent analysis.  Underlying minor salivary glands biopsied in similar manner.  There were also sent for permanent pathology.  Hemostasis was achieved with direct pressure and the wound was closed with Vicryl suture.  She tolerated the procedure well.    Assessment/Plan  Problem List Items Addressed This Visit          Other    Dry mouth - Primary     Lip  biopsy and biopsy of oral mucosa lesion obtained today.  Will contact her with results.           Relevant Orders    Specimen to Pathology ENT

## 2023-05-11 ENCOUNTER — PATIENT MESSAGE (OUTPATIENT)
Dept: OTOLARYNGOLOGY | Facility: CLINIC | Age: 68
End: 2023-05-11
Payer: MEDICARE

## 2023-05-15 LAB
FINAL PATHOLOGIC DIAGNOSIS: NORMAL
GROSS: NORMAL
Lab: NORMAL
MICROSCOPIC EXAM: NORMAL

## 2023-06-29 ENCOUNTER — E-VISIT (OUTPATIENT)
Dept: FAMILY MEDICINE | Facility: CLINIC | Age: 68
End: 2023-06-29
Payer: MEDICARE

## 2023-06-29 ENCOUNTER — PATIENT MESSAGE (OUTPATIENT)
Dept: FAMILY MEDICINE | Facility: CLINIC | Age: 68
End: 2023-06-29

## 2023-06-29 DIAGNOSIS — B96.89 BACTERIAL SINUSITIS: Primary | ICD-10-CM

## 2023-06-29 DIAGNOSIS — J32.9 BACTERIAL SINUSITIS: Primary | ICD-10-CM

## 2023-06-29 PROCEDURE — 99442 PR PHYSICIAN TELEPHONE EVALUATION 11-20 MIN: ICD-10-PCS | Mod: 95,S$PBB,, | Performed by: FAMILY MEDICINE

## 2023-06-29 PROCEDURE — 99442 PR PHYSICIAN TELEPHONE EVALUATION 11-20 MIN: CPT | Mod: 95,S$PBB,, | Performed by: FAMILY MEDICINE

## 2023-06-29 RX ORDER — GUAIFENESIN 600 MG/1
600 TABLET, EXTENDED RELEASE ORAL 2 TIMES DAILY
Qty: 20 TABLET | Refills: 0 | Status: SHIPPED | OUTPATIENT
Start: 2023-06-29 | End: 2023-07-09

## 2023-06-29 RX ORDER — AZITHROMYCIN 500 MG/1
500 TABLET, FILM COATED ORAL DAILY
Qty: 3 TABLET | Refills: 0 | Status: SHIPPED | OUTPATIENT
Start: 2023-06-29 | End: 2023-07-02

## 2023-06-29 RX ORDER — PROMETHAZINE HYDROCHLORIDE AND DEXTROMETHORPHAN HYDROBROMIDE 6.25; 15 MG/5ML; MG/5ML
5 SYRUP ORAL 4 TIMES DAILY PRN
Qty: 240 ML | Refills: 0 | Status: SHIPPED | OUTPATIENT
Start: 2023-06-29 | End: 2023-07-11

## 2023-06-29 NOTE — PROGRESS NOTES
The patient location is: [place]  The chief complaint leading to consultation is: cough    Visit type: E visit    Face to Face time with patient: 15 minutes of total time spent on the encounter, which includes face to face time and non-face to face time preparing to see the patient (eg, review of tests), Obtaining and/or reviewing separately obtained history, Documenting clinical information in the electronic or other health record, Independently interpreting results (not separately reported) and communicating results to the patient/family/caregiver, or Care coordination (not separately reported).         Each patient to whom he or she provides medical services by telemedicine is:  (1) informed of the relationship between the physician and patient and the respective role of any other health care provider with respect to management of the patient; and (2) notified that he or she may decline to receive medical services by telemedicine and may withdraw from such care at any time.    Notes:  68 years old female who was evaluated by telemedicine with cough tiredness and sore throat for the last week.  The cough is productive.  COVID testing at home was normal.    Leticia was seen today for uri.    Diagnoses and all orders for this visit:    Bacterial sinusitis  -     promethazine-dextromethorphan (PROMETHAZINE-DM) 6.25-15 mg/5 mL Syrp; Take 5 mLs by mouth 4 (four) times daily as needed (cough).  -     guaiFENesin (MUCINEX) 600 mg 12 hr tablet; Take 1 tablet (600 mg total) by mouth 2 (two) times daily. for 10 days  -     azithromycin (ZITHROMAX) 500 MG tablet; Take 1 tablet (500 mg total) by mouth once daily. for 3 days

## 2023-08-07 DIAGNOSIS — N95.1 MENOPAUSE SYNDROME: ICD-10-CM

## 2023-08-09 RX ORDER — ESTRADIOL 1 MG/1
TABLET ORAL
Qty: 90 TABLET | Refills: 3 | Status: SHIPPED | OUTPATIENT
Start: 2023-08-09 | End: 2023-09-22 | Stop reason: SDUPTHER

## 2023-09-22 ENCOUNTER — OFFICE VISIT (OUTPATIENT)
Dept: FAMILY MEDICINE | Facility: CLINIC | Age: 68
End: 2023-09-22
Payer: MEDICARE

## 2023-09-22 ENCOUNTER — HOSPITAL ENCOUNTER (OUTPATIENT)
Dept: RADIOLOGY | Facility: HOSPITAL | Age: 68
Discharge: HOME | End: 2023-09-22
Attending: FAMILY MEDICINE
Payer: MEDICARE

## 2023-09-22 VITALS
SYSTOLIC BLOOD PRESSURE: 108 MMHG | BODY MASS INDEX: 29.24 KG/M2 | DIASTOLIC BLOOD PRESSURE: 82 MMHG | OXYGEN SATURATION: 98 % | HEIGHT: 64 IN | WEIGHT: 171.31 LBS | HEART RATE: 81 BPM

## 2023-09-22 DIAGNOSIS — E78.5 DYSLIPIDEMIA: ICD-10-CM

## 2023-09-22 DIAGNOSIS — Z12.31 ENCOUNTER FOR SCREENING MAMMOGRAM FOR BREAST CANCER: ICD-10-CM

## 2023-09-22 DIAGNOSIS — D53.9 NUTRITIONAL ANEMIA: ICD-10-CM

## 2023-09-22 DIAGNOSIS — R68.2 DRY MOUTH: Primary | ICD-10-CM

## 2023-09-22 DIAGNOSIS — R68.2 DRY MOUTH: ICD-10-CM

## 2023-09-22 DIAGNOSIS — F41.9 ANXIETY: ICD-10-CM

## 2023-09-22 DIAGNOSIS — N95.1 MENOPAUSE SYNDROME: ICD-10-CM

## 2023-09-22 DIAGNOSIS — F33.1 MODERATE EPISODE OF RECURRENT MAJOR DEPRESSIVE DISORDER: ICD-10-CM

## 2023-09-22 PROCEDURE — 77063 BREAST TOMOSYNTHESIS BI: CPT | Mod: 26,,, | Performed by: RADIOLOGY

## 2023-09-22 PROCEDURE — 99999 PR PBB SHADOW E&M-EST. PATIENT-LVL III: CPT | Mod: PBBFAC,,, | Performed by: FAMILY MEDICINE

## 2023-09-22 PROCEDURE — 99215 OFFICE O/P EST HI 40 MIN: CPT | Mod: S$PBB,,, | Performed by: FAMILY MEDICINE

## 2023-09-22 PROCEDURE — 99215 PR OFFICE/OUTPT VISIT, EST, LEVL V, 40-54 MIN: ICD-10-PCS | Mod: S$PBB,,, | Performed by: FAMILY MEDICINE

## 2023-09-22 PROCEDURE — 77067 MAMMO DIGITAL SCREENING BILAT WITH TOMO: ICD-10-PCS | Mod: 26,,, | Performed by: RADIOLOGY

## 2023-09-22 PROCEDURE — 77067 SCR MAMMO BI INCL CAD: CPT | Mod: TC

## 2023-09-22 PROCEDURE — 77067 SCR MAMMO BI INCL CAD: CPT | Mod: 26,,, | Performed by: RADIOLOGY

## 2023-09-22 PROCEDURE — 77063 MAMMO DIGITAL SCREENING BILAT WITH TOMO: ICD-10-PCS | Mod: 26,,, | Performed by: RADIOLOGY

## 2023-09-22 PROCEDURE — 99999 PR PBB SHADOW E&M-EST. PATIENT-LVL III: ICD-10-PCS | Mod: PBBFAC,,, | Performed by: FAMILY MEDICINE

## 2023-09-22 PROCEDURE — 99213 OFFICE O/P EST LOW 20 MIN: CPT | Mod: PBBFAC,PO | Performed by: FAMILY MEDICINE

## 2023-09-22 RX ORDER — ATORVASTATIN CALCIUM 10 MG/1
10 TABLET, FILM COATED ORAL NIGHTLY
Qty: 90 TABLET | Refills: 3 | Status: SHIPPED | OUTPATIENT
Start: 2023-09-22

## 2023-09-22 RX ORDER — VENLAFAXINE HYDROCHLORIDE 150 MG/1
150 CAPSULE, EXTENDED RELEASE ORAL DAILY
Qty: 90 CAPSULE | Refills: 3 | Status: SHIPPED | OUTPATIENT
Start: 2023-09-22

## 2023-09-22 RX ORDER — PILOCARPINE HYDROCHLORIDE 5 MG/1
5 TABLET, FILM COATED ORAL 2 TIMES DAILY
Qty: 60 TABLET | Refills: 0 | Status: SHIPPED | OUTPATIENT
Start: 2023-09-22 | End: 2023-09-22 | Stop reason: SDUPTHER

## 2023-09-22 RX ORDER — PILOCARPINE HYDROCHLORIDE 5 MG/1
5 TABLET, FILM COATED ORAL 2 TIMES DAILY
Qty: 60 TABLET | Refills: 0 | Status: SHIPPED | OUTPATIENT
Start: 2023-09-22 | End: 2023-09-25

## 2023-09-22 RX ORDER — FERROUS SULFATE 325(65) MG
325 TABLET ORAL
Qty: 60 TABLET | Refills: 0 | Status: SHIPPED | OUTPATIENT
Start: 2023-09-22 | End: 2023-09-25

## 2023-09-22 RX ORDER — ESTRADIOL 1 MG/1
1 TABLET ORAL DAILY
Qty: 90 TABLET | Refills: 3 | Status: SHIPPED | OUTPATIENT
Start: 2023-09-22

## 2023-09-22 NOTE — PROGRESS NOTES
Subjective     Patient ID: Leticia Brown is a 68 y.o. female.    Chief Complaint: Follow-up    68 years old who came to the clinic with persistent dry mouth even using Biotene at home.  Sjogren disease screening was negative before.  Patient also reports swelling of her tongue.  Patient with good compliance with cholesterol regimen.  Patient with mild anemia but stable in comparison with previous reports.  Depression currently stable using Effexor.  She is due for her mammogram.  Patient currently on estradiol to help with menopause.    Follow-up      Review of Systems   Constitutional: Negative.    HENT: Negative.  Positive for mouth dryness.    Eyes: Negative.    Respiratory: Negative.     Gastrointestinal: Negative.    Genitourinary: Negative.    Musculoskeletal: Negative.    Neurological: Negative.    Psychiatric/Behavioral: Negative.            Objective     Physical Exam  Constitutional:       General: She is not in acute distress.     Appearance: She is well-developed. She is not diaphoretic.   HENT:      Head: Normocephalic and atraumatic.      Right Ear: External ear normal.      Left Ear: External ear normal.      Nose: Nose normal.      Mouth/Throat:      Mouth: Mucous membranes are dry.      Pharynx: No oropharyngeal exudate.   Eyes:      General: No scleral icterus.        Right eye: No discharge.         Left eye: No discharge.      Conjunctiva/sclera: Conjunctivae normal.      Pupils: Pupils are equal, round, and reactive to light.   Neck:      Thyroid: No thyromegaly.      Vascular: No JVD.      Trachea: No tracheal deviation.   Cardiovascular:      Rate and Rhythm: Normal rate and regular rhythm.      Heart sounds: Normal heart sounds. No murmur heard.     No friction rub. No gallop.   Pulmonary:      Effort: Pulmonary effort is normal. No respiratory distress.      Breath sounds: Normal breath sounds. No stridor. No wheezing or rales.   Chest:      Chest wall: No tenderness.   Abdominal:       General: Bowel sounds are normal. There is no distension.      Palpations: Abdomen is soft. There is no mass.      Tenderness: There is no abdominal tenderness. There is no guarding or rebound.   Musculoskeletal:         General: No tenderness. Normal range of motion.      Cervical back: Normal range of motion and neck supple.   Lymphadenopathy:      Cervical: No cervical adenopathy.   Skin:     General: Skin is warm and dry.      Coloration: Skin is not pale.      Findings: No erythema or rash.   Neurological:      Mental Status: She is alert and oriented to person, place, and time.      Cranial Nerves: No cranial nerve deficit.      Motor: No abnormal muscle tone.      Coordination: Coordination normal.      Deep Tendon Reflexes: Reflexes are normal and symmetric.   Psychiatric:         Behavior: Behavior normal.         Thought Content: Thought content normal.         Judgment: Judgment normal.            Assessment and Plan     1. Dry mouth  -     Discontinue: pilocarpine (SALAGEN, PILOCARPINE,) 5 MG Tab; Take 1 tablet (5 mg total) by mouth 2 (two) times daily.  Dispense: 60 tablet; Refill: 0  -     pilocarpine (SALAGEN, PILOCARPINE,) 5 MG Tab; Take 1 tablet (5 mg total) by mouth 2 (two) times daily.  Dispense: 60 tablet; Refill: 0    2. Dyslipidemia  -     Urinalysis; Future  -     Comprehensive Metabolic Panel; Future; Expected date: 09/22/2023  -     Lipid Panel; Future; Expected date: 09/22/2023  -     TSH; Future; Expected date: 09/22/2023  -     CBC Auto Differential; Future; Expected date: 09/22/2023  -     atorvastatin (LIPITOR) 10 MG tablet; Take 1 tablet (10 mg total) by mouth every evening.  Dispense: 90 tablet; Refill: 3    3. Nutritional anemia  -     CBC Auto Differential; Future; Expected date: 09/22/2023  -     ferrous sulfate (FEOSOL) 325 mg (65 mg iron) Tab tablet; Take 1 tablet (325 mg total) by mouth daily with breakfast.  Dispense: 60 tablet; Refill: 0    4. Menopause syndrome  -      estradioL (ESTRACE) 1 MG tablet; Take 1 tablet (1 mg total) by mouth once daily.  Dispense: 90 tablet; Refill: 3    5. Anxiety  -     venlafaxine (EFFEXOR-XR) 150 MG Cp24; Take 1 capsule (150 mg total) by mouth once daily.  Dispense: 90 capsule; Refill: 3    6. Moderate episode of recurrent major depressive disorder  -     venlafaxine (EFFEXOR-XR) 150 MG Cp24; Take 1 capsule (150 mg total) by mouth once daily.  Dispense: 90 capsule; Refill: 3    7. Encounter for screening mammogram for breast cancer  -     Mammo Digital Screening Bilat w/ [facility]; Future; Expected date: 09/22/2023                 Follow up in about 6 months (around 3/22/2024), or if symptoms worsen or fail to improve.

## 2023-09-25 RX ORDER — PILOCARPINE HYDROCHLORIDE 5 MG/1
5 TABLET, FILM COATED ORAL 2 TIMES DAILY
Qty: 180 TABLET | Refills: 0 | Status: SHIPPED | OUTPATIENT
Start: 2023-09-25

## 2023-09-25 RX ORDER — FERROUS SULFATE 325(65) MG
TABLET ORAL
Qty: 90 TABLET | Refills: 3 | Status: SHIPPED | OUTPATIENT
Start: 2023-09-25

## 2023-12-13 ENCOUNTER — NURSE TRIAGE (OUTPATIENT)
Dept: ADMINISTRATIVE | Facility: CLINIC | Age: 68
End: 2023-12-13
Payer: MEDICARE

## 2023-12-14 NOTE — TELEPHONE ENCOUNTER
Pt reports she is fatigued, w/ nasal allergies and puffy eyes. Reports feeling she is not getting enough oxygen and her respirations are slow/shallow/weak. She was advised, per protocol. Does not want to go to the ED for evaluation due to a past experience. She would like follow up in this regard.    Reason for Disposition   Sounds like a life-threatening emergency to the triager    Additional Information   [1] Wheezing (high pitched whistling sound) AND [2] no history of asthma   Negative: Stridor (harsh sound while breathing in)   Negative: Slow, shallow and weak breathing   Negative: [1] Breathing stopped AND [2] hasn't returned   Negative: Choking on something   Negative: Bluish (or gray) lips or face now   Negative: Difficult to awaken or acting confused (e.g., disoriented, slurred speech)   Negative: Passed out (i.e., lost consciousness, collapsed and was not responding)   Negative: Wheezing started suddenly after medicine, an allergic food or bee sting   Negative: SEVERE difficulty breathing (e.g., struggling for each breath, speaks in single words)    Protocols used: Nasal Allergies (Hay Fever)-A-AH, Breathing Difficulty-A-AH

## 2023-12-20 ENCOUNTER — OFFICE VISIT (OUTPATIENT)
Dept: URGENT CARE | Facility: CLINIC | Age: 68
End: 2023-12-20
Payer: MEDICARE

## 2023-12-20 VITALS
BODY MASS INDEX: 29.19 KG/M2 | HEIGHT: 64 IN | OXYGEN SATURATION: 96 % | WEIGHT: 171 LBS | TEMPERATURE: 98 F | HEART RATE: 75 BPM | DIASTOLIC BLOOD PRESSURE: 68 MMHG | RESPIRATION RATE: 19 BRPM | SYSTOLIC BLOOD PRESSURE: 113 MMHG

## 2023-12-20 DIAGNOSIS — R07.9 CHEST PAIN, UNSPECIFIED TYPE: ICD-10-CM

## 2023-12-20 DIAGNOSIS — K21.9 GASTROESOPHAGEAL REFLUX DISEASE WITHOUT ESOPHAGITIS: ICD-10-CM

## 2023-12-20 DIAGNOSIS — R11.0 NAUSEA: Primary | ICD-10-CM

## 2023-12-20 LAB
CTP QC/QA: YES
POC MOLECULAR INFLUENZA A AGN: NEGATIVE
POC MOLECULAR INFLUENZA B AGN: NEGATIVE

## 2023-12-20 PROCEDURE — 93000 ELECTROCARDIOGRAM COMPLETE: CPT | Mod: S$GLB,,, | Performed by: NURSE PRACTITIONER

## 2023-12-20 PROCEDURE — 93000 EKG 12-LEAD: ICD-10-PCS | Mod: S$GLB,,, | Performed by: NURSE PRACTITIONER

## 2023-12-20 PROCEDURE — 71046 X-RAY EXAM CHEST 2 VIEWS: CPT | Mod: FY,S$GLB,, | Performed by: RADIOLOGY

## 2023-12-20 PROCEDURE — 99213 PR OFFICE/OUTPT VISIT, EST, LEVL III, 20-29 MIN: ICD-10-PCS | Mod: S$GLB,,, | Performed by: NURSE PRACTITIONER

## 2023-12-20 PROCEDURE — 87502 INFLUENZA DNA AMP PROBE: CPT | Mod: QW,S$GLB,, | Performed by: NURSE PRACTITIONER

## 2023-12-20 PROCEDURE — 71046 XR CHEST PA AND LATERAL: ICD-10-PCS | Mod: FY,S$GLB,, | Performed by: RADIOLOGY

## 2023-12-20 PROCEDURE — 99213 OFFICE O/P EST LOW 20 MIN: CPT | Mod: S$GLB,,, | Performed by: NURSE PRACTITIONER

## 2023-12-20 PROCEDURE — 87502 POCT INFLUENZA A/B MOLECULAR: ICD-10-PCS | Mod: QW,S$GLB,, | Performed by: NURSE PRACTITIONER

## 2023-12-20 RX ORDER — OMEPRAZOLE 20 MG/1
20 TABLET, DELAYED RELEASE ORAL DAILY
COMMUNITY

## 2023-12-20 NOTE — PATIENT INSTRUCTIONS
Advised to start Prilosec as previously prescribed.    Chest Tightness Discharge    Although there are no signs of an acute cardiac issue, with your history it is prudent to seek care if your symptoms persist or get worse.  Please go to the Emergency Department for any concerns or worsening of condition such as:  Shortness of breath, difficulty breathing, or breathing fast  Chest pain gets worse when you breathe  Severe pain that comes on suddenly or lasts more than an hour  Dizziness, weakness, or fainting  Fever   Left Arm Pain, increased and (pumping) heart beat,  Visual Disturbances,left  jaw, back or shoulder pain  Follow up with your PCP in the next 2-3 days for no improvement in symptoms.    If you  smoke, please stop smoking.     Reflux Discharge Instructions  If you were not prescribed any reflux medications, it is OK to take over the counter Pepcid or Zantac or Prilosec as directed.  Avoid fatty,greasy,fried,spicy foods. Avoid Alcohol. Avoid laying down for 2 hours after eating.   If your symptoms continue, make sure to follow up with a gastroenterologist to get an Upper GI scope        You must understand that you've received an Urgent Care treatment only and that you may be released before all your medical problems are known or treated. You, the patient, will arrange for follow up care as instructed.  Follow up with your PCP or specialty clinic as directed in the next 1-2 weeks if not improved or as needed.  You can call [phone] to schedule an appointment with the appropriate provider.  If your condition worsens we recommend that you receive another evaluation at the emergency room immediately or contact your primary medical clinics after hours call service to discuss your concerns.  Please return here or go to the Emergency Department for any concerns or worsening of condition.    If you were prescribed a narcotic or controlled medication, do not drive or operate heavy equipment or machinery  while taking these medications.    Thank you for choosing [facility]!    Our goal in the Urgent Care is to always provide outstanding medical care. You may receive a survey by mail or e-mail in the next week regarding your experience today. We would greatly appreciate you completing and returning the survey. Your feedback provides us with a way to recognize our staff who provide very good care, and it helps us learn how to improve when your experience was below our aspiration of excellence.      We appreciate you trusting us with your medical care. We hope you feel better soon. We will be happy to take care of you for all of your future medical needs.   This note was prepared using voice-recognition software.  Although efforts are made to proofread the note, some errors may persist in the final document.     Sincerely,    Uriah Rose DNP, FNP-C

## 2023-12-20 NOTE — PROGRESS NOTES
"Subjective:      Patient ID: Leticia Brown is a 68 y.o. female.    Vitals:  height is 5' 4" (1.626 m) and weight is 77.6 kg (171 lb). Her oral temperature is 98 °F (36.7 °C). Her blood pressure is 113/68 and her pulse is 75. Her respiration is 19 and oxygen saturation is 96%.     Chief Complaint: Nausea    69 y/o presents with a complaint of chest ache and acid reflux, nasal congestion, SOB, and nausea.  Reports onset of symptoms, one week. Denies fever, chills, or palpitations.  States she is prescribed Prilosec, but does not take.  PMH of bilateral carotid disease.      URI   This is a new problem. The current episode started 1 to 4 weeks ago. There has been no fever. Associated symptoms include chest pain, congestion, nausea, rhinorrhea and sneezing. Pertinent negatives include no abdominal pain, coughing, diarrhea, dysuria, ear pain, headaches, neck pain, rash, sinus pain, sore throat, vomiting or wheezing. She has tried nothing for the symptoms.       Constitution: Positive for sweating and fatigue. Negative for activity change, appetite change, chills, fever, generalized weakness and international travel in last 60 days.   HENT:  Positive for congestion. Negative for ear pain, nosebleeds, postnasal drip, sinus pain, sinus pressure and sore throat.    Neck: Negative for neck pain.   Cardiovascular:  Positive for chest pain. Negative for chest trauma, leg swelling, palpitations, sob on exertion and passing out.   Respiratory:  Positive for shortness of breath. Negative for chest tightness, cough, sputum production, COPD, stridor, wheezing and asthma.    Gastrointestinal:  Positive for nausea and heartburn. Negative for abdominal pain, abdominal bloating, history of abdominal surgery, vomiting and diarrhea.   Genitourinary:  Negative for dysuria.   Skin:  Negative for rash.   Allergic/Immunologic: Positive for sneezing. Negative for asthma.   Neurological:  Negative for headaches.      Objective:     Physical " Exam   Constitutional: She is oriented to person, place, and time. She appears well-developed. She is cooperative.  Non-toxic appearance. She does not appear ill. No distress.   HENT:   Head: Normocephalic and atraumatic.   Ears:   Right Ear: Hearing, tympanic membrane, external ear and ear canal normal. impacted cerumen  Left Ear: Hearing, tympanic membrane, external ear and ear canal normal. impacted cerumen  Nose: Nose normal. No mucosal edema, rhinorrhea, nasal deformity or congestion. No epistaxis. Right sinus exhibits no maxillary sinus tenderness and no frontal sinus tenderness. Left sinus exhibits no maxillary sinus tenderness and no frontal sinus tenderness.   Mouth/Throat: Uvula is midline, oropharynx is clear and moist and mucous membranes are normal. Mucous membranes are moist. No trismus in the jaw. Normal dentition. No uvula swelling. No oropharyngeal exudate or posterior oropharyngeal erythema.   Eyes: Conjunctivae and lids are normal. Right eye exhibits no discharge. Left eye exhibits no discharge. No scleral icterus.   Neck: Trachea normal and phonation normal. Neck supple.   Cardiovascular: Normal rate, regular rhythm, normal heart sounds and normal pulses.   Pulmonary/Chest: Effort normal and breath sounds normal. No stridor. No respiratory distress. She has no wheezes. She has no rhonchi. She has no rales. She exhibits no tenderness.   Abdominal: Normal appearance and bowel sounds are normal. She exhibits no distension and no mass. Soft. There is no abdominal tenderness.   Musculoskeletal: Normal range of motion.         General: No deformity. Normal range of motion.   Neurological: She is alert and oriented to person, place, and time. She exhibits normal muscle tone. Coordination normal.   Skin: Skin is warm, dry, intact, not diaphoretic and not pale.   Psychiatric: Her speech is normal and behavior is normal. Judgment and thought content normal.   Nursing note and vitals  reviewed.      Assessment:     1. Nausea    2. Chest pain, unspecified type    3. Gastroesophageal reflux disease without esophagitis      EXAMINATION:  XR CHEST PA AND LATERAL     CLINICAL HISTORY:  Chest pain, unspecified     TECHNIQUE:  PA and lateral views of the chest were performed.     COMPARISON:  November 10, 2022     FINDINGS:  Normal heart size.  Normal pulmonary vasculature.  No focal infiltrates, pleural effusions, or pneumothorax.  Reconfirmed remote granulomatous changes with calcified nodes and granulomas.  Reconfirmed of cervical fusion hardware.  No acute bony abnormalities.     Impression:     No active cardiac or pulmonary findings.  No new or detrimental changes noted with respect to the chest when compared to October 31, 2019.        Electronically signed by: Shantanu Hawkins  Date:                                            12/20/2023  Time:                                           15:23      Results for orders placed or performed in visit on 12/20/23   POCT Influenza A/B MOLECULAR   Result Value Ref Range    POC Molecular Influenza A Ag Negative Negative, Not Reported    POC Molecular Influenza B Ag Negative Negative, Not Reported     Acceptable Yes       Results for orders placed or performed in visit on 12/20/23   IN OFFICE EKG 12-LEAD (to [facility])    Collection Time: 12/20/23  2:45 PM    Narrative    Test Reason : R07.9,    Vent. Rate : 070 BPM     Atrial Rate : 070 BPM     P-R Int : 150 ms          QRS Dur : 062 ms      QT Int : 410 ms       P-R-T Axes : 059 003 031 degrees     QTc Int : 442 ms    Normal sinus rhythm  Low voltage QRS  Borderline Abnormal ECG  When compared with ECG of 10-NOV-2022 14:36,  Nonspecific T wave abnormality no longer evident in Anterior leads  Confirmed by Valentina Hurley MD, Jose ([id number]) on 12/21/2023 4:14:55 PM    Referred By: ANA ALVARADO           Confirmed By:Abran Hurley,      Plan:     Nausea  -     POCT Influenza A/B MOLECULAR    Chest pain,  unspecified type  -     IN OFFICE EKG 12-LEAD (to [place])  -     XR CHEST PA AND LATERAL; Future; Expected date: 12/20/2023    Gastroesophageal reflux disease without esophagitis      Advised to start Prilosec as previously prescribed.    Chest Tightness Discharge    Although there are no signs of an acute cardiac issue, with your history it is prudent to seek care if your symptoms persist or get worse.  Please go to the Emergency Department for any concerns or worsening of condition such as:  Shortness of breath, difficulty breathing, or breathing fast  Chest pain gets worse when you breathe  Severe pain that comes on suddenly or lasts more than an hour  Dizziness, weakness, or fainting  Fever   Left Arm Pain, increased and (pumping) heart beat,  Visual Disturbances,left  jaw, back or shoulder pain  Follow up with your PCP in the next 2-3 days for no improvement in symptoms.    If you  smoke, please stop smoking.     Reflux Discharge Instructions  If you were not prescribed any reflux medications, it is OK to take over the counter Pepcid or Zantac or Prilosec as directed.  Avoid fatty,greasy,fried,spicy foods. Avoid Alcohol. Avoid laying down for 2 hours after eating.   If your symptoms continue, make sure to follow up with a gastroenterologist to get an Upper GI scope        You must understand that you've received an Urgent Care treatment only and that you may be released before all your medical problems are known or treated. You, the patient, will arrange for follow up care as instructed.  Follow up with your PCP or specialty clinic as directed in the next 1-2 weeks if not improved or as needed.  You can call [phone] to schedule an appointment with the appropriate provider.  If your condition worsens we recommend that you receive another evaluation at the emergency room immediately or contact your primary medical clinics after hours call service to discuss your concerns.  Please return here or go to the  Emergency Department for any concerns or worsening of condition.    If you were prescribed a narcotic or controlled medication, do not drive or operate heavy equipment or machinery while taking these medications.    Thank you for choosing [facility]!    Our goal in the Urgent Care is to always provide outstanding medical care. You may receive a survey by mail or e-mail in the next week regarding your experience today. We would greatly appreciate you completing and returning the survey. Your feedback provides us with a way to recognize our staff who provide very good care, and it helps us learn how to improve when your experience was below our aspiration of excellence.      We appreciate you trusting us with your medical care. We hope you feel better soon. We will be happy to take care of you for all of your future medical needs.   This note was prepared using voice-recognition software.  Although efforts are made to proofread the note, some errors may persist in the final document.     Sincerely,    Uriah Rose DNP, FNP-C        Additional MDM:     Heart Failure Score:   COPD = No

## 2024-03-15 ENCOUNTER — TELEPHONE (OUTPATIENT)
Dept: FAMILY MEDICINE | Facility: CLINIC | Age: 69
End: 2024-03-15
Payer: MEDICARE

## 2024-03-15 NOTE — TELEPHONE ENCOUNTER
Spoke with patient informing her appointment will have to be reschedule due to provide being out. Patient rescheduled appointment 3/8.

## 2024-04-04 ENCOUNTER — PATIENT MESSAGE (OUTPATIENT)
Dept: ADMINISTRATIVE | Facility: OTHER | Age: 69
End: 2024-04-04
Payer: MEDICARE

## 2024-05-08 ENCOUNTER — OFFICE VISIT (OUTPATIENT)
Dept: FAMILY MEDICINE | Facility: CLINIC | Age: 69
End: 2024-05-08
Payer: MEDICARE

## 2024-05-08 VITALS
HEART RATE: 67 BPM | HEIGHT: 64 IN | DIASTOLIC BLOOD PRESSURE: 88 MMHG | OXYGEN SATURATION: 98 % | BODY MASS INDEX: 27.63 KG/M2 | SYSTOLIC BLOOD PRESSURE: 100 MMHG | WEIGHT: 161.81 LBS

## 2024-05-08 DIAGNOSIS — H00.012 HORDEOLUM EXTERNUM OF RIGHT LOWER EYELID: ICD-10-CM

## 2024-05-08 DIAGNOSIS — G95.0 SYRINGOMYELIA: ICD-10-CM

## 2024-05-08 DIAGNOSIS — Z78.0 ASYMPTOMATIC MENOPAUSE: ICD-10-CM

## 2024-05-08 DIAGNOSIS — E78.5 HYPERLIPIDEMIA, UNSPECIFIED HYPERLIPIDEMIA TYPE: ICD-10-CM

## 2024-05-08 DIAGNOSIS — J84.10 CALCIFIED GRANULOMA OF LUNG: ICD-10-CM

## 2024-05-08 DIAGNOSIS — L98.9 FACIAL SKIN LESION: ICD-10-CM

## 2024-05-08 DIAGNOSIS — I20.89 OTHER FORMS OF ANGINA PECTORIS: ICD-10-CM

## 2024-05-08 DIAGNOSIS — M35.00 SJOGREN SYNDROME, UNSPECIFIED: ICD-10-CM

## 2024-05-08 DIAGNOSIS — K21.9 GASTROESOPHAGEAL REFLUX DISEASE WITHOUT ESOPHAGITIS: ICD-10-CM

## 2024-05-08 DIAGNOSIS — F33.1 MODERATE EPISODE OF RECURRENT MAJOR DEPRESSIVE DISORDER: Primary | ICD-10-CM

## 2024-05-08 DIAGNOSIS — I77.9 BILATERAL CAROTID ARTERY DISEASE, UNSPECIFIED TYPE: ICD-10-CM

## 2024-05-08 PROCEDURE — 99214 OFFICE O/P EST MOD 30 MIN: CPT | Mod: PBBFAC,PO | Performed by: FAMILY MEDICINE

## 2024-05-08 PROCEDURE — 99215 OFFICE O/P EST HI 40 MIN: CPT | Mod: S$PBB,,, | Performed by: FAMILY MEDICINE

## 2024-05-08 PROCEDURE — 99999 PR PBB SHADOW E&M-EST. PATIENT-LVL IV: CPT | Mod: PBBFAC,,, | Performed by: FAMILY MEDICINE

## 2024-05-08 RX ORDER — ERYTHROMYCIN 5 MG/G
OINTMENT OPHTHALMIC 2 TIMES DAILY
Qty: 3.5 G | Refills: 0 | Status: SHIPPED | OUTPATIENT
Start: 2024-05-08 | End: 2024-05-15

## 2024-05-08 NOTE — PROGRESS NOTES
Subjective     Patient ID: Leticia Brown is a 68 y.o. female.    Chief Complaint: No chief complaint on file.    68 years old female came to the clinic for cholesterol check.  Last cholesterol was stable.  No chest pain, palpitation, orthopnea or PND.  No flare ups of depression.  Patient still with dry mouth associated with Sjogren syndrome.  She reports improvement of the dry mouth using anti reflux medicine.  Patient previously diagnosed with syringomyelia with no flare ups.  Granuloma of the lung in the past no significant symptoms.  Patient previously diagnosed with basal cell carcinoma.  Patient wants facial lesions to be check by the dermatologist.  Patient with right eye stye for the last week.  She is due for her bone density.  Patient currently with statin therapy for carotic disorder also.    Hyperlipidemia  This is a chronic problem. The current episode started more than 1 year ago. The problem is controlled. Recent lipid tests were reviewed and are normal. She has no history of chronic renal disease, diabetes, hypothyroidism, liver disease or nephrotic syndrome. There are no known factors aggravating her hyperlipidemia. Pertinent negatives include no chest pain or shortness of breath. Current antihyperlipidemic treatment includes statins. The current treatment provides significant improvement of lipids. There are no compliance problems.  Risk factors for coronary artery disease include post-menopausal.   Depression  Visit Type: follow-up  Patient presents with the following symptoms: dry mouth.  Patient is not experiencing: anhedonia, decreased concentration, feelings of hopelessness, feelings of worthlessness, shortness of breath, suicidal ideas and suicidal planning.  Frequency of symptoms: rarely   Severity: mild   Sleep quality: good  Compliance with medications:  %      Review of Systems   Constitutional: Negative.    Eyes: Negative.    Respiratory: Negative.  Negative for shortness of  breath.    Cardiovascular:  Negative for chest pain.   Gastrointestinal: Negative.    Genitourinary: Negative.    Musculoskeletal: Negative.    Neurological: Negative.    Psychiatric/Behavioral:  Positive for depression. Negative for decreased concentration and suicidal ideas.           Objective     Physical Exam  Constitutional:       General: She is not in acute distress.     Appearance: She is well-developed. She is not diaphoretic.   HENT:      Head: Normocephalic and atraumatic.      Right Ear: External ear normal.      Left Ear: External ear normal.      Nose: Nose normal.      Mouth/Throat:      Pharynx: No oropharyngeal exudate.   Eyes:      General: No scleral icterus.        Right eye: Hordeolum present. No discharge.         Left eye: No discharge.      Conjunctiva/sclera: Conjunctivae normal.      Pupils: Pupils are equal, round, and reactive to light.   Neck:      Thyroid: No thyromegaly.      Vascular: No JVD.      Trachea: No tracheal deviation.   Cardiovascular:      Rate and Rhythm: Normal rate and regular rhythm.      Heart sounds: Normal heart sounds. No murmur heard.     No friction rub. No gallop.   Pulmonary:      Effort: Pulmonary effort is normal. No respiratory distress.      Breath sounds: Normal breath sounds. No stridor. No wheezing or rales.   Chest:      Chest wall: No tenderness.   Abdominal:      General: Bowel sounds are normal. There is no distension.      Palpations: Abdomen is soft. There is no mass.      Tenderness: There is no abdominal tenderness. There is no guarding or rebound.   Musculoskeletal:         General: No tenderness. Normal range of motion.      Cervical back: Normal range of motion and neck supple.   Lymphadenopathy:      Cervical: No cervical adenopathy.   Skin:     General: Skin is warm and dry.      Coloration: Skin is not pale.      Findings: No erythema or rash.   Neurological:      Mental Status: She is alert and oriented to person, place, and time.       Cranial Nerves: No cranial nerve deficit.      Motor: No abnormal muscle tone.      Coordination: Coordination normal.      Deep Tendon Reflexes: Reflexes are normal and symmetric.   Psychiatric:         Behavior: Behavior normal.         Thought Content: Thought content normal.         Judgment: Judgment normal.            Assessment and Plan     1. Moderate episode of recurrent major depressive disorder  -     Urinalysis; Future  -     Comprehensive Metabolic Panel; Future; Expected date: 05/08/2024  -     TSH; Future; Expected date: 05/08/2024  -     CBC Auto Differential; Future; Expected date: 05/08/2024    2. Sjogren syndrome, unspecified    3. Calcified granuloma of lung  Overview:  As seen on CXR on 11/10/2022      4. Syringomyelia    5. Other forms of angina pectoris    6. Bilateral carotid artery disease, unspecified type  Overview:  Stenosis seen on carotid US on 5/26/2015      7. Gastroesophageal reflux disease without esophagitis    8. Facial skin lesion    9. Hordeolum externum of right lower eyelid  -     erythromycin (ROMYCIN) ophthalmic ointment; Place into the right eye 2 (two) times daily. for 7 days  Dispense: 3.5 g; Refill: 0    10. Hyperlipidemia, unspecified hyperlipidemia type  -     Urinalysis; Future  -     Comprehensive Metabolic Panel; Future; Expected date: 05/08/2024  -     Lipid Panel; Future; Expected date: 05/08/2024  -     TSH; Future; Expected date: 05/08/2024  -     CBC Auto Differential; Future; Expected date: 05/08/2024    11. Asymptomatic menopause  -     DXA Bone Density Axial Skeleton 1 or more sites; Future; Expected date: 05/08/2024        Anti reflux measures.  Continue with statin therapy.         Follow up in about 6 months (around 11/8/2024), or if symptoms worsen or fail to improve.

## 2024-05-28 DIAGNOSIS — Z00.00 ENCOUNTER FOR MEDICARE ANNUAL WELLNESS EXAM: ICD-10-CM

## 2024-06-24 ENCOUNTER — HOSPITAL ENCOUNTER (OUTPATIENT)
Dept: RADIOLOGY | Facility: HOSPITAL | Age: 69
Discharge: HOME | End: 2024-06-24
Attending: FAMILY MEDICINE
Payer: MEDICARE

## 2024-06-24 DIAGNOSIS — Z78.0 ASYMPTOMATIC MENOPAUSE: ICD-10-CM

## 2024-06-24 PROCEDURE — 77080 DXA BONE DENSITY AXIAL: CPT | Mod: 26,,, | Performed by: RADIOLOGY

## 2024-06-24 PROCEDURE — 77080 DXA BONE DENSITY AXIAL: CPT | Mod: TC

## 2024-10-03 DIAGNOSIS — F41.9 ANXIETY: ICD-10-CM

## 2024-10-03 DIAGNOSIS — F33.1 MODERATE EPISODE OF RECURRENT MAJOR DEPRESSIVE DISORDER: ICD-10-CM

## 2024-10-03 RX ORDER — VENLAFAXINE HYDROCHLORIDE 150 MG/1
150 CAPSULE, EXTENDED RELEASE ORAL
Qty: 90 CAPSULE | Refills: 2 | Status: SHIPPED | OUTPATIENT
Start: 2024-10-03

## 2024-10-04 NOTE — TELEPHONE ENCOUNTER
No care due was identified.  Health [facility] Embedded Care Due Messages. Reference number: [id number].   10/03/2024 11:08:44 PM CDT

## 2024-10-04 NOTE — TELEPHONE ENCOUNTER
Refill Decision Note   Leticia Brown  is requesting a refill authorization.  Brief Assessment and Rationale for Refill:  Approve     Medication Therapy Plan:         Comments:     Note composed:11:39 PM 10/03/2024

## 2024-10-07 DIAGNOSIS — N95.1 MENOPAUSE SYNDROME: ICD-10-CM

## 2024-10-07 DIAGNOSIS — E78.5 DYSLIPIDEMIA: ICD-10-CM

## 2024-10-07 RX ORDER — ATORVASTATIN CALCIUM 10 MG/1
10 TABLET, FILM COATED ORAL NIGHTLY
Qty: 90 TABLET | Refills: 2 | Status: SHIPPED | OUTPATIENT
Start: 2024-10-07

## 2024-10-07 NOTE — TELEPHONE ENCOUNTER
No care due was identified.  Health [facility] Embedded Care Due Messages. Reference number: [id number].   10/07/2024 10:08:33 AM CDT

## 2024-10-08 NOTE — TELEPHONE ENCOUNTER
Refill Routing Note   Medication(s) are not appropriate for processing by [facility] for the following reason(s):        Required labs outdated    [facility] action(s):  Approve  Defer        Medication Therapy Plan: Mammography is not up-to-date per Health Maintenance      Appointments  past 12m or future 3m with PCP    Date Provider   Last Visit   5/8/2024 Timo Pike MD   Next Visit   11/11/2024 Timo Pike MD   ED visits in past 90 days: 0        Note composed:9:02 PM 10/07/2024

## 2024-10-09 DIAGNOSIS — N95.1 MENOPAUSE SYNDROME: ICD-10-CM

## 2024-10-09 NOTE — TELEPHONE ENCOUNTER
No care due was identified.  [facility] Embedded Care Due Messages. Reference number: [id number].   10/09/2024 8:29:57 AM CDT

## 2024-10-09 NOTE — TELEPHONE ENCOUNTER
Refill Routing Note   Medication(s) are not appropriate for processing by [facility] for the following reason(s):        Required labs outdated:      Mammography is up-to-date per Health Maintenance          [facility] action(s):  Defer               Appointments  past 12m or future 3m with PCP    Date Provider   Last Visit   5/8/2024 Timo Pike MD   Next Visit   11/11/2024 Timo Pike MD   ED visits in past 90 days: 0        Note composed:5:28 PM 10/09/2024

## 2024-10-10 RX ORDER — ESTRADIOL 1 MG/1
1 TABLET ORAL
Qty: 90 TABLET | Refills: 3 | Status: SHIPPED | OUTPATIENT
Start: 2024-10-10

## 2024-10-10 RX ORDER — ESTRADIOL 1 MG/1
1 TABLET ORAL DAILY
Qty: 90 TABLET | Refills: 3 | Status: SHIPPED | OUTPATIENT
Start: 2024-10-10 | End: 2024-10-10

## 2024-10-16 ENCOUNTER — HOSPITAL ENCOUNTER (OUTPATIENT)
Dept: RADIOLOGY | Facility: HOSPITAL | Age: 69
Discharge: HOME | End: 2024-10-16
Attending: FAMILY MEDICINE
Payer: MEDICARE

## 2024-10-16 DIAGNOSIS — Z12.31 ENCOUNTER FOR SCREENING MAMMOGRAM FOR BREAST CANCER: ICD-10-CM

## 2024-10-16 PROCEDURE — 77067 SCR MAMMO BI INCL CAD: CPT | Mod: TC

## 2024-11-11 ENCOUNTER — OFFICE VISIT (OUTPATIENT)
Dept: FAMILY MEDICINE | Facility: CLINIC | Age: 69
End: 2024-11-11
Payer: MEDICARE

## 2024-11-11 VITALS
OXYGEN SATURATION: 99 % | HEART RATE: 77 BPM | HEIGHT: 64 IN | SYSTOLIC BLOOD PRESSURE: 110 MMHG | BODY MASS INDEX: 27.67 KG/M2 | DIASTOLIC BLOOD PRESSURE: 70 MMHG | WEIGHT: 162.06 LBS

## 2024-11-11 DIAGNOSIS — Z23 NEEDS FLU SHOT: Primary | ICD-10-CM

## 2024-11-11 DIAGNOSIS — M25.511 ACUTE PAIN OF RIGHT SHOULDER: ICD-10-CM

## 2024-11-11 DIAGNOSIS — M25.572 ACUTE LEFT ANKLE PAIN: ICD-10-CM

## 2024-11-11 DIAGNOSIS — E78.5 HYPERLIPIDEMIA, UNSPECIFIED HYPERLIPIDEMIA TYPE: ICD-10-CM

## 2024-11-11 PROCEDURE — 99215 OFFICE O/P EST HI 40 MIN: CPT | Mod: S$PBB,,, | Performed by: FAMILY MEDICINE

## 2024-11-11 PROCEDURE — 90653 IIV ADJUVANT VACCINE IM: CPT | Mod: PBBFAC,PO

## 2024-11-11 PROCEDURE — 99999 PR PBB SHADOW E&M-EST. PATIENT-LVL IV: CPT | Mod: PBBFAC,,, | Performed by: FAMILY MEDICINE

## 2024-11-11 PROCEDURE — 99214 OFFICE O/P EST MOD 30 MIN: CPT | Mod: PBBFAC,PO | Performed by: FAMILY MEDICINE

## 2024-11-11 PROCEDURE — 99999PBSHW PR PBB SHADOW TECHNICAL ONLY FILED TO HB: Mod: PBBFAC,,,

## 2024-11-11 PROCEDURE — G0008 ADMIN INFLUENZA VIRUS VAC: HCPCS | Mod: PBBFAC,PO

## 2024-11-11 RX ADMIN — INFLUENZA A VIRUS A/VICTORIA/4897/2022 IVR-238 (H1N1) ANTIGEN (FORMALDEHYDE INACTIVATED), INFLUENZA A VIRUS A/THAILAND/8/2022 IVR-237 (H3N2) ANTIGEN (FORMALDEHYDE INACTIVATED), INFLUENZA B VIRUS B/AUSTRIA/1359417/2021 BVR-26 ANTIGEN (FORMALDEHYDE INACTIVATED) 0.5 ML: 15; 15; 15 INJECTION, SUSPENSION INTRAMUSCULAR at 03:11

## 2024-11-12 ENCOUNTER — HOSPITAL ENCOUNTER (OUTPATIENT)
Dept: RADIOLOGY | Facility: HOSPITAL | Age: 69
Discharge: HOME | End: 2024-11-12
Attending: FAMILY MEDICINE
Payer: MEDICARE

## 2024-11-12 DIAGNOSIS — M25.572 ACUTE LEFT ANKLE PAIN: ICD-10-CM

## 2024-11-12 DIAGNOSIS — M25.511 ACUTE PAIN OF RIGHT SHOULDER: ICD-10-CM

## 2024-11-12 PROCEDURE — 73030 X-RAY EXAM OF SHOULDER: CPT | Mod: 26,RT,, | Performed by: INTERNAL MEDICINE

## 2024-11-12 PROCEDURE — 73610 X-RAY EXAM OF ANKLE: CPT | Mod: TC,FY,LT

## 2024-11-12 PROCEDURE — 73030 X-RAY EXAM OF SHOULDER: CPT | Mod: TC,FY,RT

## 2024-11-12 PROCEDURE — 73610 X-RAY EXAM OF ANKLE: CPT | Mod: 26,LT,, | Performed by: INTERNAL MEDICINE

## 2024-11-12 NOTE — PROGRESS NOTES
Subjective     Patient ID: Leticia Brown is a 69 y.o. female.    Chief Complaint: No chief complaint on file.    69 years old female came to the clinic after recent fall associated with right shoulder and left ankle pain.  No recent cholesterol check.    Shoulder Pain   The pain is present in the right shoulder and left ankle. This is a chronic problem. The current episode started more than 1 month ago. There has been a history of trauma. The problem has been unchanged. The quality of the pain is described as aching. The pain is at a severity of 5/10. The pain is moderate. The symptoms are aggravated by activity. She has tried nothing for the symptoms. The treatment provided no relief.   Hyperlipidemia  This is a chronic problem. The problem is controlled. Recent lipid tests were reviewed and are normal. She has no history of obesity. There are no known factors aggravating her hyperlipidemia. Pertinent negatives include no chest pain or myalgias. Current antihyperlipidemic treatment includes statins. The current treatment provides significant improvement of lipids. There are no compliance problems.  Risk factors for coronary artery disease include post-menopausal.     Review of Systems   Constitutional: Negative.    HENT: Negative.     Eyes: Negative.    Respiratory: Negative.     Cardiovascular:  Negative for chest pain, palpitations, leg swelling and claudication.   Gastrointestinal: Negative.    Genitourinary: Negative.    Musculoskeletal:  Positive for arthralgias. Negative for myalgias.   Neurological: Negative.    Psychiatric/Behavioral: Negative.            Objective     Physical Exam  Constitutional:       General: She is not in acute distress.     Appearance: She is well-developed. She is not diaphoretic.   HENT:      Head: Normocephalic and atraumatic.      Right Ear: External ear normal.      Left Ear: External ear normal.      Nose: Nose normal.      Mouth/Throat:      Pharynx: No oropharyngeal exudate.    Eyes:      General: No scleral icterus.        Right eye: No discharge.         Left eye: No discharge.      Conjunctiva/sclera: Conjunctivae normal.      Pupils: Pupils are equal, round, and reactive to light.   Neck:      Thyroid: No thyromegaly.      Vascular: No JVD.      Trachea: No tracheal deviation.   Cardiovascular:      Rate and Rhythm: Normal rate and regular rhythm.      Heart sounds: Normal heart sounds. No murmur heard.     No friction rub. No gallop.   Pulmonary:      Effort: Pulmonary effort is normal. No respiratory distress.      Breath sounds: Normal breath sounds. No stridor. No wheezing or rales.   Chest:      Chest wall: No tenderness.   Abdominal:      General: Bowel sounds are normal. There is no distension.      Palpations: Abdomen is soft. There is no mass.      Tenderness: There is no abdominal tenderness. There is no guarding or rebound.   Musculoskeletal:         General: Normal range of motion.      Right shoulder: Tenderness present.      Cervical back: Normal range of motion and neck supple.      Left ankle: Tenderness present over the lateral malleolus.   Lymphadenopathy:      Cervical: No cervical adenopathy.   Skin:     General: Skin is warm and dry.      Coloration: Skin is not pale.      Findings: No erythema or rash.   Neurological:      Mental Status: She is alert and oriented to person, place, and time.      Cranial Nerves: No cranial nerve deficit.      Motor: No abnormal muscle tone.      Coordination: Coordination normal.      Deep Tendon Reflexes: Reflexes are normal and symmetric.   Psychiatric:         Behavior: Behavior normal.         Thought Content: Thought content normal.         Judgment: Judgment normal.            Assessment and Plan     1. Needs flu shot  -     influenza (adjuvanted) (Fluad) 45 mcg/0.5 mL IM vaccine (> or = 64 yo) 0.5 mL    2. Acute pain of right shoulder  -     X-ray Shoulder 2 or More Views Right; Future; Expected date: 11/11/2024    3.  Acute left ankle pain  -     X-Ray Ankle 2 View Left; Future; Expected date: 11/11/2024    4. Hyperlipidemia, unspecified hyperlipidemia type  -     Urinalysis; Future  -     Comprehensive Metabolic Panel; Future; Expected date: 11/11/2024  -     Lipid Panel; Future; Expected date: 11/11/2024  -     TSH; Future; Expected date: 11/11/2024  -     CBC Auto Differential; Future; Expected date: 11/11/2024        I spent a total of 41 minutes on the day of the visit.This includes face to face time and non-face to face time preparing to see the patient (eg, review of tests), obtaining and/or reviewing separately obtained history, documenting clinical information in the electronic or other health record, independently interpreting results and communicating results to the patient/family/caregiver, or care coordinator.          Follow up if symptoms worsen or fail to improve.

## 2025-01-07 ENCOUNTER — OFFICE VISIT (OUTPATIENT)
Dept: URGENT CARE | Facility: CLINIC | Age: 70
End: 2025-01-07
Payer: MEDICARE

## 2025-01-07 VITALS
DIASTOLIC BLOOD PRESSURE: 74 MMHG | BODY MASS INDEX: 27.66 KG/M2 | HEART RATE: 88 BPM | OXYGEN SATURATION: 98 % | SYSTOLIC BLOOD PRESSURE: 124 MMHG | TEMPERATURE: 98 F | RESPIRATION RATE: 20 BRPM | HEIGHT: 64 IN | WEIGHT: 162 LBS

## 2025-01-07 DIAGNOSIS — J01.90 ACUTE NON-RECURRENT SINUSITIS, UNSPECIFIED LOCATION: Primary | ICD-10-CM

## 2025-01-07 DIAGNOSIS — R05.9 COUGH, UNSPECIFIED TYPE: ICD-10-CM

## 2025-01-07 PROCEDURE — 99213 OFFICE O/P EST LOW 20 MIN: CPT | Mod: 25,S$GLB,, | Performed by: FAMILY MEDICINE

## 2025-01-07 PROCEDURE — 96372 THER/PROPH/DIAG INJ SC/IM: CPT | Mod: S$GLB,,, | Performed by: FAMILY MEDICINE

## 2025-01-07 RX ORDER — DEXAMETHASONE SODIUM PHOSPHATE 10 MG/ML
10 INJECTION INTRAMUSCULAR; INTRAVENOUS
Status: COMPLETED | OUTPATIENT
Start: 2025-01-07 | End: 2025-01-07

## 2025-01-07 RX ORDER — DEXAMETHASONE 4 MG/1
8 TABLET ORAL DAILY
Qty: 6 TABLET | Refills: 0 | Status: SHIPPED | OUTPATIENT
Start: 2025-01-07 | End: 2025-01-10

## 2025-01-07 RX ADMIN — DEXAMETHASONE SODIUM PHOSPHATE 10 MG: 10 INJECTION INTRAMUSCULAR; INTRAVENOUS at 07:01

## 2025-01-08 NOTE — PROGRESS NOTES
"Subjective:      Patient ID: Leticia Brown is a 69 y.o. female.    Vitals:  height is 5' 4" (1.626 m) and weight is 73.5 kg (162 lb). Her oral temperature is 98 °F (36.7 °C). Her blood pressure is 124/74 and her pulse is 88. Her respiration is 20 and oxygen saturation is 98%.     Chief Complaint: Sinus Problem    Pt states last few days having sinus pressure, left ear pain, facial tenderness, cough and congestion, she took some benadryl with no relief.  Denies fever , declined testing, requesting meds for her sinus, ( she is a  and has a big case to present)    Sinus Problem  This is a new problem. The current episode started in the past 7 days. The problem has been gradually worsening since onset. There has been no fever. Associated symptoms include congestion, sinus pressure and sneezing. Past treatments include oral decongestants.       HENT:  Positive for congestion and sinus pressure.    Allergic/Immunologic: Positive for sneezing.      Objective:     Physical Exam   Constitutional: She is oriented to person, place, and time. She appears well-developed. She is cooperative.  Non-toxic appearance. She does not appear ill. No distress.   HENT:   Head: Normocephalic and atraumatic.   Ears:   Right Ear: Hearing, tympanic membrane, external ear and ear canal normal.   Left Ear: Hearing, tympanic membrane, external ear and ear canal normal.   Nose: Nose normal. No mucosal edema, rhinorrhea or nasal deformity. No epistaxis. Right sinus exhibits no maxillary sinus tenderness and no frontal sinus tenderness. Left sinus exhibits no maxillary sinus tenderness and no frontal sinus tenderness.   Mouth/Throat: Uvula is midline, oropharynx is clear and moist and mucous membranes are normal. No trismus in the jaw. Normal dentition. No uvula swelling. No oropharyngeal exudate, posterior oropharyngeal edema or posterior oropharyngeal erythema.   Eyes: Conjunctivae and lids are normal. No scleral icterus.   Neck: Trachea " normal and phonation normal. Neck supple. No edema present. No erythema present. No neck rigidity present.   Cardiovascular: Normal rate, regular rhythm, normal heart sounds and normal pulses.   Pulmonary/Chest: Effort normal and breath sounds normal. No respiratory distress. She has no decreased breath sounds. She has no rhonchi.   Abdominal: Normal appearance.   Musculoskeletal: Normal range of motion.         General: No deformity. Normal range of motion.   Neurological: She is alert and oriented to person, place, and time. She exhibits normal muscle tone. Coordination normal.   Skin: Skin is warm, dry, intact, not diaphoretic and not pale.   Psychiatric: Her speech is normal and behavior is normal. Judgment and thought content normal.   Nursing note and vitals reviewed.      Assessment:     1. Acute non-recurrent sinusitis, unspecified location    2. Cough, unspecified type        Plan:       Acute non-recurrent sinusitis, unspecified location  -     dexAMETHasone injection 10 mg  -     dexAMETHasone (DECADRON) 4 MG Tab; Take 2 tablets (8 mg total) by mouth once daily. for 3 doses  Dispense: 6 tablet; Refill: 0    Cough, unspecified type  -     Cancel: SARS Coronavirus 2 Antigen, POCT Manual Read  -     Cancel: POCT Influenza A/B MOLECULAR        Thank you for choosing [facility]!     Our goal in the Urgent Care is to always provide outstanding medical care. You may receive a survey by mail or e-mail in the next week regarding your experience today. We would greatly appreciate you completing and returning the survey. Your feedback provides us with a way to recognize our staff who provide very good care, and it helps us learn how to improve when your experience was below our aspiration of excellence.       We appreciate you trusting us with your medical care. We hope you feel better soon. We will be happy to take care of you for all of your future medical needs.  You must understand that you've received  an Urgent Care treatment only and that you may be released before all your medical problems are known or treated. You, the patient, will arrange for follow up care as instructed.  Follow up with your PCP or specialty clinic as directed in the next 1-2 weeks if not improved or as needed.  You can call [phone] to schedule an appointment with the appropriate provider.  Another option is to follow up with [facility] ([email or web address]) virtually for quick simple medical advice.  If your condition worsens we recommend that you receive another evaluation at the emergency room immediately or contact your primary medical clinics after hours call service to discuss your concerns.  Please return here or go to the Emergency Department for any concerns or worsening of condition.      *If you were prescribed a narcotic or controlled medication, do not drive or operate heavy equipment or machinery while taking these medications.

## 2025-01-09 RX ORDER — ACETAZOLAMIDE 125 MG/1
125 TABLET ORAL DAILY
Qty: 30 TABLET | Refills: 0 | Status: SHIPPED | OUTPATIENT
Start: 2025-01-09

## 2025-02-22 DIAGNOSIS — Z00.00 ENCOUNTER FOR MEDICARE ANNUAL WELLNESS EXAM: ICD-10-CM

## 2025-04-11 DIAGNOSIS — F33.1 MODERATE EPISODE OF RECURRENT MAJOR DEPRESSIVE DISORDER: ICD-10-CM

## 2025-04-11 DIAGNOSIS — F41.9 ANXIETY: ICD-10-CM

## 2025-04-11 RX ORDER — VENLAFAXINE HYDROCHLORIDE 150 MG/1
150 CAPSULE, EXTENDED RELEASE ORAL
Qty: 90 CAPSULE | Refills: 1 | Status: SHIPPED | OUTPATIENT
Start: 2025-04-11

## 2025-04-11 NOTE — TELEPHONE ENCOUNTER
No care due was identified.  [facility] Embedded Care Due Messages. Reference number: [id number].   4/11/2025 8:07:38 AM CDT

## 2025-04-11 NOTE — TELEPHONE ENCOUNTER
Leticia Brown  is requesting a refill authorization.  Brief Assessment and Rationale for Refill:  Approve     Medication Therapy Plan:         Comments:     Note composed:8:51 AM 04/11/2025

## 2025-05-15 ENCOUNTER — OFFICE VISIT (OUTPATIENT)
Dept: FAMILY MEDICINE | Facility: CLINIC | Age: 70
End: 2025-05-15
Payer: MEDICARE

## 2025-05-15 ENCOUNTER — LAB VISIT (OUTPATIENT)
Dept: LAB | Facility: HOSPITAL | Age: 70
End: 2025-05-15
Attending: FAMILY MEDICINE
Payer: MEDICARE

## 2025-05-15 VITALS
SYSTOLIC BLOOD PRESSURE: 90 MMHG | WEIGHT: 168 LBS | DIASTOLIC BLOOD PRESSURE: 74 MMHG | HEART RATE: 78 BPM | HEIGHT: 64 IN | OXYGEN SATURATION: 95 % | BODY MASS INDEX: 28.68 KG/M2

## 2025-05-15 DIAGNOSIS — D53.9 NUTRITIONAL ANEMIA: ICD-10-CM

## 2025-05-15 DIAGNOSIS — M35.00 SJOGREN SYNDROME, UNSPECIFIED: ICD-10-CM

## 2025-05-15 DIAGNOSIS — E78.5 DYSLIPIDEMIA: ICD-10-CM

## 2025-05-15 DIAGNOSIS — F41.9 ANXIETY: ICD-10-CM

## 2025-05-15 DIAGNOSIS — N95.1 MENOPAUSE SYNDROME: ICD-10-CM

## 2025-05-15 DIAGNOSIS — F33.1 MODERATE EPISODE OF RECURRENT MAJOR DEPRESSIVE DISORDER: ICD-10-CM

## 2025-05-15 DIAGNOSIS — J84.10 PULMONARY FIBROSIS, UNSPECIFIED: ICD-10-CM

## 2025-05-15 DIAGNOSIS — I77.9 BILATERAL CAROTID ARTERY DISEASE, UNSPECIFIED TYPE: ICD-10-CM

## 2025-05-15 DIAGNOSIS — F33.0 MILD EPISODE OF RECURRENT MAJOR DEPRESSIVE DISORDER: ICD-10-CM

## 2025-05-15 DIAGNOSIS — F33.0 MILD EPISODE OF RECURRENT MAJOR DEPRESSIVE DISORDER: Primary | ICD-10-CM

## 2025-05-15 DIAGNOSIS — G95.0 SYRINGOMYELIA: ICD-10-CM

## 2025-05-15 PROBLEM — I20.89 OTHER FORMS OF ANGINA PECTORIS: Status: RESOLVED | Noted: 2023-03-22 | Resolved: 2025-05-15

## 2025-05-15 LAB
ABSOLUTE EOSINOPHIL (OHS): 0.1 K/UL
ABSOLUTE MONOCYTE (OHS): 0.63 K/UL (ref 0.3–1)
ABSOLUTE NEUTROPHIL COUNT (OHS): 3.36 K/UL (ref 1.8–7.7)
ALBUMIN SERPL BCP-MCNC: 3.6 G/DL (ref 3.5–5.2)
ALP SERPL-CCNC: 67 UNIT/L (ref 40–150)
ALT SERPL W/O P-5'-P-CCNC: 20 UNIT/L (ref 10–44)
ANION GAP (OHS): 9 MMOL/L (ref 8–16)
AST SERPL-CCNC: 22 UNIT/L (ref 11–45)
BASOPHILS # BLD AUTO: 0.05 K/UL
BASOPHILS NFR BLD AUTO: 0.9 %
BILIRUB SERPL-MCNC: 0.2 MG/DL (ref 0.1–1)
BILIRUB UR QL STRIP.AUTO: NEGATIVE
BUN SERPL-MCNC: 15 MG/DL (ref 8–23)
CALCIUM SERPL-MCNC: 9 MG/DL (ref 8.7–10.5)
CHLORIDE SERPL-SCNC: 107 MMOL/L (ref 95–110)
CHOLEST SERPL-MCNC: 154 MG/DL (ref 120–199)
CHOLEST/HDLC SERPL: 2.1 {RATIO} (ref 2–5)
CLARITY UR: ABNORMAL
CO2 SERPL-SCNC: 26 MMOL/L (ref 23–29)
COLOR UR AUTO: YELLOW
CREAT SERPL-MCNC: 0.7 MG/DL (ref 0.5–1.4)
ERYTHROCYTE [DISTWIDTH] IN BLOOD BY AUTOMATED COUNT: 12.7 % (ref 11.5–14.5)
GFR SERPLBLD CREATININE-BSD FMLA CKD-EPI: >60 ML/MIN/1.73/M2
GLUCOSE SERPL-MCNC: 77 MG/DL (ref 70–110)
GLUCOSE UR QL STRIP: NEGATIVE
HCT VFR BLD AUTO: 43.6 % (ref 37–48.5)
HDLC SERPL-MCNC: 75 MG/DL (ref 40–75)
HDLC SERPL: 48.7 % (ref 20–50)
HGB BLD-MCNC: 13.5 GM/DL (ref 12–16)
HGB UR QL STRIP: NEGATIVE
IMM GRANULOCYTES # BLD AUTO: 0.01 K/UL (ref 0–0.04)
IMM GRANULOCYTES NFR BLD AUTO: 0.2 % (ref 0–0.5)
KETONES UR QL STRIP: NEGATIVE
LDLC SERPL CALC-MCNC: 65.4 MG/DL (ref 63–159)
LEUKOCYTE ESTERASE UR QL STRIP: NEGATIVE
LYMPHOCYTES # BLD AUTO: 1.36 K/UL (ref 1–4.8)
MCH RBC QN AUTO: 29.5 PG (ref 27–31)
MCHC RBC AUTO-ENTMCNC: 31 G/DL (ref 32–36)
MCV RBC AUTO: 95 FL (ref 82–98)
NITRITE UR QL STRIP: NEGATIVE
NONHDLC SERPL-MCNC: 79 MG/DL
NUCLEATED RBC (/100WBC) (OHS): 0 /100 WBC
PH UR STRIP: 6 [PH]
PLATELET # BLD AUTO: 250 K/UL (ref 150–450)
PMV BLD AUTO: 11.9 FL (ref 9.2–12.9)
POTASSIUM SERPL-SCNC: 4.1 MMOL/L (ref 3.5–5.1)
PROT SERPL-MCNC: 6.7 GM/DL (ref 6–8.4)
PROT UR QL STRIP: ABNORMAL
RBC # BLD AUTO: 4.58 M/UL (ref 4–5.4)
RELATIVE EOSINOPHIL (OHS): 1.8 %
RELATIVE LYMPHOCYTE (OHS): 24.7 % (ref 18–48)
RELATIVE MONOCYTE (OHS): 11.4 % (ref 4–15)
RELATIVE NEUTROPHIL (OHS): 61 % (ref 38–73)
SODIUM SERPL-SCNC: 142 MMOL/L (ref 136–145)
SP GR UR STRIP: 1.03
TRIGL SERPL-MCNC: 68 MG/DL (ref 30–150)
TSH SERPL-ACNC: 1.65 UIU/ML (ref 0.4–4)
UROBILINOGEN UR STRIP-ACNC: NEGATIVE EU/DL
WBC # BLD AUTO: 5.51 K/UL (ref 3.9–12.7)

## 2025-05-15 PROCEDURE — 99215 OFFICE O/P EST HI 40 MIN: CPT | Mod: S$PBB,,, | Performed by: FAMILY MEDICINE

## 2025-05-15 PROCEDURE — 85025 COMPLETE CBC W/AUTO DIFF WBC: CPT

## 2025-05-15 PROCEDURE — 84075 ASSAY ALKALINE PHOSPHATASE: CPT

## 2025-05-15 PROCEDURE — 36415 COLL VENOUS BLD VENIPUNCTURE: CPT | Mod: PO

## 2025-05-15 PROCEDURE — 99999 PR PBB SHADOW E&M-EST. PATIENT-LVL III: CPT | Mod: PBBFAC,,, | Performed by: FAMILY MEDICINE

## 2025-05-15 PROCEDURE — 81003 URINALYSIS AUTO W/O SCOPE: CPT

## 2025-05-15 PROCEDURE — 84443 ASSAY THYROID STIM HORMONE: CPT

## 2025-05-15 PROCEDURE — 80061 LIPID PANEL: CPT

## 2025-05-15 PROCEDURE — 99213 OFFICE O/P EST LOW 20 MIN: CPT | Mod: PBBFAC,PO | Performed by: FAMILY MEDICINE

## 2025-05-15 RX ORDER — FERROUS SULFATE 325(65) MG
325 TABLET ORAL DAILY
Qty: 90 TABLET | Refills: 3 | Status: SHIPPED | OUTPATIENT
Start: 2025-05-15

## 2025-05-15 RX ORDER — ATORVASTATIN CALCIUM 10 MG/1
10 TABLET, FILM COATED ORAL NIGHTLY
Qty: 90 TABLET | Refills: 2 | Status: SHIPPED | OUTPATIENT
Start: 2025-05-15

## 2025-05-15 RX ORDER — ESTRADIOL 1 MG/1
1 TABLET ORAL DAILY
Qty: 90 TABLET | Refills: 3 | Status: SHIPPED | OUTPATIENT
Start: 2025-05-15

## 2025-05-15 RX ORDER — VENLAFAXINE HYDROCHLORIDE 150 MG/1
150 CAPSULE, EXTENDED RELEASE ORAL DAILY
Qty: 90 CAPSULE | Refills: 3 | Status: SHIPPED | OUTPATIENT
Start: 2025-05-15

## 2025-05-15 NOTE — PROGRESS NOTES
Subjective     Patient ID: Leticia Brown is a 69 y.o. female.    Chief Complaint: Hyperlipidemia and Depression    69 years old female came to the clinic for depression check.  Patient with good compliance with Effexor.  No anxiety right now.  She prefers to continue with estradiol  for menopausal symptoms.  Patient was oriented about possible side effects of the medicine.  She was previously diagnosed with carotic artery disease.  Patient currently on statin therapy.  Patient was previously diagnosed with anemia.  Testing for anemia was ordered.    Hyperlipidemia  This is a chronic problem. The problem is controlled. Recent lipid tests were reviewed and are normal. She has no history of obesity. Pertinent negatives include no chest pain or myalgias. Current antihyperlipidemic treatment includes statins. The current treatment provides significant improvement of lipids. Risk factors for coronary artery disease include post-menopausal and dyslipidemia.   Depression  Visit Type: follow-up  Patient is not experiencing: decreased concentration, depressed mood, suicidal ideas and suicidal planning.  Frequency of symptoms: rarely   Severity: mild   Sleep quality: fair      Review of Systems   Constitutional: Negative.    HENT: Negative.     Eyes: Negative.    Respiratory: Negative.     Cardiovascular:  Negative for chest pain.   Gastrointestinal: Negative.    Genitourinary: Negative.    Musculoskeletal: Negative.  Negative for myalgias.   Neurological: Negative.    Psychiatric/Behavioral:  Positive for depression. Negative for decreased concentration, depressed mood and suicidal ideas.           Objective     Physical Exam  Constitutional:       General: She is not in acute distress.     Appearance: She is well-developed. She is not diaphoretic.   HENT:      Head: Normocephalic and atraumatic.      Right Ear: External ear normal.      Left Ear: External ear normal.      Nose: Nose normal.      Mouth/Throat:      Pharynx:  No oropharyngeal exudate.   Eyes:      General: No scleral icterus.        Right eye: No discharge.         Left eye: No discharge.      Conjunctiva/sclera: Conjunctivae normal.      Pupils: Pupils are equal, round, and reactive to light.   Neck:      Thyroid: No thyromegaly.      Vascular: No JVD.      Trachea: No tracheal deviation.   Cardiovascular:      Rate and Rhythm: Normal rate and regular rhythm.      Heart sounds: Normal heart sounds. No murmur heard.     No friction rub. No gallop.   Pulmonary:      Effort: Pulmonary effort is normal. No respiratory distress.      Breath sounds: Normal breath sounds. No stridor. No wheezing or rales.   Chest:      Chest wall: No tenderness.   Abdominal:      General: Bowel sounds are normal. There is no distension.      Palpations: Abdomen is soft. There is no mass.      Tenderness: There is no abdominal tenderness. There is no guarding or rebound.   Musculoskeletal:         General: No tenderness. Normal range of motion.      Cervical back: Normal range of motion and neck supple.   Lymphadenopathy:      Cervical: No cervical adenopathy.   Skin:     General: Skin is warm and dry.      Coloration: Skin is not pale.      Findings: No erythema or rash.   Neurological:      Mental Status: She is alert and oriented to person, place, and time.      Cranial Nerves: No cranial nerve deficit.      Motor: No abnormal muscle tone.      Coordination: Coordination normal.      Deep Tendon Reflexes: Reflexes are normal and symmetric.   Psychiatric:         Behavior: Behavior normal.         Thought Content: Thought content normal.         Judgment: Judgment normal.            Assessment and Plan     1. Mild episode of recurrent major depressive disorder  -     Comprehensive Metabolic Panel; Future; Expected date: 05/15/2025  -     TSH; Future; Expected date: 05/15/2025  -     CBC Auto Differential; Future; Expected date: 05/15/2025    2. Pulmonary fibrosis, unspecified    3.  Syringomyelia    4. Sjogren syndrome, unspecified  -     Comprehensive Metabolic Panel; Future; Expected date: 05/15/2025    5. Bilateral carotid artery disease, unspecified type  Overview:  Stenosis seen on carotid US on 5/26/2015    Orders:  -     Lipid Panel; Future; Expected date: 05/15/2025    6. Anxiety  -     venlafaxine (EFFEXOR-XR) 150 MG Cp24; Take 1 capsule (150 mg total) by mouth once daily.  Dispense: 90 capsule; Refill: 3    7. Moderate episode of recurrent major depressive disorder  -     venlafaxine (EFFEXOR-XR) 150 MG Cp24; Take 1 capsule (150 mg total) by mouth once daily.  Dispense: 90 capsule; Refill: 3  -     Urinalysis; Future  -     Comprehensive Metabolic Panel; Future; Expected date: 05/15/2025  -     TSH; Future; Expected date: 05/15/2025  -     CBC Auto Differential; Future; Expected date: 05/15/2025    8. Dyslipidemia  -     atorvastatin (LIPITOR) 10 MG tablet; Take 1 tablet (10 mg total) by mouth every evening.  Dispense: 90 tablet; Refill: 2  -     Comprehensive Metabolic Panel; Future; Expected date: 05/15/2025  -     Lipid Panel; Future; Expected date: 05/15/2025  -     TSH; Future; Expected date: 05/15/2025  -     Urinalysis; Future  -     Comprehensive Metabolic Panel; Future; Expected date: 05/15/2025  -     Lipid Panel; Future; Expected date: 05/15/2025  -     TSH; Future; Expected date: 05/15/2025    9. Menopause syndrome  -     estradioL (ESTRACE) 1 MG tablet; Take 1 tablet (1 mg total) by mouth once daily.  Dispense: 90 tablet; Refill: 3    10. Nutritional anemia  -     ferrous sulfate (FEOSOL) 325 mg (65 mg iron) Tab tablet; Take 1 tablet (325 mg total) by mouth once daily.  Dispense: 90 tablet; Refill: 3  -     CBC Auto Differential; Future; Expected date: 05/15/2025  -     CBC Auto Differential; Future; Expected date: 05/15/2025        I spent a total of 40 minutes on the day of the visit.This includes face to face time and non-face to face time preparing to see the patient  (eg, review of tests), obtaining and/or reviewing separately obtained history, documenting clinical information in the electronic or other health record, independently interpreting results and communicating results to the patient/family/caregiver, or care coordinator.          Follow up in about 6 months (around 11/15/2025), or if symptoms worsen or fail to improve.

## 2025-07-01 ENCOUNTER — RESULTS FOLLOW-UP (OUTPATIENT)
Dept: FAMILY MEDICINE | Facility: CLINIC | Age: 70
End: 2025-07-01

## 2025-07-07 ENCOUNTER — LAB VISIT (OUTPATIENT)
Dept: LAB | Facility: HOSPITAL | Age: 70
End: 2025-07-07
Attending: NURSE PRACTITIONER
Payer: MEDICARE

## 2025-07-07 ENCOUNTER — OFFICE VISIT (OUTPATIENT)
Dept: OTOLARYNGOLOGY | Facility: CLINIC | Age: 70
End: 2025-07-07
Payer: MEDICARE

## 2025-07-07 ENCOUNTER — OFFICE VISIT (OUTPATIENT)
Dept: FAMILY MEDICINE | Facility: CLINIC | Age: 70
End: 2025-07-07
Payer: MEDICARE

## 2025-07-07 ENCOUNTER — CLINICAL SUPPORT (OUTPATIENT)
Dept: AUDIOLOGY | Facility: CLINIC | Age: 70
End: 2025-07-07
Payer: MEDICARE

## 2025-07-07 ENCOUNTER — OFFICE VISIT (OUTPATIENT)
Dept: OPTOMETRY | Facility: CLINIC | Age: 70
End: 2025-07-07
Payer: MEDICARE

## 2025-07-07 VITALS
SYSTOLIC BLOOD PRESSURE: 94 MMHG | HEIGHT: 64 IN | HEART RATE: 72 BPM | OXYGEN SATURATION: 98 % | DIASTOLIC BLOOD PRESSURE: 64 MMHG | BODY MASS INDEX: 28.04 KG/M2 | WEIGHT: 164.25 LBS

## 2025-07-07 DIAGNOSIS — I77.9 BILATERAL CAROTID ARTERY DISEASE, UNSPECIFIED TYPE: ICD-10-CM

## 2025-07-07 DIAGNOSIS — M35.00 SJOGREN SYNDROME, UNSPECIFIED: ICD-10-CM

## 2025-07-07 DIAGNOSIS — H91.93 BILATERAL HEARING LOSS, UNSPECIFIED HEARING LOSS TYPE: ICD-10-CM

## 2025-07-07 DIAGNOSIS — E66.3 OVERWEIGHT WITH BODY MASS INDEX (BMI) OF 28 TO 28.9 IN ADULT: ICD-10-CM

## 2025-07-07 DIAGNOSIS — H25.13 NUCLEAR SCLEROSIS, BILATERAL: Primary | ICD-10-CM

## 2025-07-07 DIAGNOSIS — F33.0 MILD EPISODE OF RECURRENT MAJOR DEPRESSIVE DISORDER: ICD-10-CM

## 2025-07-07 DIAGNOSIS — H52.13 MYOPIA OF BOTH EYES WITH ASTIGMATISM AND PRESBYOPIA: ICD-10-CM

## 2025-07-07 DIAGNOSIS — E78.5 DYSLIPIDEMIA: ICD-10-CM

## 2025-07-07 DIAGNOSIS — D50.9 IRON DEFICIENCY ANEMIA, UNSPECIFIED IRON DEFICIENCY ANEMIA TYPE: ICD-10-CM

## 2025-07-07 DIAGNOSIS — Z13.1 SCREENING FOR DIABETES MELLITUS (DM): ICD-10-CM

## 2025-07-07 DIAGNOSIS — Z98.890 S/P LASIK SURGERY OF BOTH EYES: ICD-10-CM

## 2025-07-07 DIAGNOSIS — H52.4 MYOPIA OF BOTH EYES WITH ASTIGMATISM AND PRESBYOPIA: ICD-10-CM

## 2025-07-07 DIAGNOSIS — H90.42 SENSORINEURAL HEARING LOSS (SNHL) OF LEFT EAR WITH UNRESTRICTED HEARING OF RIGHT EAR: Primary | ICD-10-CM

## 2025-07-07 DIAGNOSIS — H52.203 MYOPIA OF BOTH EYES WITH ASTIGMATISM AND PRESBYOPIA: ICD-10-CM

## 2025-07-07 DIAGNOSIS — G95.0 SYRINGOMYELIA: ICD-10-CM

## 2025-07-07 DIAGNOSIS — Z01.00 ROUTINE EYE EXAM: ICD-10-CM

## 2025-07-07 DIAGNOSIS — Z00.00 ENCOUNTER FOR MEDICARE ANNUAL WELLNESS EXAM: Primary | ICD-10-CM

## 2025-07-07 LAB
EAG (OHS): 91 MG/DL (ref 68–131)
HBA1C MFR BLD: 4.8 % (ref 4–5.6)

## 2025-07-07 PROCEDURE — G0439 PPPS, SUBSEQ VISIT: HCPCS | Mod: ,,, | Performed by: NURSE PRACTITIONER

## 2025-07-07 PROCEDURE — 99999 PR PBB SHADOW E&M-EST. PATIENT-LVL III: CPT | Mod: PBBFAC,,, | Performed by: OPTOMETRIST

## 2025-07-07 PROCEDURE — 99204 OFFICE O/P NEW MOD 45 MIN: CPT | Mod: S$PBB,,, | Performed by: OPTOMETRIST

## 2025-07-07 PROCEDURE — 99215 OFFICE O/P EST HI 40 MIN: CPT | Mod: PBBFAC,27,PO,25 | Performed by: NURSE PRACTITIONER

## 2025-07-07 PROCEDURE — 99214 OFFICE O/P EST MOD 30 MIN: CPT | Mod: S$PBB,,, | Performed by: OTOLARYNGOLOGY

## 2025-07-07 PROCEDURE — 99999 PR PBB SHADOW E&M-EST. PATIENT-LVL II: CPT | Mod: PBBFAC,,, | Performed by: OTOLARYNGOLOGY

## 2025-07-07 PROCEDURE — 83036 HEMOGLOBIN GLYCOSYLATED A1C: CPT

## 2025-07-07 PROCEDURE — 82728 ASSAY OF FERRITIN: CPT

## 2025-07-07 PROCEDURE — 92567 TYMPANOMETRY: CPT | Mod: PBBFAC

## 2025-07-07 PROCEDURE — 99211 OFF/OP EST MAY X REQ PHY/QHP: CPT | Mod: PBBFAC,27,25

## 2025-07-07 PROCEDURE — 99999 PR PBB SHADOW E&M-EST. PATIENT-LVL V: CPT | Mod: PBBFAC,,, | Performed by: NURSE PRACTITIONER

## 2025-07-07 PROCEDURE — 92557 COMPREHENSIVE HEARING TEST: CPT | Mod: PBBFAC

## 2025-07-07 PROCEDURE — 99212 OFFICE O/P EST SF 10 MIN: CPT | Mod: PBBFAC,27,25 | Performed by: OTOLARYNGOLOGY

## 2025-07-07 PROCEDURE — 99213 OFFICE O/P EST LOW 20 MIN: CPT | Mod: PBBFAC,25 | Performed by: OPTOMETRIST

## 2025-07-07 PROCEDURE — 36415 COLL VENOUS BLD VENIPUNCTURE: CPT | Mod: PO

## 2025-07-07 PROCEDURE — 83540 ASSAY OF IRON: CPT

## 2025-07-07 PROCEDURE — 99999 PR PBB SHADOW E&M-EST. PATIENT-LVL I: CPT | Mod: PBBFAC,,,

## 2025-07-07 NOTE — PROGRESS NOTES
Subjective     Patient ID: Leticia Brown is a 70 y.o. female.    Chief Complaint: Hearing Loss    HPI  Review of Systems       Objective     Physical Exam       Assessment and Plan     1. Bilateral hearing loss, unspecified hearing loss type  -     Ambulatory referral/consult to ENT                 No follow-ups on file.

## 2025-07-07 NOTE — PROGRESS NOTES
HPI    Annual Exam   Pt presents to [facility]   Pt states vision is not as sharp @ distance and near   Pt reports was not happy with last exam @ [facility] 3 yrs ago  Pt reports was unable to adapt to specs due to HA's was told it was due to   astig       HX OF PVD 10 yrs ago and still aware of floaters     Pt reports RK 30 years ago     Pt denies Dry/ Itchy/ Burning  Gtt: No    Last edited by Mandeep Latham, OD on 7/7/2025  2:26 PM.            Assessment /Plan     For exam results, see Encounter Report.    Nuclear sclerosis, bilateral  -Mature referral to [facility] for consult    S/P LASIK surgery of both eyes  -Pt stated in Hx she had RK, most likely LASIK or PRK, no RK incisions     Myopia of both eyes with astigmatism and presbyopia  -     Ambulatory referral/consult to Optometry  -Hold sRx secondary to cataracts       RTC 1 yr

## 2025-07-07 NOTE — PROGRESS NOTES
Subjective     Patient ID: Leticia Brown is a 70 y.o. female.    Chief Complaint: Hearing Loss    HPI: Hx L stapes yrs ago.    Having some inc HL.    No HAE.    Past Medical History: Patient has a past medical history of Anxiety disorder, unspecified, BCC (basal cell carcinoma), Cancer, Hyperlipemia, and Sleep apnea.    Past Surgical History: Patient has a past surgical history that includes Tonsillectomy; Adenoidectomy; Colonoscopy (2005); Tympanoplasty; Bunionectomy; back sx; Nasal septoplasty (N/A, 11/05/2021); Nasal turbinate reduction (Bilateral, 11/05/2021); Neck surgery; Colonoscopy (N/A, 03/25/2022); Creation of urethral sling using polypropylene tape by retropubic approach (N/A, 12/20/2022); and Cystoscopy (N/A, 12/20/2022).    Social History: Patient reports that she has never smoked. She has never used smokeless tobacco. She reports current alcohol use. She reports that she does not use drugs.    Family History: family history includes Breast cancer in her maternal cousin; Diabetes in her brother; No Known Problems in her brother, brother, brother, sister, and sister; Stroke in her mother.    Medications:   Current Outpatient Medications   Medication Sig    acetaZOLAMIDE (DIAMOX) 125 MG Tab Take 1 tablet (125 mg total) by mouth once daily. PRN for high altitude    atorvastatin (LIPITOR) 10 MG tablet Take 1 tablet (10 mg total) by mouth every evening.    estradioL (ESTRACE) 1 MG tablet Take 1 tablet (1 mg total) by mouth once daily.    ferrous sulfate (FEOSOL) 325 mg (65 mg iron) Tab tablet Take 1 tablet (325 mg total) by mouth once daily.    multivitamin (THERAGRAN) per tablet Take 1 tablet by mouth once daily.    venlafaxine (EFFEXOR-XR) 150 MG Cp24 Take 1 capsule (150 mg total) by mouth once daily.     No current facility-administered medications for this visit.       Allergies: Patient is allergic to sulfa (sulfonamide antibiotics).    Review of Systems   Constitutional:  Negative for appetite change,  chills, fatigue and fever.   HENT:  Positive for hearing loss. Negative for nasal congestion, ear discharge, facial swelling, postnasal drip, rhinorrhea, sore throat, tinnitus and trouble swallowing.    Eyes:  Negative for photophobia, pain, discharge, redness, itching and visual disturbance.   Respiratory:  Negative for apnea, cough, choking, chest tightness, shortness of breath, wheezing and stridor.    Cardiovascular:  Negative for chest pain and palpitations.   Gastrointestinal:  Negative for abdominal pain, constipation, diarrhea, nausea and vomiting.   Musculoskeletal:  Negative for arthralgias, gait problem, joint swelling, myalgias, neck pain and neck stiffness.   Integumentary:  Negative for color change, pallor, rash and wound.   Neurological:  Negative for dizziness, tremors, seizures, syncope, facial asymmetry, speech difficulty, weakness, light-headedness, numbness and headaches.   Hematological:  Negative for adenopathy. Does not bruise/bleed easily.   Psychiatric/Behavioral:  Negative for agitation, behavioral problems, confusion, decreased concentration, dysphoric mood, hallucinations, sleep disturbance and suicidal ideas. The patient is not nervous/anxious and is not hyperactive.           Objective     Physical Exam  Vitals and nursing note reviewed.   Constitutional:       General: She is not in acute distress.     Appearance: Normal appearance. She is well-developed. She is not ill-appearing, toxic-appearing or diaphoretic.   HENT:      Head: Normocephalic and atraumatic. Not macrocephalic and not microcephalic. No raccoon eyes, Terrell's sign, abrasion, contusion, right periorbital erythema, left periorbital erythema or laceration. Hair is normal.      Right Ear: Ear canal normal. No decreased hearing noted. No laceration, drainage, swelling or tenderness. No middle ear effusion. No foreign body. No mastoid tenderness. No hemotympanum. Tympanic membrane is not injected, scarred, perforated,  erythematous, retracted or bulging. Tympanic membrane has normal mobility.      Left Ear: Ear canal normal. Decreased hearing noted. No laceration, drainage, swelling or tenderness.  No middle ear effusion. No foreign body. No mastoid tenderness. No hemotympanum. Tympanic membrane is not injected, scarred, perforated, erythematous, retracted or bulging. Tympanic membrane has normal mobility.      Nose: No nasal deformity, septal deviation, laceration, mucosal edema or rhinorrhea.      Right Sinus: No maxillary sinus tenderness.      Left Sinus: No maxillary sinus tenderness.   Eyes:      General: Lids are normal.      Conjunctiva/sclera: Conjunctivae normal.      Pupils: Pupils are equal, round, and reactive to light.   Neck:      Thyroid: No thyroid mass or thyromegaly.      Vascular: No JVD.      Trachea: No tracheal tenderness or tracheal deviation.   Cardiovascular:      Rate and Rhythm: Normal rate and regular rhythm.   Pulmonary:      Effort: Pulmonary effort is normal. No tachypnea, bradypnea, accessory muscle usage or respiratory distress.      Breath sounds: No stridor.   Abdominal:      Palpations: Abdomen is soft.   Musculoskeletal:         General: Normal range of motion.      Cervical back: Normal range of motion and neck supple. No edema, erythema or rigidity. No muscular tenderness. Normal range of motion.   Lymphadenopathy:      Head:      Right side of head: No submental, submandibular, tonsillar, preauricular or posterior auricular adenopathy.      Left side of head: No submental, submandibular, tonsillar, preauricular or posterior auricular adenopathy.      Cervical: No cervical adenopathy.      Right cervical: No superficial, deep or posterior cervical adenopathy.     Left cervical: No superficial, deep or posterior cervical adenopathy.   Skin:     General: Skin is warm and dry.      Coloration: Skin is not pale.      Findings: No abrasion, bruising, burn, ecchymosis, erythema, laceration, lesion  or rash.   Neurological:      Mental Status: She is alert and oriented to person, place, and time.      Cranial Nerves: No cranial nerve deficit.      Sensory: No sensory deficit.      Motor: No tremor, atrophy, abnormal muscle tone or seizure activity.   Psychiatric:         Speech: She is communicative. Speech is not rapid and pressured or slurred.         Behavior: Behavior normal. Behavior is cooperative.         Thought Content: Thought content normal.         Judgment: Judgment normal.            Assessment and Plan     1. Bilateral hearing loss, unspecified hearing loss type  -     Ambulatory referral/consult to ENT        Patient to see Audiology for hearing aid evaluation..    Would not rec revision AS at this point.    F/U prn.         No follow-ups on file.

## 2025-07-07 NOTE — PROGRESS NOTES
Leticia Brown, a 70 y.o. female, was seen today in the clinic for an audiologic evaluation. Ms. Brown reported decreased hearing in the left ear. Patient denied tinnitus, otalgia, aural fullness, and dizziness.    Tympanometry revealed Type A tympanogram in the right ear and Type Ad tympanogram in the left ear. Audiogram results revealed normal hearing sensitivity in the right ear and mild to moderate sensorineural hearing loss in the left ear.  Speech reception thresholds were noted at 15 dBHL in the right ear and 25 dBHL in the left ear.  Speech discrimination scores were 100% in the right ear and 100% in the left ear.    Recommendations:  Otologic evaluation  Annual audiogram or sooner if change is perceived  Consider hearing aid consultation for the left ear pending medical clearance  Hearing protection in noise

## 2025-07-08 PROBLEM — F33.1 MODERATE EPISODE OF RECURRENT MAJOR DEPRESSIVE DISORDER: Status: RESOLVED | Noted: 2024-05-08 | Resolved: 2025-07-08

## 2025-07-08 PROBLEM — E66.3 OVERWEIGHT WITH BODY MASS INDEX (BMI) OF 28 TO 28.9 IN ADULT: Status: ACTIVE | Noted: 2023-05-04

## 2025-07-08 PROBLEM — F33.0 MILD EPISODE OF RECURRENT MAJOR DEPRESSIVE DISORDER: Status: ACTIVE | Noted: 2023-05-04

## 2025-07-08 LAB
FERRITIN SERPL-MCNC: 181 NG/ML (ref 20–300)
IRON SATN MFR SERPL: 25 % (ref 20–50)
IRON SERPL-MCNC: 94 UG/DL (ref 30–160)
TIBC SERPL-MCNC: 371 UG/DL (ref 250–450)
TRANSFERRIN SERPL-MCNC: 251 MG/DL (ref 200–375)

## 2025-07-09 NOTE — PROGRESS NOTES
"  Leticia Brown presented for a  Medicare AWV and comprehensive Health Risk Assessment today. The following components were reviewed and updated:    Medical history  Family History  Social history  Allergies and Current Medications  Health Risk Assessment  Health Maintenance  Care Team         ** See Completed Assessments for Annual Wellness Visit within the encounter summary.**         The following assessments were completed:  Living Situation  CAGE  Depression Screening  Timed Get Up and Go  Whisper Test  Cognitive Function Screening      Nutrition Screening  ADL Screening  PAQ Screening      Opioid documentation:      Patient does not have a current opioid prescription.        Vitals:    07/07/25 1010   BP: 94/64   BP Location: Right arm   Patient Position: Sitting   Pulse: 72   SpO2: 98%   Weight: 74.5 kg (164 lb 3.9 oz)   Height: 5' 4" (1.626 m)     Body mass index is 28.19 kg/m².    Physical Exam  Vitals reviewed.   Constitutional:       General: She is awake. She is not in acute distress.     Appearance: Normal appearance. She is well-developed, well-groomed and overweight.   HENT:      Head: Normocephalic.   Eyes:      General:         Right eye: No discharge.         Left eye: No discharge.   Cardiovascular:      Rate and Rhythm: Normal rate.   Pulmonary:      Effort: Pulmonary effort is normal. No respiratory distress.   Skin:     Coloration: Skin is not pale.   Neurological:      Mental Status: She is alert and oriented to person, place, and time.      Coordination: Coordination normal.   Psychiatric:         Attention and Perception: Attention normal.         Mood and Affect: Mood and affect normal.         Speech: Speech normal.         Behavior: Behavior normal. Behavior is cooperative.         Thought Content: Thought content normal.               Diagnoses and health risks identified today and associated recommendations/orders:    1. Encounter for Medicare annual wellness exam    2. " Syringomyelia  Chronic; stable. As noted on previous imaging. Follow up with PCP.    3. Sjogren syndrome, unspecified  Chronic; stable. Follow up with PCP.    4. Mild episode of recurrent major depressive disorder  Chronic; stable on Effexor medication. Follow up with PCP.    5. Bilateral carotid artery disease, unspecified type  Chronic; stable on atorvastatin medication. Follow up with PCP.    6. Dyslipidemia  Chronic; stable on atorvastatin medication. Follow up with PCP.    7. Iron deficiency anemia, unspecified iron deficiency anemia type  Chronic; stable on iron supplementation. Follow up with PCP.  - Iron and TIBC; Future  - Ferritin; Future    8. Bilateral hearing loss, unspecified hearing loss type  - Ambulatory referral/consult to Audiology; Future  - Ambulatory referral/consult to ENT; Future    9. Routine eye exam  - Ambulatory referral/consult to Optometry; Future    10. Screening for diabetes mellitus (DM)  - Hemoglobin A1C; Future    11. Overweight with body mass index (BMI) of 28 to 28.9 in adult  Eat a heart-healthy, low fat diet and discussed importance of engaging in physical activity at least 5x/week for a minimum of 30 min/day.        Provided Leticia with a 5-10 year written screening schedule and personal prevention plan. Recommendations were developed using the USPSTF age appropriate recommendations. Education, counseling, and referrals were provided as needed. After Visit Summary printed and given to patient which includes a list of additional screenings/tests needed.    Follow up for your next annual wellness visit.    Arianne Greene NP      Advance Care Planning     I offered to discuss advanced care planning, including how to pick a person who would make decisions for you if you were unable to make them for yourself, called a health care power of , and what kind of decisions you might make such as use of life sustaining treatments such as ventilators and tube feeding when faced  with a life limiting illness recorded on a living will that they will need to know. (How you want to be cared for as you near the end of your natural life)     X  Patient is unwilling to engage in a discussion regarding advance directives at this time. Patient is an  and will handle on her own when she is ready.

## 2025-07-09 NOTE — PATIENT INSTRUCTIONS
Counseling and Referral of Other Preventative  (Italic type indicates deductible and co-insurance are waived)    Patient Name: Leticia Brown  Today's Date: 7/8/2025    Health Maintenance       Date Due Completion Date    RSV Vaccine (Age 60+ and Pregnant patients) (1 - Risk 60-74 years 1-dose series) Never done ---    Influenza Vaccine (1) 09/01/2025 11/11/2024    Mammogram 10/16/2025 10/16/2024    TETANUS VACCINE 01/14/2026 1/14/2016    Hemoglobin A1c (Diabetic Prevention Screening) 07/07/2028 7/7/2025    DEXA Scan 06/24/2029 6/24/2024    Lipid Panel 05/15/2030 5/15/2025    Colorectal Cancer Screening 03/25/2032 3/25/2022        Orders Placed This Encounter   Procedures    Iron and TIBC    Ferritin    Hemoglobin A1C    Ambulatory referral/consult to Audiology    Ambulatory referral/consult to ENT    Ambulatory referral/consult to Optometry     The following information is provided to all patients.  This information is to help you find resources for any of the problems found today that may be affecting your health:                  Living healthy guide: www.[facility].[place].gov      Understanding Diabetes: www.diabetes.org      Eating healthy: www.cdc.gov/healthyweight      CDC home safety checklist: www.cdc.gov/steadi/patient.html      Agency on Aging: www.goea.[place].[facility]      Alcoholics anonymous (AA): www.aa.org      Physical Activity: www.luigi.nih.gov/rf7ciiu      Tobacco use: www.quitwithusla.org

## 2025-08-29 ENCOUNTER — OFFICE VISIT (OUTPATIENT)
Dept: OPHTHALMOLOGY | Facility: CLINIC | Age: 70
End: 2025-08-29
Payer: MEDICARE

## 2025-08-29 DIAGNOSIS — H25.13 NUCLEAR AGE-RELATED CATARACT, BOTH EYES: Primary | ICD-10-CM

## 2025-08-29 PROCEDURE — 99999 PR PBB SHADOW E&M-EST. PATIENT-LVL II: CPT | Mod: PBBFAC,,,

## 2025-08-29 PROCEDURE — 99212 OFFICE O/P EST SF 10 MIN: CPT | Mod: PBBFAC

## (undated) DEVICE — TOWEL OR DISP STRL BLUE 4/PK

## (undated) DEVICE — SOL IRR WATER STRL 3000 ML

## (undated) DEVICE — MUPIROCIN CALCIUM TOP CREAM 2%

## (undated) DEVICE — SUT PLAIN 4-0 SC-1 18IN

## (undated) DEVICE — GOWN SURGICAL X-LARGE

## (undated) DEVICE — ELECTRODE REM PLYHSV RETURN 9

## (undated) DEVICE — SYR ONLY LUER LOCK 20CC

## (undated) DEVICE — LUBRICANT SURGILUBE 2 OZ

## (undated) DEVICE — NDL HYPO REG 25G X 1 1/2

## (undated) DEVICE — SUT 3-0 VICRYL SH CR/8 18

## (undated) DEVICE — SOL NS 1000CC

## (undated) DEVICE — TRAY MINOR GEN SURG OMC

## (undated) DEVICE — BLADE INFERIOR TURBINATE 5/PK

## (undated) DEVICE — SET IRR URLGY 2LINE UNIV SPIKE

## (undated) DEVICE — SUT 3-0 VICRYL / RB-1

## (undated) DEVICE — TUBING SUC UNIV W/CONN 12FT

## (undated) DEVICE — SYR 10CC LUER LOCK

## (undated) DEVICE — CLIPPER BLADE MOD 4406 (CAREF)

## (undated) DEVICE — PACK LAPSCP/PELVSCPY III TIBRN

## (undated) DEVICE — DRAPE STERI INSTRUMENT 1018

## (undated) DEVICE — TRAY SKIN SCRUB WET PREMIUM

## (undated) DEVICE — DRESSING SURGICAL 1X3

## (undated) DEVICE — SUT 2-0 VICRYL / SH (J417)

## (undated) DEVICE — SEE MEDLINE ITEM 156913

## (undated) DEVICE — SUT 4-0 CHROMIC GUT / RB1

## (undated) DEVICE — SPONGE DERMACEA GAUZE 4X4

## (undated) DEVICE — BANDAGE ADHESIVE PLAS STRL 1X3

## (undated) DEVICE — BLADE SCALP OPHTL RND TIP

## (undated) DEVICE — CATH IV INTROCAN 14G X 2.

## (undated) DEVICE — PACK PERI/GYN OPTIMA

## (undated) DEVICE — ADHESIVE DERMABOND ADVANCED

## (undated) DEVICE — DRAPE UINDERBUT GRAD PCH

## (undated) DEVICE — SEE MEDLINE ITEM 146322

## (undated) DEVICE — TRAY CATH FOL SIL URIMTR 16FR